# Patient Record
Sex: FEMALE | Race: WHITE | NOT HISPANIC OR LATINO | Employment: UNEMPLOYED | URBAN - METROPOLITAN AREA
[De-identification: names, ages, dates, MRNs, and addresses within clinical notes are randomized per-mention and may not be internally consistent; named-entity substitution may affect disease eponyms.]

---

## 2017-03-27 ENCOUNTER — OFFICE VISIT (OUTPATIENT)
Dept: URGENT CARE | Facility: URGENT CARE | Age: 37
End: 2017-03-27
Payer: COMMERCIAL

## 2017-03-27 VITALS
BODY MASS INDEX: 53.03 KG/M2 | TEMPERATURE: 98.5 F | WEIGHT: 293 LBS | SYSTOLIC BLOOD PRESSURE: 112 MMHG | DIASTOLIC BLOOD PRESSURE: 72 MMHG | HEART RATE: 105 BPM

## 2017-03-27 DIAGNOSIS — B34.9 VIRAL SYNDROME: Primary | ICD-10-CM

## 2017-03-27 DIAGNOSIS — R07.0 THROAT PAIN: ICD-10-CM

## 2017-03-27 DIAGNOSIS — R52 BODY ACHES: ICD-10-CM

## 2017-03-27 LAB
DEPRECATED S PYO AG THROAT QL EIA: NORMAL
FLUAV+FLUBV AG SPEC QL: NEGATIVE
FLUAV+FLUBV AG SPEC QL: NORMAL
MICRO REPORT STATUS: NORMAL
SPECIMEN SOURCE: NORMAL
SPECIMEN SOURCE: NORMAL

## 2017-03-27 PROCEDURE — 99213 OFFICE O/P EST LOW 20 MIN: CPT | Performed by: NURSE PRACTITIONER

## 2017-03-27 PROCEDURE — 87081 CULTURE SCREEN ONLY: CPT | Performed by: NURSE PRACTITIONER

## 2017-03-27 PROCEDURE — 87804 INFLUENZA ASSAY W/OPTIC: CPT | Performed by: NURSE PRACTITIONER

## 2017-03-27 PROCEDURE — 87880 STREP A ASSAY W/OPTIC: CPT | Performed by: NURSE PRACTITIONER

## 2017-03-27 NOTE — MR AVS SNAPSHOT
"              After Visit Summary   3/27/2017    Mary Dixon    MRN: [id number]           Patient Information     Date Of Birth          1980        Visit Information        Provider Department      3/27/2017 6:45 PM Claribel Mendoza APRN [facility]        Today's Diagnoses     Viral syndrome    -  1    Body aches        Throat pain          Care Instructions    Increase rest and fluids. Tylenol and/or Ibuprofen for comfort. Cool mist vaporizer. If your symptoms worsen or do not resolve follow up with your primary care provider in 1 week and sooner if needed.      Strep culture is pending will result in 24-48 hours.  If it is positive and change in treatment plan will contact you.        Indications for emergent return to emergency department discussed with patient, who verbalized good understanding and agreement.  Patient understands the limitations of today's evaluation.           Viral Syndrome (Adult)  A viral illness may cause a number of symptoms. The symptoms depend on the part of the body that the virus affects. If it settles in the nose, throat, and lungs, it may cause cough, sore throat, congestion, and sometimes headache. If it settles in the stomach and intestinal tract, it may cause vomiting and diarrhea. Sometimes it causes vague symptoms like \"aching all over,\" feeling tired, loss of appetite, or fever.  A viral illness usually lasts 1 to 2 weeks, but sometimes it lasts longer. In some cases, a more serious infection can look like a viral syndrome in the first few days of the illness. You may need another exam and additional tests to know the difference. Watch for the warning signs listed below.  Home care  Follow these guidelines for taking care of yourself at home:    If symptoms are severe, rest at home for the first 2 to 3 days.    Stay away from cigarette smoke - both your smoke and the smoke from others.    You may use over-the-counter " medication acetaminophen or ibuprofen for fever, muscle aching, and headache, unless another medicine was prescribed for this. If you have chronic liver or kidney disease or ever had a stomach ulcer or GI bleeding, talk with your doctor before using these medicines . No one who is younger than 18 and ill with a fever should take aspirin. It may cause severe disease or death liver damage .    Your appetite may be poor, so a light diet is fine. Avoid dehydration by drinking 8 to 12 8-ounce glasses of fluids each day. This may include water; orange juice; lemonade; apple, grape, and cranberry juice; clear fruit drinks; electrolyte replacement and sports drinks; and decaffeinated teas and coffee. If you have been diagnosed with a kidney disease, ask your doctor how much and what types of fluids you should drink to prevent dehydration. If you have kidney disease, drinking too much fluid can cause it build up in the your body and be dangerous to your health.    Over-the-counter remedies won't shorten the length of the illness but may be helpful for cough, sore throat; and nasal and sinus congestion. Don't use decongestants if you have high blood pressure.  Follow-up care  Follow up with your health care provider if you do not improve over the next week.  When to seek medical advice  Call your healthcare provider right away if any of these occur:    Cough with lots of colored sputum (mucus) or blood in your sputum    Chest pain, shortness of breath, wheezing, or difficulty breathing    Severe headache; face, neck, or ear pain    Severe, constant pain in the lower right side of your belly (abdominal)    Continued vomiting (can t keep liquids down)    Frequent diarrhea (more than 5 times a day); blood (red or black color) or mucus in diarrhea    Feeling weak, dizzy, or like you are going to faint    Extreme thirst    Fever of 100.4 F (38 C) or higher, or as directed by your healthcare provider  Call 911  Get emergency  medical care if any of the following occur:    Convulsion    Feeling weak, dizzy, or like you are going to faint    Chest pain, shortness of breath, wheezing, or difficulty breathing    9296-5902 [employer]. [address] [place] [address], [place] [address]. All rights reserved. This information is not intended as a substitute for professional medical care. Always follow your healthcare professional's instructions.        Self-Care for Sore Throats  Sore throats occur for many reasons, such as colds, allergies, and infections caused by viruses or bacteria. In any case, your throat becomes red and sore. Your goal for self-care is to reduce your discomfort while giving your throat a chance to heal.    Moisten and Soothe Your Throat    Try a sip of water first thing after waking up.    Keep your throat moist by drinking 6 or more glasses of clear liquids every day.    Run a cool-air humidifier in your room overnight.    Avoid cigarette smoke.     Suck on throat lozenges, cough drops, hard candy, ice chips, or frozen fruit-juice bars. Use the sugar-free versions if your diet or medical condition require them.  Gargle to Ease Irritation  Gargling every hour or 2 can ease irritation. Try gargling with 1 of these solutions:    1/4 teaspoon of salt in 1/2 cup of warm water    An over-the-counter anesthetic gargle  Use Medication for More Relief  Over-the-counter medication can reduce sore throat symptoms. Ask your pharmacist if you have questions about which medication to use:    Ease pain with anesthetic sprays. Aspirin or an aspirin substitute also helps. Remember, never give aspirin to anyone 18 or younger, or if you are already taking blood thinners.     For sore throats caused by allergies, try antihistamines to block the allergic reaction.    Remember: unless a sore throat is caused by a bacterial infection, antibiotics won t help you.  Prevent Future Sore Throats    Stop smoking or reduce contact with  secondhand smoke. Smoke irritates the tender throat lining.    Limit contact with pets and with allergy-causing substances, such as pollen and mold.    When you re around someone with a sore throat or cold, wash your hands frequently to keep viruses or bacteria from spreading.    Don t strain your vocal cords.  Call Your Health Care Provider  Contact your doctor if you have:    A temperature over 101 F (38.3 C)    White spots on the throat    Great difficulty swallowing    Trouble breathing    A skin rash    Recent exposure to someone else with strep bacteria    Severe hoarseness and swollen glands in the neck or jaw     [id number] [employer]. [address] [place] [address]. All rights reserved. This information is not intended as a substitute for professional medical care. Always follow your healthcare professional's instructions.              Follow-ups after your visit        Follow-up notes from your care team     See patient instructions section of the AVS Return in about 1 week (around 4/3/2017), or if symptoms worsen or fail to improve, for Follow up with your primary care provider.      Who to contact     If you have questions or need follow up information about today's clinic visit or your schedule please contact [facility] directly at [phone].  Normal or non-critical lab and imaging results will be communicated to you by MyChart, letter or phone within 4 business days after the clinic has received the results. If you do not hear from us within 7 days, please contact the clinic through MyChart or phone. If you have a critical or abnormal lab result, we will notify you by phone as soon as possible.  Submit refill requests through [employer] or call your pharmacy and they will forward the refill request to us. Please allow 3 business days for your refill to be completed.          Additional Information About Your Visit        MyChart Information      [employer] gives you secure access to your electronic health record. If you see a primary care provider, you can also send messages to your care team and make appointments. If you have questions, please call your primary care clinic.  If you do not have a primary care provider, please call [phone] and they will assist you.        Care EveryWhere ID     This is your Care EveryWhere ID. This could be used by other organizations to access your [facility] medical records  [id number]        Your Vitals Were     Pulse Temperature BMI (Body Mass Index)             105 98.5  F (36.9  C) (Tympanic) 53.03 kg/m2          Blood Pressure from Last 3 Encounters:   03/27/17 112/72   09/30/16 128/82   06/20/16 139/89    Weight from Last 3 Encounters:   03/27/17 (!) 357 lb (161.9 kg)   09/30/16 (!) 361 lb 6.4 oz (163.9 kg)   06/20/16 (!) 354 lb (160.6 kg)              We Performed the Following     Beta strep group A culture     Influenza A/B antigen     Strep, Rapid Screen        Primary Care Provider Office Phone # Fax #    Aldo Del Valle [phone]       [facility] [address] [facility] [address]        Thank you!     Thank you for choosing [facility]  for your care. Our goal is always to provide you with excellent care. Hearing back from our patients is one way we can continue to improve our services. Please take a few minutes to complete the written survey that you may receive in the mail after your visit with us. Thank you!             Your Updated Medication List - Protect others around you: Learn how to safely use, store and throw away your medicines at www.disposemymeds.org.          This list is accurate as of: 3/27/17  8:34 PM.  Always use your most recent med list.                   Brand Name Dispense Instructions for use    lisinopril 10 MG tablet    PRINIVIL/ZESTRIL    30 tablet    Take 1 tablet (10 mg) by mouth daily       metFORMIN 500 MG  24 hr tablet    GLUCOPHAGE-XR     Take 500 mg by mouth 2 times daily (with meals) Reported on 3/27/2017       omeprazole 20 MG CR capsule    priLOSEC    90 capsule    Take 1 capsule (20 mg) by mouth daily       order for DME      Reported on 3/27/2017

## 2017-03-27 NOTE — LETTER
[facility]  [address] [address]  [phone]      3/27/2017    Re: Mary Dixon      TO WHOM IT MAY CONCERN:    Mary Dixon  was seen on 3-27-17.  Please excuse her for the next 1-2 days due to illness.    CordLYLE Richter CNP  [facility]

## 2017-03-28 NOTE — NURSING NOTE
"Chief Complaint   Patient presents with     URI       Initial /72 (BP Location: Right arm, Cuff Size: Adult Large)  Pulse 105  Temp 98.5  F (36.9  C) (Tympanic)  Wt (!) 357 lb (161.9 kg)  BMI 53.03 kg/m2 Estimated body mass index is 53.03 kg/(m^2) as calculated from the following:    Height as of 6/20/16: 5' 8.8\" (1.748 m).    Weight as of this encounter: 357 lb (161.9 kg).  Medication Reconciliation: complete    Health Maintenance that is potentially due pending provider review:  NONE    N/a  Rajani Sainz M.A.      "

## 2017-03-28 NOTE — PROGRESS NOTES
SUBJECTIVE:                                                    Mary Dixon is a 37 year old female who presents to clinic today for the following health issues:    ENT Symptoms             Symptoms: cc Present Absent Comment   Fever/Chills  x  Both    Fatigue  x     Muscle Aches  x     Eye Irritation       Sneezing       Nasal Pranay/Drg  x     Sinus Pressure/Pain       Loss of smell       Dental pain       Sore Throat  x     Swollen Glands       Ear Pain/Fullness  x  Hurt   Cough  x     Wheeze       Chest Pain       Shortness of breath  x     Rash       Other         Symptom duration:  possibly Saturday, otherwise today    Symptom severity:  worse   Treatments tried:  none    Contacts:  none              Problem list and histories reviewed & adjusted, as indicated.  Additional history: as documented    Patient Active Problem List   Diagnosis     Benign intracranial hypertension     Urgency of urination     Labyrinthitis     Status post oophorectomy     Mood and affect disturbance (H)     DUB (dysfunctional uterine bleeding)     Fatigue     Knee injury     Tobacco abuse     Obesity     Constipation     Breast lump     Attention deficit disorder of adult     Myofascial pain     CARDIOVASCULAR SCREENING; LDL GOAL LESS THAN 160     Personal history of ovarian cancer     HTN (hypertension)     Impaired fasting glucose     PMB (postmenopausal bleeding)     Prediabetes     Pseudotumor cerebri     Past Surgical History:   Procedure Laterality Date     APPENDECTOMY  3/6/08     GYN SURGERY       SURGICAL HISTORY OF -   3/6/08    EUA, 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        Social History   Substance Use Topics     Smoking status: Current Every Day Smoker     Packs/day: 0.50     Years: 2.00     Types: Cigarettes     Smokeless tobacco: Current User     Alcohol use No     Family History   Problem Relation Age of Onset     Hypertension Father      DIABETES Maternal Grandmother      Alzheimer  Disease Maternal Grandmother      Breast Cancer Maternal Grandmother      HEART DISEASE Maternal Grandfather      Hypertension Maternal Grandfather      Coronary Artery Disease Maternal Grandfather 55     bypass         Current Outpatient Prescriptions   Medication Sig Dispense Refill     omeprazole (PRILOSEC) 20 MG capsule Take 1 capsule (20 mg) by mouth daily (Patient not taking: Reported on 3/27/2017) 90 capsule 3     metFORMIN (GLUCOPHAGE-XR) 500 MG 24 hr tablet Take 500 mg by mouth 2 times daily (with meals) Reported on 3/27/2017       lisinopril (PRINIVIL,ZESTRIL) 10 MG tablet Take 1 tablet (10 mg) by mouth daily (Patient not taking: Reported on 3/27/2017) 30 tablet 1     ORDER FOR DME Reported on 3/27/2017       No Known Allergies  Labs reviewed in EPIC    Reviewed and updated as needed this visit by clinical staff  Tobacco  Allergies  Meds  Problems  Med Hx  Surg Hx  Fam Hx  Soc Hx        Reviewed and updated as needed this visit by Provider  Allergies  Meds  Problems         ROS:  Constitutional, HEENT, cardiovascular, pulmonary, GI, , musculoskeletal, neuro, skin, endocrine and psych systems are negative, except as otherwise noted.    OBJECTIVE:                                                    /72 (BP Location: Right arm, Cuff Size: Adult Large)  Pulse 105  Temp 98.5  F (36.9  C) (Tympanic)  Wt (!) 357 lb (161.9 kg)  BMI 53.03 kg/m2  Body mass index is 53.03 kg/(m^2).  GENERAL: healthy, alert and no distress, nontoxic in appearance  EYES: Eyes grossly normal to inspection,  conjunctivae and sclerae normal  HENT: ear canals and TM's normal, nose and mouth without ulcers or lesions  NECK: no adenopathy,supple with full ROM  RESP: lungs clear to auscultation - no rales, rhonchi or wheezes  CV: regular rate and rhythm, normal S1 S2, no S3 or S4, no murmur, click or rub  ABDOMEN: soft, nontender, large abdomen makes it difficult to assess for true masses or megaly  MS: no gross  musculoskeletal defects noted, no edema  No rash    Diagnostic Test Results:  Results for orders placed or performed in visit on 03/27/17 (from the past 24 hour(s))   Influenza A/B antigen   Result Value Ref Range    Influenza A/B Agn Specimen Nasopharyngeal     Influenza A Negative NEG    Influenza B  NEG     Negative   Test results must be correlated with clinical data. If necessary, results   should be confirmed by a molecular assay or viral culture.     Strep, Rapid Screen   Result Value Ref Range    Specimen Description Throat     Rapid Strep A Screen       NEGATIVE: No Group A streptococcal antigen detected by immunoassay, await   culture report.      Micro Report Status FINAL 03/27/2017         ASSESSMENT/PLAN:                                                      Problem List Items Addressed This Visit     None      Visit Diagnoses     Viral syndrome    -  Primary    Body aches        Relevant Orders    Influenza A/B antigen (Completed)    Strep, Rapid Screen (Completed)    Throat pain        Relevant Orders    Beta strep group A culture (Completed)               Patient Instructions     Increase rest and fluids. Tylenol and/or Ibuprofen for comfort. Cool mist vaporizer. If your symptoms worsen or do not resolve follow up with your primary care provider in 1 week and sooner if needed.      Strep culture is pending will result in 24-48 hours.  If it is positive and change in treatment plan will contact you.        Indications for emergent return to emergency department discussed with patient, who verbalized good understanding and agreement.  Patient understands the limitations of today's evaluation.           Viral Syndrome (Adult)  A viral illness may cause a number of symptoms. The symptoms depend on the part of the body that the virus affects. If it settles in the nose, throat, and lungs, it may cause cough, sore throat, congestion, and sometimes headache. If it settles in the stomach and intestinal tract,  "it may cause vomiting and diarrhea. Sometimes it causes vague symptoms like \"aching all over,\" feeling tired, loss of appetite, or fever.  A viral illness usually lasts 1 to 2 weeks, but sometimes it lasts longer. In some cases, a more serious infection can look like a viral syndrome in the first few days of the illness. You may need another exam and additional tests to know the difference. Watch for the warning signs listed below.  Home care  Follow these guidelines for taking care of yourself at home:    If symptoms are severe, rest at home for the first 2 to 3 days.    Stay away from cigarette smoke - both your smoke and the smoke from others.    You may use over-the-counter medication acetaminophen or ibuprofen for fever, muscle aching, and headache, unless another medicine was prescribed for this. If you have chronic liver or kidney disease or ever had a stomach ulcer or GI bleeding, talk with your doctor before using these medicines . No one who is younger than 18 and ill with a fever should take aspirin. It may cause severe disease or death liver damage .    Your appetite may be poor, so a light diet is fine. Avoid dehydration by drinking 8 to 12 8-ounce glasses of fluids each day. This may include water; orange juice; lemonade; apple, grape, and cranberry juice; clear fruit drinks; electrolyte replacement and sports drinks; and decaffeinated teas and coffee. If you have been diagnosed with a kidney disease, ask your doctor how much and what types of fluids you should drink to prevent dehydration. If you have kidney disease, drinking too much fluid can cause it build up in the your body and be dangerous to your health.    Over-the-counter remedies won't shorten the length of the illness but may be helpful for cough, sore throat; and nasal and sinus congestion. Don't use decongestants if you have high blood pressure.  Follow-up care  Follow up with your health care provider if you do not improve over the next " week.  When to seek medical advice  Call your healthcare provider right away if any of these occur:    Cough with lots of colored sputum (mucus) or blood in your sputum    Chest pain, shortness of breath, wheezing, or difficulty breathing    Severe headache; face, neck, or ear pain    Severe, constant pain in the lower right side of your belly (abdominal)    Continued vomiting (can t keep liquids down)    Frequent diarrhea (more than 5 times a day); blood (red or black color) or mucus in diarrhea    Feeling weak, dizzy, or like you are going to faint    Extreme thirst    Fever of 100.4 F (38 C) or higher, or as directed by your healthcare provider  Call 911  Get emergency medical care if any of the following occur:    Convulsion    Feeling weak, dizzy, or like you are going to faint    Chest pain, shortness of breath, wheezing, or difficulty breathing    [id number] [employer]. [address] [place] [address]. All rights reserved. This information is not intended as a substitute for professional medical care. Always follow your healthcare professional's instructions.        Self-Care for Sore Throats  Sore throats occur for many reasons, such as colds, allergies, and infections caused by viruses or bacteria. In any case, your throat becomes red and sore. Your goal for self-care is to reduce your discomfort while giving your throat a chance to heal.    Moisten and Soothe Your Throat    Try a sip of water first thing after waking up.    Keep your throat moist by drinking 6 or more glasses of clear liquids every day.    Run a cool-air humidifier in your room overnight.    Avoid cigarette smoke.     Suck on throat lozenges, cough drops, hard candy, ice chips, or frozen fruit-juice bars. Use the sugar-free versions if your diet or medical condition require them.  Gargle to Ease Irritation  Gargling every hour or 2 can ease irritation. Try gargling with 1 of these solutions:    1/4 teaspoon of salt  in 1/2 cup of warm water    An over-the-counter anesthetic gargle  Use Medication for More Relief  Over-the-counter medication can reduce sore throat symptoms. Ask your pharmacist if you have questions about which medication to use:    Ease pain with anesthetic sprays. Aspirin or an aspirin substitute also helps. Remember, never give aspirin to anyone 18 or younger, or if you are already taking blood thinners.     For sore throats caused by allergies, try antihistamines to block the allergic reaction.    Remember: unless a sore throat is caused by a bacterial infection, antibiotics won t help you.  Prevent Future Sore Throats    Stop smoking or reduce contact with secondhand smoke. Smoke irritates the tender throat lining.    Limit contact with pets and with allergy-causing substances, such as pollen and mold.    When you re around someone with a sore throat or cold, wash your hands frequently to keep viruses or bacteria from spreading.    Don t strain your vocal cords.  Call Your Health Care Provider  Contact your doctor if you have:    A temperature over 101 F (38.3 C)    White spots on the throat    Great difficulty swallowing    Trouble breathing    A skin rash    Recent exposure to someone else with strep bacteria    Severe hoarseness and swollen glands in the neck or jaw     [id number] [employer]. [address] [place] [address], [place] [address]. All rights reserved. This information is not intended as a substitute for professional medical care. Always follow your healthcare professional's instructions.            LYLE Astudillo [facility]

## 2017-03-28 NOTE — PATIENT INSTRUCTIONS
"Increase rest and fluids. Tylenol and/or Ibuprofen for comfort. Cool mist vaporizer. If your symptoms worsen or do not resolve follow up with your primary care provider in 1 week and sooner if needed.      Strep culture is pending will result in 24-48 hours.  If it is positive and change in treatment plan will contact you.        Indications for emergent return to emergency department discussed with patient, who verbalized good understanding and agreement.  Patient understands the limitations of today's evaluation.           Viral Syndrome (Adult)  A viral illness may cause a number of symptoms. The symptoms depend on the part of the body that the virus affects. If it settles in the nose, throat, and lungs, it may cause cough, sore throat, congestion, and sometimes headache. If it settles in the stomach and intestinal tract, it may cause vomiting and diarrhea. Sometimes it causes vague symptoms like \"aching all over,\" feeling tired, loss of appetite, or fever.  A viral illness usually lasts 1 to 2 weeks, but sometimes it lasts longer. In some cases, a more serious infection can look like a viral syndrome in the first few days of the illness. You may need another exam and additional tests to know the difference. Watch for the warning signs listed below.  Home care  Follow these guidelines for taking care of yourself at home:    If symptoms are severe, rest at home for the first 2 to 3 days.    Stay away from cigarette smoke - both your smoke and the smoke from others.    You may use over-the-counter medication acetaminophen or ibuprofen for fever, muscle aching, and headache, unless another medicine was prescribed for this. If you have chronic liver or kidney disease or ever had a stomach ulcer or GI bleeding, talk with your doctor before using these medicines . No one who is younger than 18 and ill with a fever should take aspirin. It may cause severe disease or death liver damage .    Your appetite may be poor, so " a light diet is fine. Avoid dehydration by drinking 8 to 12 8-ounce glasses of fluids each day. This may include water; orange juice; lemonade; apple, grape, and cranberry juice; clear fruit drinks; electrolyte replacement and sports drinks; and decaffeinated teas and coffee. If you have been diagnosed with a kidney disease, ask your doctor how much and what types of fluids you should drink to prevent dehydration. If you have kidney disease, drinking too much fluid can cause it build up in the your body and be dangerous to your health.    Over-the-counter remedies won't shorten the length of the illness but may be helpful for cough, sore throat; and nasal and sinus congestion. Don't use decongestants if you have high blood pressure.  Follow-up care  Follow up with your health care provider if you do not improve over the next week.  When to seek medical advice  Call your healthcare provider right away if any of these occur:    Cough with lots of colored sputum (mucus) or blood in your sputum    Chest pain, shortness of breath, wheezing, or difficulty breathing    Severe headache; face, neck, or ear pain    Severe, constant pain in the lower right side of your belly (abdominal)    Continued vomiting (can t keep liquids down)    Frequent diarrhea (more than 5 times a day); blood (red or black color) or mucus in diarrhea    Feeling weak, dizzy, or like you are going to faint    Extreme thirst    Fever of 100.4 F (38 C) or higher, or as directed by your healthcare provider  Call 911  Get emergency medical care if any of the following occur:    Convulsion    Feeling weak, dizzy, or like you are going to faint    Chest pain, shortness of breath, wheezing, or difficulty breathing    2914-4190 [employer]. [address] [place] [address]. All rights reserved. This information is not intended as a substitute for professional medical care. Always follow your healthcare professional's  instructions.        Self-Care for Sore Throats  Sore throats occur for many reasons, such as colds, allergies, and infections caused by viruses or bacteria. In any case, your throat becomes red and sore. Your goal for self-care is to reduce your discomfort while giving your throat a chance to heal.    Moisten and Soothe Your Throat    Try a sip of water first thing after waking up.    Keep your throat moist by drinking 6 or more glasses of clear liquids every day.    Run a cool-air humidifier in your room overnight.    Avoid cigarette smoke.     Suck on throat lozenges, cough drops, hard candy, ice chips, or frozen fruit-juice bars. Use the sugar-free versions if your diet or medical condition require them.  Gargle to Ease Irritation  Gargling every hour or 2 can ease irritation. Try gargling with 1 of these solutions:    1/4 teaspoon of salt in 1/2 cup of warm water    An over-the-counter anesthetic gargle  Use Medication for More Relief  Over-the-counter medication can reduce sore throat symptoms. Ask your pharmacist if you have questions about which medication to use:    Ease pain with anesthetic sprays. Aspirin or an aspirin substitute also helps. Remember, never give aspirin to anyone 18 or younger, or if you are already taking blood thinners.     For sore throats caused by allergies, try antihistamines to block the allergic reaction.    Remember: unless a sore throat is caused by a bacterial infection, antibiotics won t help you.  Prevent Future Sore Throats    Stop smoking or reduce contact with secondhand smoke. Smoke irritates the tender throat lining.    Limit contact with pets and with allergy-causing substances, such as pollen and mold.    When you re around someone with a sore throat or cold, wash your hands frequently to keep viruses or bacteria from spreading.    Don t strain your vocal cords.  Call Your Health Care Provider  Contact your doctor if you have:    A temperature over 101 F  (38.3 C)    White spots on the throat    Great difficulty swallowing    Trouble breathing    A skin rash    Recent exposure to someone else with strep bacteria    Severe hoarseness and swollen glands in the neck or jaw     5558-2078 [employer]. [address] [place] [address], [place] [address]. All rights reserved. This information is not intended as a substitute for professional medical care. Always follow your healthcare professional's instructions.

## 2017-03-29 LAB
BACTERIA SPEC CULT: NORMAL
MICRO REPORT STATUS: NORMAL
SPECIMEN SOURCE: NORMAL

## 2017-03-31 ENCOUNTER — OFFICE VISIT (OUTPATIENT)
Dept: FAMILY MEDICINE | Facility: CLINIC | Age: 37
End: 2017-03-31
Payer: COMMERCIAL

## 2017-03-31 VITALS
TEMPERATURE: 97 F | BODY MASS INDEX: 52.43 KG/M2 | SYSTOLIC BLOOD PRESSURE: 142 MMHG | HEART RATE: 89 BPM | DIASTOLIC BLOOD PRESSURE: 94 MMHG | WEIGHT: 293 LBS

## 2017-03-31 DIAGNOSIS — J20.9 ACUTE BRONCHITIS WITH COEXISTING CONDITION REQUIRING PROPHYLACTIC TREATMENT: ICD-10-CM

## 2017-03-31 DIAGNOSIS — I10 ESSENTIAL HYPERTENSION: ICD-10-CM

## 2017-03-31 DIAGNOSIS — J01.90 ACUTE SINUSITIS WITH COEXISTING CONDITION REQUIRING PROPHYLACTIC TREATMENT: Primary | ICD-10-CM

## 2017-03-31 PROCEDURE — 99214 OFFICE O/P EST MOD 30 MIN: CPT | Performed by: NURSE PRACTITIONER

## 2017-03-31 RX ORDER — BENZONATATE 100 MG/1
100 CAPSULE ORAL 3 TIMES DAILY PRN
Qty: 42 CAPSULE | Refills: 0 | Status: SHIPPED | OUTPATIENT
Start: 2017-03-31 | End: 2017-05-20

## 2017-03-31 RX ORDER — PREDNISONE 20 MG/1
TABLET ORAL
Qty: 10 TABLET | Refills: 0 | Status: SHIPPED | OUTPATIENT
Start: 2017-03-31 | End: 2017-05-20

## 2017-03-31 RX ORDER — LISINOPRIL 10 MG/1
10 TABLET ORAL DAILY
Qty: 30 TABLET | Refills: 0 | Status: ON HOLD | OUTPATIENT
Start: 2017-03-31 | End: 2017-12-17

## 2017-03-31 RX ORDER — FLUTICASONE PROPIONATE 50 MCG
1-2 SPRAY, SUSPENSION (ML) NASAL DAILY
Qty: 16 G | Refills: 0 | Status: SHIPPED | OUTPATIENT
Start: 2017-03-31 | End: 2017-12-16

## 2017-03-31 RX ORDER — ALBUTEROL SULFATE 90 UG/1
2 AEROSOL, METERED RESPIRATORY (INHALATION) EVERY 6 HOURS PRN
Qty: 1 INHALER | Refills: 0 | Status: SHIPPED | OUTPATIENT
Start: 2017-03-31 | End: 2017-12-16

## 2017-03-31 NOTE — LETTER
[facility]  [address] [place] [id number]  Phone: [phone]  Fax: [phone]    March 31, 2017        Mary Dixon  [address] [facility] [id number]          To whom it may concern:    RE: Mary Dixon    Patient was seen and treated today at our clinic and missed work.  Can return to work  once fever free  for 24 hours.      Please contact me for questions or concerns.      Sincerely,        LYLE Flores CNP

## 2017-03-31 NOTE — NURSING NOTE
"Chief Complaint   Patient presents with     URI     BP (!) 130/92  Pulse 89  Temp 97  F (36.1  C) (Tympanic)  Wt (!) 353 lb (160.1 kg)  BMI 52.43 kg/m2 Estimated body mass index is 52.43 kg/(m^2) as calculated from the following:    Height as of 6/20/16: 5' 8.8\" (1.748 m).    Weight as of this encounter: 353 lb (160.1 kg).  bp completed using cuff size: large      Health Maintenance that is potentially due pending provider review:  Knows what she is due for health maintenance         "

## 2017-03-31 NOTE — PATIENT INSTRUCTIONS
Medications as directed   Symptom Relief: Afdrin do not use greater then 3 days  Intranasal corticosteroid   Flonase has  been prescribed.     Tylenol or Ibuprofen if able to take for fevers and discomfort.  Do not exceed 4 grams of Tylenol in a 24 hour period.  Take Ibuprofen with food.  Hydrate with fluids, rest, cool humidifier.  May use acetaminophen, ibuprofen prn.    For your Cough   [employer] Honey Dose: Given   hour Prior to Bedtime  For children age under 1 year -Do not use due to botulism risk     2-5 years -  tsp (2.5 mL)    6-11 years -1 tsp (5 mL)    12-18 years -2 tsp (10 mL)     Guaifenesin     Adult dose -Not to exceed 2.4 g (2400mg) per day    Child age 6-12 years -100 mg every 4 hr, not to exceed 1.2 g (1200mg) per day     Pediatric, 2-6 years -50 mg every 4 hr as needed, not to exceed 600 mg per day    Cough medications is not recommended in children under 2 years.  With use of cough medications have combination medications be aware of products in the cough medication you are using to avoid overdose    Follow up with PCP in 2 weeks  .    Go to Emergency Room if sx worsen or change, Shortness of breath, chest pain, persistent fevers, or painful breathing occur.     Patient voiced understanding of instructions given.      Follow-up with your primary care provider next week and as needed.    Indications for emergent return to emergency department discussed with patient, who verbalized good understanding and agreement.  Patient understands the limitations of today's evaluation.           Out of Control High Blood Pressure (Established)    Your blood pressure was unusually high today. This can occur if you ve missed doses of your blood pressure medicine. Or it can happen if you are taking other medicines. These include some asthma inhalers, decongestants, diet pills, and street drugs like cocaine and amphetamine.  Other causes include:    Weight gain    More salt in your  diet    Smoking    Caffeine  Your blood pressure can also rise if you are emotionally upset or in intense pain. It may go back to normal after a period of rest.  A blood pressure reading is made up of 2 numbers. There is a top number over a bottom number. The top number is the systolic pressure. The bottom number is the diastolic pressure. A normal blood pressure is less than 120 over less than 80. High blood pressure (hypertension) is when the top number is 140 or higher. Or it is when the bottom number is 90 or higher. To be high blood pressure, the numbers must be higher when tested over a period of time. The blood pressures between normal and hypertension are called prehypertension.  Home care  It s important to take steps to lower your blood pressure. If you are taking blood pressure medicine, the guidelines below may help you need less or no medicines in the future.    Begin a weight-loss program if you are overweight.    Cut back on the amount of salt in your diet:    Avoid high-salt foods like olives, pickles, smoked meats, and salted potato chips.    Don t add salt to your food at the table.    Use only small amounts of salt when cooking.    Begin an exercise program. Talk with your health care provider about what exercise program is best for you. It doesn t have to be difficult. Even brisk walking for 20 minutes 3 times a week is a good form of exercise.    Avoid medicines that have heart stimulants in them. This includes many cold and sinus decongestant pills and sprays, as well as diet pills. Check the warnings about hypertension on the label. Stimulants such as amphetamine or cocaine could be lethal for someone with hypertension. Never take these.    Limit how much caffeine you drink. Or switch to noncaffeinated beverages.    Stop smoking. If you are a long-time smoker, this can be hard. Enroll in a stop-smoking program to make it more likely that you will succeed. Talk with your provider about ways to  quit.    Learn how to handle stress better. This is an important part of any program to lower blood pressure. Learn ways to relax. These include meditation, yoga, and biofeedback.    If medicines were prescribed, take them exactly as directed. Missing doses may cause your blood pressure to get out of control.    Consider buying an automatic blood pressure machine. These are available at many drugstores. Use this to monitor your blood pressure. Report the results to your provider.  Follow-up care  Regular visits to your own health care provider for blood pressure and medicine checks are an important part of your care. Make a follow-up appointment as directed.  When to seek medical advice  Call your health care provider right away if any of these occur:    Chest, arm, shoulder, neck, or upper back pain    Shortness of breath    Severe headache    Throbbing or rushing sound in the ears    Nosebleed    Extreme drowsiness, confusion, or fainting    Dizziness or dizziness with spinning sensation (vertigo)    Weakness in an arm or leg or on one side of the face    Difficulty speaking or seeing     0718-8531 [employer]. [address] [place] [address]. All rights reserved. This information is not intended as a substitute for professional medical care. Always follow your healthcare professional's instructions.        Bronchitis, Antibiotic Treatment (Adult)    Bronchitis is an infection of the air passages (bronchial tubes) in your lungs. It often occurs when you have a cold. This illness is contagious during the first few days and is spread through the air by coughing and sneezing, or by direct contact (touching the sick person and then touching your own eyes, nose, or mouth).  Symptoms of bronchitis include cough with mucus (phlegm) and low-grade fever. Bronchitis usually lasts 7 to 14 days. Mild cases can be treated with simple home remedies. More severe infection is treated with an  antibiotic.  Home care  Follow these guidelines when caring for yourself at home:    If your symptoms are severe, rest at home for the first 2 to 3 days. When you go back to your usual activities, don't let yourself get too tired.    Do not smoke. Also avoid being exposed to secondhand smoke.    You may use over-the-counter medicines to control fever or pain, unless another medicine was prescribed. (Note: If you have chronic liver or kidney disease or have ever had a stomach ulcer or gastrointestinal bleeding, talk with your healthcare provider before using these medicines. Also talk to your provider if you are taking medicine to prevent blood clots.) Aspirin should never be given to anyone younger than 18 years of age who is ill with a viral infection or fever. It may cause severe liver or brain damage.    Your appetite may be poor, so a light diet is fine. Avoid dehydration by drinking 6 to 8 glasses of fluids per day (such as water, soft drinks, sports drinks, juices, tea, or soup). Extra fluids will help loosen secretions in the nose and lungs.    Over-the-counter cough, cold, and sore-throat medicines will not shorten the length of the illness, but they may be helpful to reduce symptoms. (Note: Do not use decongestants if you have high blood pressure.)    Finish all antibiotic medicine. Do this even if you are feeling better after only a few days.  Follow-up care  Follow up with your healthcare provider, or as advised. If you had an X-ray or ECG (electrocardiogram), a specialist will review it. You will be notified of any new findings that may affect your care.  Note: If you are age 65 or older, or if you have a chronic lung disease or condition that affects your immune system, or you smoke, talk to your healthcare provider about having pneumococcal vaccinations and a yearly influenza vaccination (flu shot).  When to seek medical advice  Call your healthcare provider right away if any of these occur:    Fever  of 100.4 F (38 C) or higher    Coughing up increased amounts of colored sputum    Weakness, drowsiness, headache, facial pain, ear pain, or a stiff neck   Call 911, or get immediate medical care  Contact emergency services right away if any of these occur.    Coughing up blood    Worsening weakness, drowsiness, headache, or stiff neck    Trouble breathing, wheezing, or pain with breathing    6498-6436 [employer]. [address] [place] [address], [place] [address]. All rights reserved. This information is not intended as a substitute for professional medical care. Always follow your healthcare professional's instructions.        Sinusitis (Antibiotic Treatment)    The sinuses are air-filled spaces within the bones of the face. They connect to the inside of the nose. Sinusitis is an inflammation of the tissue lining the sinus cavity. Sinus inflammation can occur during a cold. It can also be due to allergies to pollens and other particles in the air. Sinusitis can cause symptoms of sinus congestion and fullness. A sinus infection causes fever, headache and facial pain. There is often green or yellow drainage from the nose or into the back of the throat (post-nasal drip). You have been given antibiotics to treat this condition.  Home care:    Take the full course of antibiotics as instructed. Do not stop taking them, even if you feel better.    Drink plenty of water, hot tea, and other liquids. This may help thin mucus. It also may promote sinus drainage.    Heat may help soothe painful areas of the face. Use a towel soaked in hot water. Or,  the shower and direct the hot spray onto your face. Using a vaporizer along with a menthol rub at night may also help.     An expectorant containing guaifenesin may help thin the mucus and promote drainage from the sinuses.    Over-the-counter decongestants may be used unless a similar medicine was prescribed. Nasal sprays work the fastest. Use one that contains  phenylephrine or oxymetazoline. First blow the nose gently. Then use the spray. Do not use these medicines more often than directed on the label or symptoms may get worse. You may also use tablets containing pseudoephedrine. Avoid products that combine ingredients, because side effects may be increased. Read labels. You can also ask the pharmacist for help. (NOTE: Persons with high blood pressure should not use decongestants. They can raise blood pressure.)    Over-the-counter antihistamines may help if allergies contributed to your sinusitis.      Do not use nasal rinses or irrigation during an acute sinus infection, unless told to by your health care provider. Rinsing may spread the infection to other sinuses.    Use acetaminophen or ibuprofen to control pain, unless another pain medicine was prescribed. (If you have chronic liver or kidney disease or ever had a stomach ulcer, talk with your doctor before using these medicines. Aspirin should never be used in anyone under 18 years of age who is ill with a fever. It may cause severe liver damage.)    Don't smoke. This can worsen symptoms.  Follow-up care  Follow up with your healthcare provider or our staff if you are not improving within the next week.  When to seek medical advice  Call your healthcare provider if any of these occur:    Facial pain or headache becoming more severe    Stiff neck    Unusual drowsiness or confusion    Swelling of the forehead or eyelids    Vision problems, including blurred or double vision    Fever of 100.4 F (38 C) or higher, or as directed by your healthcare provider    Seizure    Breathing problems    Symptoms not resolving within 10 days    3849-1749 [employer]. [address] [place] [address], [place] [address]. All rights reserved. This information is not intended as a substitute for professional medical care. Always follow your healthcare professional's instructions.

## 2017-03-31 NOTE — PROGRESS NOTES
SUBJECTIVE:                                                    Mary Dixon is a 37 year old female who presents to clinic today for the following health issues:      Acute Illness   Acute illness concerns: URI  Onset: Monday     Fever: no    Chills/Sweats: YES    Headache (location?): occasionally     Sinus Pressure:YES    Conjunctivitis:  NO    Ear Pain: YES: bilateral    Rhinorrhea: YES    Congestion: YES    Sore Throat: YES     Cough: YES    Wheeze: YES    Decreased Appetite: YES    Nausea: YES    Vomiting: no    Diarrhea:  YES- soft, she doesn't trust farts     Dysuria/Freq.: no    Fatigue/Achiness: YES    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      Therapies Tried and outcome: tylenol       Past Medical History:   Diagnosis Date     Benign intracranial hypertension      Hypertension      Mononucleosis      Morbid obesity with BMI of 50.0-59.9, adult (H)      Ovarian cancer (H)     borderline ovarian cancer     Pneumonia      Post-menopausal bleeding      Pseudotumor cerebri        Social History   Substance Use Topics     Smoking status: Current Every Day Smoker     Packs/day: 0.50     Years: 2.00     Types: Cigarettes     Smokeless tobacco: Current User     Alcohol use No         ROS  CONSTITUTIONAL:NEGATIVE for fever, chills, change in weight  INTEGUMENTARY/SKIN: NEGATIVE for worrisome rashes, moles or lesions  EYES: NEGATIVE for vision changes or irritation  ENT/MOUTH: NEGATIVE for ear, mouth and throat problems  RESP:See above   CV: NEGATIVE for chest pain, palpitations or peripheral edema  MUSCULOSKELETAL: NEGATIVE for significant arthralgias or myalgia  NEURO: NEGATIVE for weakness, dizziness or paresthesias    OBJECTIVE:  BP (!) 130/92  Pulse 89  Temp 97  F (36.1  C) (Tympanic)  Wt (!) 353 lb (160.1 kg)  BMI 52.43 kg/m2  GENERAL APPEARANCE: healthy, alert and no distress  EYES: EOMI,  PERRL, conjunctiva clear  HENT: ear canals and TM's normal.  mouth without ulcers, erythema or lesions  Maxillary sinus tenderness, bilateral turbinates inflamed and edematous    NECK: supple, nontender, no lymphadenopathy  RESP: lungs clear to auscultation - no rales, rhonchi or wheezes  CV: regular rates and rhythm, normal S1 S2, no murmur noted  ABDOMEN:  soft, nontender, no HSM or masses and bowel sounds normal  NEURO: Normal strength and tone, sensory exam grossly normal,  normal speech and mentation  SKIN: no suspicious lesions or rashes      ASSESSMENT:     ICD-10-CM    1. Acute sinusitis with coexisting condition requiring prophylactic treatment J01.90 fluticasone (FLONASE) 50 MCG/ACT spray     amoxicillin-clavulanate (AUGMENTIN) 875-125 MG per tablet   2. Acute bronchitis with coexisting condition requiring prophylactic treatment J20.9 albuterol (PROAIR HFA/PROVENTIL HFA/VENTOLIN HFA) 108 (90 BASE) MCG/ACT Inhaler     predniSONE (DELTASONE) 20 MG tablet     amoxicillin-clavulanate (AUGMENTIN) 875-125 MG per tablet     benzonatate (TESSALON PERLES) 100 MG capsule   3. Essential hypertension I10 lisinopril (PRINIVIL/ZESTRIL) 10 MG tablet       PLAN:  Patient Instructions   Medications as directed   Symptom Relief: Afdrin do not use greater then 3 days  Intranasal corticosteroid   Flonase has  been prescribed.     Tylenol or Ibuprofen if able to take for fevers and discomfort.  Do not exceed 4 grams of Tylenol in a 24 hour period.  Take Ibuprofen with food.  Hydrate with fluids, rest, cool humidifier.  May use acetaminophen, ibuprofen prn.    For your Cough   The [employer] Honey Dose: Given   hour Prior to Bedtime  For children age under 1 year -Do not use due to botulism risk     2-5 years -  tsp (2.5 mL)    6-11 years -1 tsp (5 mL)    12-18 years -2 tsp (10 mL)     Guaifenesin     Adult dose -Not to exceed 2.4 g (2400mg) per day    Child age 6-12 years -100 mg every 4 hr, not to exceed 1.2 g (1200mg) per day     Pediatric, 2-6 years -50 mg every 4 hr as needed, not to exceed 600 mg per day    Cough medications  is not recommended in children under 2 years.  With use of cough medications have combination medications be aware of products in the cough medication you are using to avoid overdose    Follow up with PCP in 2 weeks  .    Go to Emergency Room if sx worsen or change, Shortness of breath, chest pain, persistent fevers, or painful breathing occur.     Patient voiced understanding of instructions given.      Follow-up with your primary care provider next week and as needed.    Indications for emergent return to emergency department discussed with patient, who verbalized good understanding and agreement.  Patient understands the limitations of today's evaluation.           Out of Control High Blood Pressure (Established)    Your blood pressure was unusually high today. This can occur if you ve missed doses of your blood pressure medicine. Or it can happen if you are taking other medicines. These include some asthma inhalers, decongestants, diet pills, and street drugs like cocaine and amphetamine.  Other causes include:    Weight gain    More salt in your diet    Smoking    Caffeine  Your blood pressure can also rise if you are emotionally upset or in intense pain. It may go back to normal after a period of rest.  A blood pressure reading is made up of 2 numbers. There is a top number over a bottom number. The top number is the systolic pressure. The bottom number is the diastolic pressure. A normal blood pressure is less than 120 over less than 80. High blood pressure (hypertension) is when the top number is 140 or higher. Or it is when the bottom number is 90 or higher. To be high blood pressure, the numbers must be higher when tested over a period of time. The blood pressures between normal and hypertension are called prehypertension.  Home care  It s important to take steps to lower your blood pressure. If you are taking blood pressure medicine, the guidelines below may help you need less or no medicines in the  future.    Begin a weight-loss program if you are overweight.    Cut back on the amount of salt in your diet:    Avoid high-salt foods like olives, pickles, smoked meats, and salted potato chips.    Don t add salt to your food at the table.    Use only small amounts of salt when cooking.    Begin an exercise program. Talk with your health care provider about what exercise program is best for you. It doesn t have to be difficult. Even brisk walking for 20 minutes 3 times a week is a good form of exercise.    Avoid medicines that have heart stimulants in them. This includes many cold and sinus decongestant pills and sprays, as well as diet pills. Check the warnings about hypertension on the label. Stimulants such as amphetamine or cocaine could be lethal for someone with hypertension. Never take these.    Limit how much caffeine you drink. Or switch to noncaffeinated beverages.    Stop smoking. If you are a long-time smoker, this can be hard. Enroll in a stop-smoking program to make it more likely that you will succeed. Talk with your provider about ways to quit.    Learn how to handle stress better. This is an important part of any program to lower blood pressure. Learn ways to relax. These include meditation, yoga, and biofeedback.    If medicines were prescribed, take them exactly as directed. Missing doses may cause your blood pressure to get out of control.    Consider buying an automatic blood pressure machine. These are available at many drugstores. Use this to monitor your blood pressure. Report the results to your provider.  Follow-up care  Regular visits to your own health care provider for blood pressure and medicine checks are an important part of your care. Make a follow-up appointment as directed.  When to seek medical advice  Call your health care provider right away if any of these occur:    Chest, arm, shoulder, neck, or upper back pain    Shortness of breath    Severe headache    Throbbing or rushing  sound in the ears    Nosebleed    Extreme drowsiness, confusion, or fainting    Dizziness or dizziness with spinning sensation (vertigo)    Weakness in an arm or leg or on one side of the face    Difficulty speaking or seeing     2338-7018 [employer]. [address] [place] [address], [place] [address]. All rights reserved. This information is not intended as a substitute for professional medical care. Always follow your healthcare professional's instructions.        Bronchitis, Antibiotic Treatment (Adult)    Bronchitis is an infection of the air passages (bronchial tubes) in your lungs. It often occurs when you have a cold. This illness is contagious during the first few days and is spread through the air by coughing and sneezing, or by direct contact (touching the sick person and then touching your own eyes, nose, or mouth).  Symptoms of bronchitis include cough with mucus (phlegm) and low-grade fever. Bronchitis usually lasts 7 to 14 days. Mild cases can be treated with simple home remedies. More severe infection is treated with an antibiotic.  Home care  Follow these guidelines when caring for yourself at home:    If your symptoms are severe, rest at home for the first 2 to 3 days. When you go back to your usual activities, don't let yourself get too tired.    Do not smoke. Also avoid being exposed to secondhand smoke.    You may use over-the-counter medicines to control fever or pain, unless another medicine was prescribed. (Note: If you have chronic liver or kidney disease or have ever had a stomach ulcer or gastrointestinal bleeding, talk with your healthcare provider before using these medicines. Also talk to your provider if you are taking medicine to prevent blood clots.) Aspirin should never be given to anyone younger than 18 years of age who is ill with a viral infection or fever. It may cause severe liver or brain damage.    Your appetite may be poor, so a light diet is fine. Avoid dehydration by  drinking 6 to 8 glasses of fluids per day (such as water, soft drinks, sports drinks, juices, tea, or soup). Extra fluids will help loosen secretions in the nose and lungs.    Over-the-counter cough, cold, and sore-throat medicines will not shorten the length of the illness, but they may be helpful to reduce symptoms. (Note: Do not use decongestants if you have high blood pressure.)    Finish all antibiotic medicine. Do this even if you are feeling better after only a few days.  Follow-up care  Follow up with your healthcare provider, or as advised. If you had an X-ray or ECG (electrocardiogram), a specialist will review it. You will be notified of any new findings that may affect your care.  Note: If you are age 65 or older, or if you have a chronic lung disease or condition that affects your immune system, or you smoke, talk to your healthcare provider about having pneumococcal vaccinations and a yearly influenza vaccination (flu shot).  When to seek medical advice  Call your healthcare provider right away if any of these occur:    Fever of 100.4 F (38 C) or higher    Coughing up increased amounts of colored sputum    Weakness, drowsiness, headache, facial pain, ear pain, or a stiff neck   Call 911, or get immediate medical care  Contact emergency services right away if any of these occur.    Coughing up blood    Worsening weakness, drowsiness, headache, or stiff neck    Trouble breathing, wheezing, or pain with breathing    5175-2202 [employer]. [address] [place] [address]. All rights reserved. This information is not intended as a substitute for professional medical care. Always follow your healthcare professional's instructions.        Sinusitis (Antibiotic Treatment)    The sinuses are air-filled spaces within the bones of the face. They connect to the inside of the nose. Sinusitis is an inflammation of the tissue lining the sinus cavity. Sinus inflammation can occur during a cold.  It can also be due to allergies to pollens and other particles in the air. Sinusitis can cause symptoms of sinus congestion and fullness. A sinus infection causes fever, headache and facial pain. There is often green or yellow drainage from the nose or into the back of the throat (post-nasal drip). You have been given antibiotics to treat this condition.  Home care:    Take the full course of antibiotics as instructed. Do not stop taking them, even if you feel better.    Drink plenty of water, hot tea, and other liquids. This may help thin mucus. It also may promote sinus drainage.    Heat may help soothe painful areas of the face. Use a towel soaked in hot water. Or,  the shower and direct the hot spray onto your face. Using a vaporizer along with a menthol rub at night may also help.     An expectorant containing guaifenesin may help thin the mucus and promote drainage from the sinuses.    Over-the-counter decongestants may be used unless a similar medicine was prescribed. Nasal sprays work the fastest. Use one that contains phenylephrine or oxymetazoline. First blow the nose gently. Then use the spray. Do not use these medicines more often than directed on the label or symptoms may get worse. You may also use tablets containing pseudoephedrine. Avoid products that combine ingredients, because side effects may be increased. Read labels. You can also ask the pharmacist for help. (NOTE: Persons with high blood pressure should not use decongestants. They can raise blood pressure.)    Over-the-counter antihistamines may help if allergies contributed to your sinusitis.      Do not use nasal rinses or irrigation during an acute sinus infection, unless told to by your health care provider. Rinsing may spread the infection to other sinuses.    Use acetaminophen or ibuprofen to control pain, unless another pain medicine was prescribed. (If you have chronic liver or kidney disease or ever had a stomach ulcer, talk  with your doctor before using these medicines. Aspirin should never be used in anyone under 18 years of age who is ill with a fever. It may cause severe liver damage.)    Don't smoke. This can worsen symptoms.  Follow-up care  Follow up with your healthcare provider or our staff if you are not improving within the next week.  When to seek medical advice  Call your healthcare provider if any of these occur:    Facial pain or headache becoming more severe    Stiff neck    Unusual drowsiness or confusion    Swelling of the forehead or eyelids    Vision problems, including blurred or double vision    Fever of 100.4 F (38 C) or higher, or as directed by your healthcare provider    Seizure    Breathing problems    Symptoms not resolving within 10 days    4541-2573 [employer]. [address] [place] [address], [place] [address]. All rights reserved. This information is not intended as a substitute for professional medical care. Always follow your healthcare professional's instructions.            LYLE Flores CNP

## 2017-03-31 NOTE — MR AVS SNAPSHOT
After Visit Summary   3/31/2017    Mary Dixon    MRN: [id number]           Patient Information     Date Of Birth          1980        Visit Information        Provider Department      3/31/2017 12:00 PM Dyana Hendrickson APRN [facility]        Today's Diagnoses     Acute sinusitis with coexisting condition requiring prophylactic treatment    -  1    Acute bronchitis with coexisting condition requiring prophylactic treatment        Essential hypertension          Care Instructions    Medications as directed   Symptom Relief: Afdrin do not use greater then 3 days  Intranasal corticosteroid   Flonase has  been prescribed.     Tylenol or Ibuprofen if able to take for fevers and discomfort.  Do not exceed 4 grams of Tylenol in a 24 hour period.  Take Ibuprofen with food.  Hydrate with fluids, rest, cool humidifier.  May use acetaminophen, ibuprofen prn.    For your Cough   The Buckwheat Honey Dose: Given   hour Prior to Bedtime  For children age under 1 year -Do not use due to botulism risk     2-5 years -  tsp (2.5 mL)    6-11 years -1 tsp (5 mL)    12-18 years -2 tsp (10 mL)     Guaifenesin     Adult dose -Not to exceed 2.4 g (2400mg) per day    Child age 6-12 years -100 mg every 4 hr, not to exceed 1.2 g (1200mg) per day     Pediatric, 2-6 years -50 mg every 4 hr as needed, not to exceed 600 mg per day    Cough medications is not recommended in children under 2 years.  With use of cough medications have combination medications be aware of products in the cough medication you are using to avoid overdose    Follow up with PCP in 2 weeks  .    Go to Emergency Room if sx worsen or change, Shortness of breath, chest pain, persistent fevers, or painful breathing occur.     Patient voiced understanding of instructions given.      Follow-up with your primary care provider next week and as needed.    Indications for emergent return to emergency department discussed with patient,  who verbalized good understanding and agreement.  Patient understands the limitations of today's evaluation.           Out of Control High Blood Pressure (Established)    Your blood pressure was unusually high today. This can occur if you ve missed doses of your blood pressure medicine. Or it can happen if you are taking other medicines. These include some asthma inhalers, decongestants, diet pills, and street drugs like cocaine and amphetamine.  Other causes include:    Weight gain    More salt in your diet    Smoking    Caffeine  Your blood pressure can also rise if you are emotionally upset or in intense pain. It may go back to normal after a period of rest.  A blood pressure reading is made up of 2 numbers. There is a top number over a bottom number. The top number is the systolic pressure. The bottom number is the diastolic pressure. A normal blood pressure is less than 120 over less than 80. High blood pressure (hypertension) is when the top number is 140 or higher. Or it is when the bottom number is 90 or higher. To be high blood pressure, the numbers must be higher when tested over a period of time. The blood pressures between normal and hypertension are called prehypertension.  Home care  It s important to take steps to lower your blood pressure. If you are taking blood pressure medicine, the guidelines below may help you need less or no medicines in the future.    Begin a weight-loss program if you are overweight.    Cut back on the amount of salt in your diet:    Avoid high-salt foods like olives, pickles, smoked meats, and salted potato chips.    Don t add salt to your food at the table.    Use only small amounts of salt when cooking.    Begin an exercise program. Talk with your health care provider about what exercise program is best for you. It doesn t have to be difficult. Even brisk walking for 20 minutes 3 times a week is a good form of exercise.    Avoid medicines that have heart stimulants in  them. This includes many cold and sinus decongestant pills and sprays, as well as diet pills. Check the warnings about hypertension on the label. Stimulants such as amphetamine or cocaine could be lethal for someone with hypertension. Never take these.    Limit how much caffeine you drink. Or switch to noncaffeinated beverages.    Stop smoking. If you are a long-time smoker, this can be hard. Enroll in a stop-smoking program to make it more likely that you will succeed. Talk with your provider about ways to quit.    Learn how to handle stress better. This is an important part of any program to lower blood pressure. Learn ways to relax. These include meditation, yoga, and biofeedback.    If medicines were prescribed, take them exactly as directed. Missing doses may cause your blood pressure to get out of control.    Consider buying an automatic blood pressure machine. These are available at many drugstores. Use this to monitor your blood pressure. Report the results to your provider.  Follow-up care  Regular visits to your own health care provider for blood pressure and medicine checks are an important part of your care. Make a follow-up appointment as directed.  When to seek medical advice  Call your health care provider right away if any of these occur:    Chest, arm, shoulder, neck, or upper back pain    Shortness of breath    Severe headache    Throbbing or rushing sound in the ears    Nosebleed    Extreme drowsiness, confusion, or fainting    Dizziness or dizziness with spinning sensation (vertigo)    Weakness in an arm or leg or on one side of the face    Difficulty speaking or seeing     3500-6683 [employer]. [address] [place] [address], [place] [address]. All rights reserved. This information is not intended as a substitute for professional medical care. Always follow your healthcare professional's instructions.        Bronchitis, Antibiotic Treatment (Adult)    Bronchitis is an infection of the air  passages (bronchial tubes) in your lungs. It often occurs when you have a cold. This illness is contagious during the first few days and is spread through the air by coughing and sneezing, or by direct contact (touching the sick person and then touching your own eyes, nose, or mouth).  Symptoms of bronchitis include cough with mucus (phlegm) and low-grade fever. Bronchitis usually lasts 7 to 14 days. Mild cases can be treated with simple home remedies. More severe infection is treated with an antibiotic.  Home care  Follow these guidelines when caring for yourself at home:    If your symptoms are severe, rest at home for the first 2 to 3 days. When you go back to your usual activities, don't let yourself get too tired.    Do not smoke. Also avoid being exposed to secondhand smoke.    You may use over-the-counter medicines to control fever or pain, unless another medicine was prescribed. (Note: If you have chronic liver or kidney disease or have ever had a stomach ulcer or gastrointestinal bleeding, talk with your healthcare provider before using these medicines. Also talk to your provider if you are taking medicine to prevent blood clots.) Aspirin should never be given to anyone younger than 18 years of age who is ill with a viral infection or fever. It may cause severe liver or brain damage.    Your appetite may be poor, so a light diet is fine. Avoid dehydration by drinking 6 to 8 glasses of fluids per day (such as water, soft drinks, sports drinks, juices, tea, or soup). Extra fluids will help loosen secretions in the nose and lungs.    Over-the-counter cough, cold, and sore-throat medicines will not shorten the length of the illness, but they may be helpful to reduce symptoms. (Note: Do not use decongestants if you have high blood pressure.)    Finish all antibiotic medicine. Do this even if you are feeling better after only a few days.  Follow-up care  Follow up with your healthcare provider, or as advised. If  you had an X-ray or ECG (electrocardiogram), a specialist will review it. You will be notified of any new findings that may affect your care.  Note: If you are age 65 or older, or if you have a chronic lung disease or condition that affects your immune system, or you smoke, talk to your healthcare provider about having pneumococcal vaccinations and a yearly influenza vaccination (flu shot).  When to seek medical advice  Call your healthcare provider right away if any of these occur:    Fever of 100.4 F (38 C) or higher    Coughing up increased amounts of colored sputum    Weakness, drowsiness, headache, facial pain, ear pain, or a stiff neck   Call 911, or get immediate medical care  Contact emergency services right away if any of these occur.    Coughing up blood    Worsening weakness, drowsiness, headache, or stiff neck    Trouble breathing, wheezing, or pain with breathing    6757-4084 [employer]. [address] [place] [address]. All rights reserved. This information is not intended as a substitute for professional medical care. Always follow your healthcare professional's instructions.        Sinusitis (Antibiotic Treatment)    The sinuses are air-filled spaces within the bones of the face. They connect to the inside of the nose. Sinusitis is an inflammation of the tissue lining the sinus cavity. Sinus inflammation can occur during a cold. It can also be due to allergies to pollens and other particles in the air. Sinusitis can cause symptoms of sinus congestion and fullness. A sinus infection causes fever, headache and facial pain. There is often green or yellow drainage from the nose or into the back of the throat (post-nasal drip). You have been given antibiotics to treat this condition.  Home care:    Take the full course of antibiotics as instructed. Do not stop taking them, even if you feel better.    Drink plenty of water, hot tea, and other liquids. This may help thin mucus. It  also may promote sinus drainage.    Heat may help soothe painful areas of the face. Use a towel soaked in hot water. Or,  the shower and direct the hot spray onto your face. Using a vaporizer along with a menthol rub at night may also help.     An expectorant containing guaifenesin may help thin the mucus and promote drainage from the sinuses.    Over-the-counter decongestants may be used unless a similar medicine was prescribed. Nasal sprays work the fastest. Use one that contains phenylephrine or oxymetazoline. First blow the nose gently. Then use the spray. Do not use these medicines more often than directed on the label or symptoms may get worse. You may also use tablets containing pseudoephedrine. Avoid products that combine ingredients, because side effects may be increased. Read labels. You can also ask the pharmacist for help. (NOTE: Persons with high blood pressure should not use decongestants. They can raise blood pressure.)    Over-the-counter antihistamines may help if allergies contributed to your sinusitis.      Do not use nasal rinses or irrigation during an acute sinus infection, unless told to by your health care provider. Rinsing may spread the infection to other sinuses.    Use acetaminophen or ibuprofen to control pain, unless another pain medicine was prescribed. (If you have chronic liver or kidney disease or ever had a stomach ulcer, talk with your doctor before using these medicines. Aspirin should never be used in anyone under 18 years of age who is ill with a fever. It may cause severe liver damage.)    Don't smoke. This can worsen symptoms.  Follow-up care  Follow up with your healthcare provider or our staff if you are not improving within the next week.  When to seek medical advice  Call your healthcare provider if any of these occur:    Facial pain or headache becoming more severe    Stiff neck    Unusual drowsiness or confusion    Swelling of the forehead or eyelids    Vision  problems, including blurred or double vision    Fever of 100.4 F (38 C) or higher, or as directed by your healthcare provider    Seizure    Breathing problems    Symptoms not resolving within 10 days    3780-3841 [employer]. [address] [place] [address], [place] [address]. All rights reserved. This information is not intended as a substitute for professional medical care. Always follow your healthcare professional's instructions.              Follow-ups after your visit        Follow-up notes from your care team     Return in about 1 week (around 4/7/2017) for RN Blood Pressure Check.      Who to contact     If you have questions or need follow up information about today's clinic visit or your schedule please contact [facility] directly at [phone].  Normal or non-critical lab and imaging results will be communicated to you by Next[employer], letter or phone within 4 business days after the clinic has received the results. If you do not hear from us within 7 days, please contact the clinic through Next[employer] or phone. If you have a critical or abnormal lab result, we will notify you by phone as soon as possible.  Submit refill requests through [employer] or call your pharmacy and they will forward the refill request to us. Please allow 3 business days for your refill to be completed.          Additional Information About Your Visit        NextanceharRayn Information     [employer] gives you secure access to your electronic health record. If you see a primary care provider, you can also send messages to your care team and make appointments. If you have questions, please call your primary care clinic.  If you do not have a primary care provider, please call [phone] and they will assist you.        Care EveryWhere ID     This is your Care EveryWhere ID. This could be used by other organizations to access your [facility] medical records  [id number]        Your Vitals Were     Pulse Temperature BMI (Body  Mass Index)             89 97  F (36.1  C) (Tympanic) 52.43 kg/m2          Blood Pressure from Last 3 Encounters:   03/31/17 (!) 142/94   03/27/17 112/72   09/30/16 128/82    Weight from Last 3 Encounters:   03/31/17 (!) 353 lb (160.1 kg)   03/27/17 (!) 357 lb (161.9 kg)   09/30/16 (!) 361 lb 6.4 oz (163.9 kg)              Today, you had the following     No orders found for display         Today's Medication Changes          These changes are accurate as of: 3/31/17 12:43 PM.  If you have any questions, ask your nurse or doctor.               Start taking these medicines.        Dose/Directions    albuterol 108 (90 BASE) MCG/ACT Inhaler   Commonly known as:  PROAIR HFA/PROVENTIL HFA/VENTOLIN HFA   Used for:  Acute bronchitis with coexisting condition requiring prophylactic treatment   Started by:  Dyana Hendrickson APRN CNP        Dose:  2 puff   Inhale 2 puffs into the lungs every 6 hours as needed for shortness of breath / dyspnea or wheezing   Quantity:  1 Inhaler   Refills:  0       amoxicillin-clavulanate 875-125 MG per tablet   Commonly known as:  AUGMENTIN   Used for:  Acute bronchitis with coexisting condition requiring prophylactic treatment, Acute sinusitis with coexisting condition requiring prophylactic treatment   Started by:  Dyana Hendrickson APRN CNP        Dose:  1 tablet   Take 1 tablet by mouth 2 times daily   Quantity:  20 tablet   Refills:  0       benzonatate 100 MG capsule   Commonly known as:  TESSALON PERLES   Used for:  Acute bronchitis with coexisting condition requiring prophylactic treatment   Started by:  Dyana Hendrickson APRN CNP        Dose:  100 mg   Take 1 capsule (100 mg) by mouth 3 times daily as needed for cough   Quantity:  42 capsule   Refills:  0       fluticasone 50 MCG/ACT spray   Commonly known as:  FLONASE   Used for:  Acute sinusitis with coexisting condition requiring prophylactic treatment   Started by:  Dyana Hendrickson APRN CNP        Dose:  1-2 spray   Spray 1-2  sprays into both nostrils daily   Quantity:  16 g   Refills:  0       predniSONE 20 MG tablet   Commonly known as:  DELTASONE   Used for:  Acute bronchitis with coexisting condition requiring prophylactic treatment   Started by:  Dyana Hendrickson APRN CNP        Take one tablet twice a day for 5 days.   Quantity:  10 tablet   Refills:  0         These medicines have changed or have updated prescriptions.        Dose/Directions    * lisinopril 10 MG tablet   Commonly known as:  PRINIVIL/ZESTRIL   This may have changed:  Another medication with the same name was added. Make sure you understand how and when to take each.   Used for:  Essential hypertension   Changed by:  Dyana Hendrickson APRN CNP        Dose:  10 mg   Take 1 tablet (10 mg) by mouth daily   Quantity:  30 tablet   Refills:  1       * lisinopril 10 MG tablet   Commonly known as:  PRINIVIL/ZESTRIL   This may have changed:  You were already taking a medication with the same name, and this prescription was added. Make sure you understand how and when to take each.   Used for:  Essential hypertension   Changed by:  Dyana Hendrickson APRN CNP        Dose:  10 mg   Take 1 tablet (10 mg) by mouth daily   Quantity:  30 tablet   Refills:  0       * Notice:  This list has 2 medication(s) that are the same as other medications prescribed for you. Read the directions carefully, and ask your doctor or other care provider to review them with you.         Where to get your medicines      These medications were sent to [facility] [address] [address]     Phone:  [phone]     albuterol 108 (90 BASE) MCG/ACT Inhaler    amoxicillin-clavulanate 875-125 MG per tablet    benzonatate 100 MG capsule    fluticasone 50 MCG/ACT spray    lisinopril 10 MG tablet    predniSONE 20 MG tablet                Primary Care Provider Office Phone # Fax #    Aldo Del Valle [phone]        [facility] [address]  [facility] [address]        Thank you!     Thank you for choosing [facility]  for your care. Our goal is always to provide you with excellent care. Hearing back from our patients is one way we can continue to improve our services. Please take a few minutes to complete the written survey that you may receive in the mail after your visit with us. Thank you!             Your Updated Medication List - Protect others around you: Learn how to safely use, store and throw away your medicines at www.disposemymeds.org.          This list is accurate as of: 3/31/17 12:43 PM.  Always use your most recent med list.                   Brand Name Dispense Instructions for use    albuterol 108 (90 BASE) MCG/ACT Inhaler    PROAIR HFA/PROVENTIL HFA/VENTOLIN HFA    1 Inhaler    Inhale 2 puffs into the lungs every 6 hours as needed for shortness of breath / dyspnea or wheezing       amoxicillin-clavulanate 875-125 MG per tablet    AUGMENTIN    20 tablet    Take 1 tablet by mouth 2 times daily       benzonatate 100 MG capsule    TESSALON PERLES    42 capsule    Take 1 capsule (100 mg) by mouth 3 times daily as needed for cough       fluticasone 50 MCG/ACT spray    FLONASE    16 g    Spray 1-2 sprays into both nostrils daily       * lisinopril 10 MG tablet    PRINIVIL/ZESTRIL    30 tablet    Take 1 tablet (10 mg) by mouth daily       * lisinopril 10 MG tablet    PRINIVIL/ZESTRIL    30 tablet    Take 1 tablet (10 mg) by mouth daily       metFORMIN 500 MG 24 hr tablet    GLUCOPHAGE-XR     Take 500 mg by mouth 2 times daily (with meals) Reported on 3/31/2017       omeprazole 20 MG CR capsule    priLOSEC    90 capsule    Take 1 capsule (20 mg) by mouth daily       order for DME      Reported on 3/27/2017       predniSONE 20 MG tablet    DELTASONE    10 tablet    Take one tablet twice a day for 5 days.       * Notice:  This list has 2 medication(s) that are the same as other  medications prescribed for you. Read the directions carefully, and ask your doctor or other care provider to review them with you.

## 2017-05-20 ENCOUNTER — ALLIED HEALTH/NURSE VISIT (OUTPATIENT)
Dept: FAMILY MEDICINE | Facility: CLINIC | Age: 37
End: 2017-05-20
Payer: COMMERCIAL

## 2017-05-20 ENCOUNTER — HOSPITAL ENCOUNTER (EMERGENCY)
Facility: CLINIC | Age: 37
Discharge: HOME | End: 2017-05-20
Attending: FAMILY MEDICINE | Admitting: FAMILY MEDICINE
Payer: COMMERCIAL

## 2017-05-20 VITALS
RESPIRATION RATE: 16 BRPM | SYSTOLIC BLOOD PRESSURE: 124 MMHG | HEIGHT: 69 IN | DIASTOLIC BLOOD PRESSURE: 73 MMHG | OXYGEN SATURATION: 96 % | TEMPERATURE: 97.8 F | BODY MASS INDEX: 43.4 KG/M2 | WEIGHT: 293 LBS

## 2017-05-20 DIAGNOSIS — K52.9 GASTROENTERITIS: ICD-10-CM

## 2017-05-20 DIAGNOSIS — Z01.30 BP CHECK: Primary | ICD-10-CM

## 2017-05-20 LAB
ALBUMIN SERPL-MCNC: 4 G/DL (ref 3.4–5)
ALP SERPL-CCNC: 156 U/L (ref 40–150)
ALT SERPL W P-5'-P-CCNC: 35 U/L (ref 0–50)
AMYLASE SERPL-CCNC: 15 U/L (ref 30–110)
ANION GAP SERPL CALCULATED.3IONS-SCNC: 12 MMOL/L (ref 3–14)
AST SERPL W P-5'-P-CCNC: 30 U/L (ref 0–45)
BASOPHILS # BLD AUTO: 0 10E9/L (ref 0–0.2)
BASOPHILS NFR BLD AUTO: 0.3 %
BILIRUB SERPL-MCNC: 0.4 MG/DL (ref 0.2–1.3)
BUN SERPL-MCNC: 12 MG/DL (ref 7–30)
CALCIUM SERPL-MCNC: 9.4 MG/DL (ref 8.5–10.1)
CHLORIDE SERPL-SCNC: 107 MMOL/L (ref 94–109)
CO2 SERPL-SCNC: 21 MMOL/L (ref 20–32)
CREAT SERPL-MCNC: 0.71 MG/DL (ref 0.52–1.04)
DIFFERENTIAL METHOD BLD: ABNORMAL
EOSINOPHIL # BLD AUTO: 0.2 10E9/L (ref 0–0.7)
EOSINOPHIL NFR BLD AUTO: 2.1 %
ERYTHROCYTE [DISTWIDTH] IN BLOOD BY AUTOMATED COUNT: 12.9 % (ref 10–15)
GFR SERPL CREATININE-BSD FRML MDRD: ABNORMAL ML/MIN/1.7M2
GLUCOSE SERPL-MCNC: 148 MG/DL (ref 70–99)
HCT VFR BLD AUTO: 46.5 % (ref 35–47)
HGB BLD-MCNC: 15.6 G/DL (ref 11.7–15.7)
IMM GRANULOCYTES # BLD: 0 10E9/L (ref 0–0.4)
IMM GRANULOCYTES NFR BLD: 0.4 %
LYMPHOCYTES # BLD AUTO: 3 10E9/L (ref 0.8–5.3)
LYMPHOCYTES NFR BLD AUTO: 29.5 %
MCH RBC QN AUTO: 29.8 PG (ref 26.5–33)
MCHC RBC AUTO-ENTMCNC: 33.5 G/DL (ref 31.5–36.5)
MCV RBC AUTO: 89 FL (ref 78–100)
MONOCYTES # BLD AUTO: 0.8 10E9/L (ref 0–1.3)
MONOCYTES NFR BLD AUTO: 7.8 %
NEUTROPHILS # BLD AUTO: 6.2 10E9/L (ref 1.6–8.3)
NEUTROPHILS NFR BLD AUTO: 59.9 %
PLATELET # BLD AUTO: 382 10E9/L (ref 150–450)
POTASSIUM SERPL-SCNC: 4.6 MMOL/L (ref 3.4–5.3)
PROT SERPL-MCNC: 7.9 G/DL (ref 6.8–8.8)
RBC # BLD AUTO: 5.24 10E12/L (ref 3.8–5.2)
SODIUM SERPL-SCNC: 140 MMOL/L (ref 133–144)
WBC # BLD AUTO: 10.3 10E9/L (ref 4–11)

## 2017-05-20 PROCEDURE — 82150 ASSAY OF AMYLASE: CPT | Performed by: FAMILY MEDICINE

## 2017-05-20 PROCEDURE — 96374 THER/PROPH/DIAG INJ IV PUSH: CPT

## 2017-05-20 PROCEDURE — 96361 HYDRATE IV INFUSION ADD-ON: CPT

## 2017-05-20 PROCEDURE — 99284 EMERGENCY DEPT VISIT MOD MDM: CPT | Mod: 25

## 2017-05-20 PROCEDURE — 80053 COMPREHEN METABOLIC PANEL: CPT | Performed by: FAMILY MEDICINE

## 2017-05-20 PROCEDURE — 99284 EMERGENCY DEPT VISIT MOD MDM: CPT | Performed by: FAMILY MEDICINE

## 2017-05-20 PROCEDURE — 25000128 H RX IP 250 OP 636: Performed by: FAMILY MEDICINE

## 2017-05-20 PROCEDURE — 85025 COMPLETE CBC W/AUTO DIFF WBC: CPT | Performed by: FAMILY MEDICINE

## 2017-05-20 PROCEDURE — 99207 ZZC NO CHARGE NURSE ONLY: CPT | Performed by: NURSE PRACTITIONER

## 2017-05-20 RX ORDER — ONDANSETRON 2 MG/ML
4 INJECTION INTRAMUSCULAR; INTRAVENOUS ONCE
Status: COMPLETED | OUTPATIENT
Start: 2017-05-20 | End: 2017-05-20

## 2017-05-20 RX ADMIN — SODIUM CHLORIDE 2000 ML: 9 INJECTION, SOLUTION INTRAVENOUS at 10:18

## 2017-05-20 RX ADMIN — ONDANSETRON 4 MG: 2 INJECTION INTRAMUSCULAR; INTRAVENOUS at 10:18

## 2017-05-20 ASSESSMENT — ENCOUNTER SYMPTOMS
DYSURIA: 0
MUSCULOSKELETAL NEGATIVE: 1
CHILLS: 0
COUGH: 0
EYES NEGATIVE: 1
CONSTIPATION: 0
DIARRHEA: 1
VOMITING: 0
PSYCHIATRIC NEGATIVE: 1
NAUSEA: 1
HEMATOLOGIC/LYMPHATIC NEGATIVE: 1
ABDOMINAL PAIN: 1
FEVER: 0
SORE THROAT: 0
FREQUENCY: 0
DIZZINESS: 0
SHORTNESS OF BREATH: 0

## 2017-05-20 NOTE — ED AVS SNAPSHOT
[facility] Emergency Department    [address] [facility] [address]    Phone:  [phone]    Fax:  [phone]                                       Mary Dixon   MRN: [id number]    Department:  [facility] Emergency Department   Date of Visit:  5/20/2017           After Visit Summary Signature Page     I have received my discharge instructions, and my questions have been answered. I have discussed any challenges I see with this plan with the nurse or doctor.    ..........................................................................................................................................  Patient/Patient Representative Signature      ..........................................................................................................................................  Patient Representative Print Name and Relationship to Patient    ..................................................               ................................................  Date                                            Time    ..........................................................................................................................................  Reviewed by Signature/Title    ...................................................              ..............................................  Date                                                            Time

## 2017-05-20 NOTE — ED AVS SNAPSHOT
[facility] Emergency Department    [address] [address]    Phone:  [phone]    Fax:  [phone]                                       Mary Dixon   MRN: [id number]    Department:  [facility] Emergency Department   Date of Visit:  5/20/2017           Patient Information     Date Of Birth          1980        Your diagnoses for this visit were:     Gastroenteritis        You were seen by Naveed Elder MD.        Discharge Instructions       Clear liquids and advance as tolerated.    Return for worsening diarrhea, vomiting, fever or other concerning symptoms.    24 Hour Appointment Hotline       To make an appointment at any [facility] clinic, call [phone] ([phone]). If you don't have a family doctor or clinic, we will help you find one. [facility] clinics are conveniently located to serve the needs of you and your family.             Review of your medicines      Our records show that you are taking the medicines listed below. If these are incorrect, please call your family doctor or clinic.        Dose / Directions Last dose taken    albuterol 108 (90 BASE) MCG/ACT Inhaler   Commonly known as:  PROAIR HFA/PROVENTIL HFA/VENTOLIN HFA   Dose:  2 puff   Quantity:  1 Inhaler        Inhale 2 puffs into the lungs every 6 hours as needed for shortness of breath / dyspnea or wheezing   Refills:  0        fluticasone 50 MCG/ACT spray   Commonly known as:  FLONASE   Dose:  1-2 spray   Quantity:  16 g        Spray 1-2 sprays into both nostrils daily   Refills:  0        lisinopril 10 MG tablet   Commonly known as:  PRINIVIL/ZESTRIL   Dose:  10 mg   Quantity:  30 tablet        Take 1 tablet (10 mg) by mouth daily   Refills:  0                Procedures and tests performed during your visit     Amylase    CBC with platelets, differential    Comprehensive metabolic panel      Orders Needing Specimen Collection     None      Pending Results     No orders found from  5/18/2017 to 5/21/2017.            Pending Culture Results     No orders found from 5/18/2017 to 5/21/2017.            Pending Results Instructions     If you had any lab results that were not finalized at the time of your Discharge, you can call the ED Lab Result RN at [phone]. You will be contacted by this team for any positive Lab results or changes in treatment. The nurses are available 7 days a week from 10A to 6:30P.  You can leave a message 24 hours per day and they will return your call.        Test Results From Your Hospital Stay        5/20/2017  9:46 AM      Component Results     Component Value Ref Range & Units Status    WBC 10.3 4.0 - 11.0 10e9/L Final    RBC Count 5.24 (H) 3.8 - 5.2 10e12/L Final    Hemoglobin 15.6 11.7 - 15.7 g/dL Final    Hematocrit 46.5 35.0 - 47.0 % Final    MCV 89 78 - 100 fl Final    MCH 29.8 26.5 - 33.0 pg Final    MCHC 33.5 31.5 - 36.5 g/dL Final    RDW 12.9 10.0 - 15.0 % Final    Platelet Count 382 150 - 450 10e9/L Final    Diff Method Automated Method  Final    % Neutrophils 59.9 % Final    % Lymphocytes 29.5 % Final    % Monocytes 7.8 % Final    % Eosinophils 2.1 % Final    % Basophils 0.3 % Final    % Immature Granulocytes 0.4 % Final    Absolute Neutrophil 6.2 1.6 - 8.3 10e9/L Final    Absolute Lymphocytes 3.0 0.8 - 5.3 10e9/L Final    Absolute Monocytes 0.8 0.0 - 1.3 10e9/L Final    Absolute Eosinophils 0.2 0.0 - 0.7 10e9/L Final    Absolute Basophils 0.0 0.0 - 0.2 10e9/L Final    Abs Immature Granulocytes 0.0 0 - 0.4 10e9/L Final         5/20/2017 10:03 AM      Component Results     Component Value Ref Range & Units Status    Sodium 140 133 - 144 mmol/L Final    Specimen slightly hemolyzed, potassium may be falsely elevated    Potassium 4.6 3.4 - 5.3 mmol/L Final    Chloride 107 94 - 109 mmol/L Final    Carbon Dioxide 21 20 - 32 mmol/L Final    Anion Gap 12 3 - 14 mmol/L Final    Glucose 148 (H) 70 - 99 mg/dL Final    Urea Nitrogen 12 7 - 30 mg/dL Final     Creatinine 0.71 0.52 - 1.04 mg/dL Final    GFR Estimate >90  Non  GFR Calc   >60 mL/min/1.7m2 Final    GFR Estimate If Black >90   GFR Calc   >60 mL/min/1.7m2 Final    Calcium 9.4 8.5 - 10.1 mg/dL Final    Bilirubin Total 0.4 0.2 - 1.3 mg/dL Final    Albumin 4.0 3.4 - 5.0 g/dL Final    Protein Total 7.9 6.8 - 8.8 g/dL Final    Alkaline Phosphatase 156 (H) 40 - 150 U/L Final    ALT 35 0 - 50 U/L Final    AST 30 0 - 45 U/L Final         5/20/2017 10:03 AM      Component Results     Component Value Ref Range & Units Status    Amylase 15 (L) 30 - 110 U/L Final                Thank you for choosing [facility]       Thank you for choosing [facility] for your care. Our goal is always to provide you with excellent care. Hearing back from our patients is one way we can continue to improve our services. Please take a few minutes to complete the written survey that you may receive in the mail after you visit with us. Thank you!        [employer] Information     [employer] gives you secure access to your electronic health record. If you see a primary care provider, you can also send messages to your care team and make appointments. If you have questions, please call your primary care clinic.  If you do not have a primary care provider, please call [phone] and they will assist you.        Care EveryWhere ID     This is your Care EveryWhere ID. This could be used by other organizations to access your [facility] medical records  [id number]        After Visit Summary       This is your record. Keep this with you and show to your community pharmacist(s) and doctor(s) at your next visit.

## 2017-05-20 NOTE — DISCHARGE INSTRUCTIONS
Clear liquids and advance as tolerated.    Return for worsening diarrhea, vomiting, fever or other concerning symptoms.

## 2017-05-20 NOTE — LETTER
[facility] EMERGENCY DEPARTMENT  [address] [address]  [phone]      May 20, 2017      Mary Dixon  [address] [address]        Dear Mary,      You were seen today for medical treatment.    You are unable to work May 20, 2017.        Sincerely,        Naveed Elder MD

## 2017-05-20 NOTE — ED PROVIDER NOTES
History     Chief Complaint   Patient presents with     Nausea     abd pain with gas  started last evening, diarhea started during the night. 15 stools during the night     Diarrhea     Abdominal Pain     HPI  Mary Dixon is a 37 year old female with a history significant for hypertension, obesity, constipation and ovarian cancer who presents to the ED today for evaluation of nausea, diarrhea and abdominal cramping. 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 Patient states last night at 1830 she ate at [employer] and about two hours later started experiencing 'gassy' abdominal cramping. She reports normal production of stool that progressed into 15 soft bowel movements throughout the night. Denies constipation prior to present symptoms. Patient states that on a normal day she has around one bowel movement. Admits to nausea without production of emesis. Denies a fever. Admits to inadequate fluid intake. Presents to ED with request for fluids. Denies any new rashes, dysuria, urinary frequency, cough, and sore throat. Denies any recent medication changes. Denies at home exposure to any illnesses. Denies any previous GI problems. Patient's past abdominal surgeries include bilateral salpingo-oophorectomy and appendectomy in 2008.       Patient Active Problem List   Diagnosis     Benign intracranial hypertension     Urgency of urination     Labyrinthitis     Status post oophorectomy     Mood and affect disturbance (H)     DUB (dysfunctional uterine bleeding)     Fatigue     Knee injury     Tobacco abuse     Obesity     Constipation     Breast lump     Attention deficit disorder of adult     Myofascial pain     CARDIOVASCULAR SCREENING; LDL GOAL LESS THAN 160     Personal history of ovarian cancer     HTN (hypertension)     Impaired fasting glucose     PMB (postmenopausal bleeding)     Prediabetes     Pseudotumor cerebri     Current Outpatient Prescriptions   Medication Sig Dispense  "Refill     lisinopril (PRINIVIL/ZESTRIL) 10 MG tablet Take 1 tablet (10 mg) by mouth daily 30 tablet 0     albuterol (PROAIR HFA/PROVENTIL HFA/VENTOLIN HFA) 108 (90 BASE) MCG/ACT Inhaler Inhale 2 puffs into the lungs every 6 hours as needed for shortness of breath / dyspnea or wheezing 1 Inhaler 0     fluticasone (FLONASE) 50 MCG/ACT spray Spray 1-2 sprays into both nostrils daily 16 g 0     [DISCONTINUED] lisinopril (PRINIVIL,ZESTRIL) 10 MG tablet Take 1 tablet (10 mg) by mouth daily (Patient not taking: Reported on 3/27/2017) 30 tablet 1     No Known Allergies    I have reviewed the Medications, Allergies, Past Medical and Surgical History, and Social History in the Epic system.    Review of Systems   Constitutional: Negative for chills and fever.   HENT: Negative for congestion and sore throat.    Eyes: Negative.    Respiratory: Negative for cough and shortness of breath.    Cardiovascular: Negative for chest pain.   Gastrointestinal: Positive for abdominal pain, diarrhea and nausea. Negative for constipation and vomiting.   Genitourinary: Negative for dysuria and frequency.   Musculoskeletal: Negative.    Skin: Negative for rash.   Neurological: Negative for dizziness.   Hematological: Negative.    Psychiatric/Behavioral: Negative.        Physical Exam   BP: (!) 166/111  Heart Rate: 115  Temp: 97.8  F (36.6  C)  Resp: 16  Height: 175.3 cm (5' 9\")  Weight: (!) 161.9 kg (357 lb)  SpO2: 100 %  Physical Exam   Constitutional: She appears well-developed. No distress.   HENT:   Head: Normocephalic.   Eyes: No scleral icterus.   Neck: Neck supple. No thyromegaly present.   Cardiovascular: Regular rhythm.    No murmur heard.  Pulmonary/Chest: Breath sounds normal.   Abdominal: Bowel sounds are decreased.   Minimal LLQ tenderness w/o guarding.   Musculoskeletal: She exhibits no edema or tenderness.   Neurological: Coordination normal.   Skin: No rash noted.   Psychiatric: She has a normal mood and affect.       ED Course "     ED Course     Procedures           Results for orders placed or performed during the hospital encounter of 05/20/17   CBC with platelets, differential   Result Value Ref Range    WBC 10.3 4.0 - 11.0 10e9/L    RBC Count 5.24 (H) 3.8 - 5.2 10e12/L    Hemoglobin 15.6 11.7 - 15.7 g/dL    Hematocrit 46.5 35.0 - 47.0 %    MCV 89 78 - 100 fl    MCH 29.8 26.5 - 33.0 pg    MCHC 33.5 31.5 - 36.5 g/dL    RDW 12.9 10.0 - 15.0 %    Platelet Count 382 150 - 450 10e9/L    Diff Method Automated Method     % Neutrophils 59.9 %    % Lymphocytes 29.5 %    % Monocytes 7.8 %    % Eosinophils 2.1 %    % Basophils 0.3 %    % Immature Granulocytes 0.4 %    Absolute Neutrophil 6.2 1.6 - 8.3 10e9/L    Absolute Lymphocytes 3.0 0.8 - 5.3 10e9/L    Absolute Monocytes 0.8 0.0 - 1.3 10e9/L    Absolute Eosinophils 0.2 0.0 - 0.7 10e9/L    Absolute Basophils 0.0 0.0 - 0.2 10e9/L    Abs Immature Granulocytes 0.0 0 - 0.4 10e9/L   Comprehensive metabolic panel   Result Value Ref Range    Sodium 140 133 - 144 mmol/L    Potassium 4.6 3.4 - 5.3 mmol/L    Chloride 107 94 - 109 mmol/L    Carbon Dioxide 21 20 - 32 mmol/L    Anion Gap 12 3 - 14 mmol/L    Glucose 148 (H) 70 - 99 mg/dL    Urea Nitrogen 12 7 - 30 mg/dL    Creatinine 0.71 0.52 - 1.04 mg/dL    GFR Estimate >90  Non  GFR Calc   >60 mL/min/1.7m2    GFR Estimate If Black >90   GFR Calc   >60 mL/min/1.7m2    Calcium 9.4 8.5 - 10.1 mg/dL    Bilirubin Total 0.4 0.2 - 1.3 mg/dL    Albumin 4.0 3.4 - 5.0 g/dL    Protein Total 7.9 6.8 - 8.8 g/dL    Alkaline Phosphatase 156 (H) 40 - 150 U/L    ALT 35 0 - 50 U/L    AST 30 0 - 45 U/L   Amylase   Result Value Ref Range    Amylase 15 (L) 30 - 110 U/L         Critical Care time:  none               Labs Ordered and Resulted from Time of ED Arrival Up to the Time of Departure from the ED   CBC WITH PLATELETS DIFFERENTIAL - Abnormal; Notable for the following:        Result Value    RBC Count 5.24 (*)     All other components  within normal limits   COMPREHENSIVE METABOLIC PANEL - Abnormal; Notable for the following:     Glucose 148 (*)     Alkaline Phosphatase 156 (*)     All other components within normal limits   AMYLASE - Abnormal; Notable for the following:     Amylase 15 (*)     All other components within normal limits     Results for orders placed or performed during the hospital encounter of 05/20/17 (from the past 24 hour(s))   CBC with platelets, differential   Result Value Ref Range    WBC 10.3 4.0 - 11.0 10e9/L    RBC Count 5.24 (H) 3.8 - 5.2 10e12/L    Hemoglobin 15.6 11.7 - 15.7 g/dL    Hematocrit 46.5 35.0 - 47.0 %    MCV 89 78 - 100 fl    MCH 29.8 26.5 - 33.0 pg    MCHC 33.5 31.5 - 36.5 g/dL    RDW 12.9 10.0 - 15.0 %    Platelet Count 382 150 - 450 10e9/L    Diff Method Automated Method     % Neutrophils 59.9 %    % Lymphocytes 29.5 %    % Monocytes 7.8 %    % Eosinophils 2.1 %    % Basophils 0.3 %    % Immature Granulocytes 0.4 %    Absolute Neutrophil 6.2 1.6 - 8.3 10e9/L    Absolute Lymphocytes 3.0 0.8 - 5.3 10e9/L    Absolute Monocytes 0.8 0.0 - 1.3 10e9/L    Absolute Eosinophils 0.2 0.0 - 0.7 10e9/L    Absolute Basophils 0.0 0.0 - 0.2 10e9/L    Abs Immature Granulocytes 0.0 0 - 0.4 10e9/L   Comprehensive metabolic panel   Result Value Ref Range    Sodium 140 133 - 144 mmol/L    Potassium 4.6 3.4 - 5.3 mmol/L    Chloride 107 94 - 109 mmol/L    Carbon Dioxide 21 20 - 32 mmol/L    Anion Gap 12 3 - 14 mmol/L    Glucose 148 (H) 70 - 99 mg/dL    Urea Nitrogen 12 7 - 30 mg/dL    Creatinine 0.71 0.52 - 1.04 mg/dL    GFR Estimate >90  Non  GFR Calc   >60 mL/min/1.7m2    GFR Estimate If Black >90   GFR Calc   >60 mL/min/1.7m2    Calcium 9.4 8.5 - 10.1 mg/dL    Bilirubin Total 0.4 0.2 - 1.3 mg/dL    Albumin 4.0 3.4 - 5.0 g/dL    Protein Total 7.9 6.8 - 8.8 g/dL    Alkaline Phosphatase 156 (H) 40 - 150 U/L    ALT 35 0 - 50 U/L    AST 30 0 - 45 U/L   Amylase   Result Value Ref Range    Amylase 15  (L) 30 - 110 U/L       Medications   0.9% sodium chloride BOLUS (0 mLs Intravenous Stopped 5/20/17 1147)   ondansetron (ZOFRAN) injection 4 mg (4 mg Intravenous Given 5/20/17 1018)       9:23 AM Patient assessed.     Assessments & Plan (with Medical Decision Making)     This patient presented with abdominal cramping pain and frequent diarrhea. Her abdominal exam was basically benign. Labs were unremarkable. Her BP, initially high, normalized. She was treated with IV fluid and IV Zofran. She had no further diarrhea and was taking clear fluids at discharge. Instructions were discussed.      I have reviewed the nursing notes.    I have reviewed the findings, diagnosis, plan and need for follow up with the patient.    Discharge Medication List as of 5/20/2017 12:03 PM          Final diagnoses:   Gastroenteritis     This document serves as a record of the services and decisions personally performed and made by Naveed Elder MD. It was created on HIS/HER behalf by Karie Sabillon, a trained medical scribe. The creation of this document is based the provider's statements to the medical scribe.  Karie Sabillon 9:23 AM 5/20/2017    Provider:   The information in this document, created by the medical scribe for me, accurately reflects the services I personally performed and the decisions made by me. I have reviewed and approved this document for accuracy prior to leaving the patient care area.  Naveed Elder MD 9:23 AM 5/20/2017 5/20/2017   [facility] EMERGENCY DEPARTMENT     Naveed Elder MD  05/20/17 0081

## 2017-05-20 NOTE — MR AVS SNAPSHOT
After Visit Summary   5/20/2017    Mary Dixon    MRN: [id number]           Patient Information     Date Of Birth          1980        Visit Information        Provider Department      5/20/2017 2:15 PM Marychuy Paz APRN CNP [facility]        Today's Diagnoses     BP check    -  1       Follow-ups after your visit        Who to contact     If you have questions or need follow up information about today's clinic visit or your schedule please contact [facility] directly at [phone].  Normal or non-critical lab and imaging results will be communicated to you by S[employer], letter or phone within 4 business days after the clinic has received the results. If you do not hear from us within 7 days, please contact the clinic through Pursway[employer] or phone. If you have a critical or abnormal lab result, we will notify you by phone as soon as possible.  Submit refill requests through [employer] or call your pharmacy and they will forward the refill request to us. Please allow 3 business days for your refill to be completed.          Additional Information About Your Visit        MyChart Information     [employer] gives you secure access to your electronic health record. If you see a primary care provider, you can also send messages to your care team and make appointments. If you have questions, please call your primary care clinic.  If you do not have a primary care provider, please call [phone] and they will assist you.        Care EveryWhere ID     This is your Care EveryWhere ID. This could be used by other organizations to access your [facility] medical records  [id number]         Blood Pressure from Last 3 Encounters:   05/20/17 124/73   05/20/17 124/73   03/31/17 (!) 142/94    Weight from Last 3 Encounters:   05/20/17 (!) 357 lb (161.9 kg)   03/31/17 (!) 353 lb (160.1 kg)   03/27/17 (!) 357 lb (161.9 kg)              Today, you had the following     No orders found  for display       Primary Care Provider Office Phone # Fax #    Aldo Del Valle [phone]       [address] [facility] [address]        Equal Access to Services     KINGA YOUNG : Hadii mary ku bettinao Sotriciaali, waaxda luqadaha, qaybta kaalmada adekamilah, steven greenwoodoctavia ama. So [facility] [phone].    ATENCIÓN: Si habla español, tiene a blanchard disposición servicios gratuitos de asistencia lingüística. Llame al [phone].    We comply with applicable federal civil rights laws and [place] laws. We do not discriminate on the basis of race, color, national origin, age, disability sex, sexual orientation or gender identity.            Thank you!     Thank you for choosing [facility]  for your care. Our goal is always to provide you with excellent care. Hearing back from our patients is one way we can continue to improve our services. Please take a few minutes to complete the written survey that you may receive in the mail after your visit with us. Thank you!             Your Updated Medication List - Protect others around you: Learn how to safely use, store and throw away your medicines at www.disposemymeds.org.          This list is accurate as of: 5/20/17 11:59 PM.  Always use your most recent med list.                   Brand Name Dispense Instructions for use Diagnosis    albuterol 108 (90 BASE) MCG/ACT Inhaler    PROAIR HFA/PROVENTIL HFA/VENTOLIN HFA    1 Inhaler    Inhale 2 puffs into the lungs every 6 hours as needed for shortness of breath / dyspnea or wheezing    Acute bronchitis with coexisting condition requiring prophylactic treatment       fluticasone 50 MCG/ACT spray    FLONASE    16 g    Spray 1-2 sprays into both nostrils daily    Acute sinusitis with coexisting condition requiring prophylactic treatment       lisinopril 10 MG tablet    PRINIVIL/ZESTRIL    30 tablet    Take 1 tablet (10 mg) by mouth daily    Essential hypertension

## 2017-07-22 ENCOUNTER — HEALTH MAINTENANCE LETTER (OUTPATIENT)
Age: 37
End: 2017-07-22

## 2017-09-12 VITALS — SYSTOLIC BLOOD PRESSURE: 124 MMHG | DIASTOLIC BLOOD PRESSURE: 73 MMHG

## 2017-09-12 NOTE — NURSING NOTE
Last normal BP entered  BP Readings from Last 3 Encounters:   05/20/17 124/73   05/20/17 124/73   03/31/17 (!) 142/94

## 2017-12-16 ENCOUNTER — APPOINTMENT (OUTPATIENT)
Dept: GENERAL RADIOLOGY | Facility: CLINIC | Age: 37
End: 2017-12-16
Attending: FAMILY MEDICINE
Payer: COMMERCIAL

## 2017-12-16 ENCOUNTER — HOSPITAL ENCOUNTER (OUTPATIENT)
Facility: CLINIC | Age: 37
Setting detail: OBSERVATION
Discharge: HOME | End: 2017-12-17
Attending: FAMILY MEDICINE | Admitting: FAMILY MEDICINE
Payer: COMMERCIAL

## 2017-12-16 DIAGNOSIS — I10 ESSENTIAL HYPERTENSION: ICD-10-CM

## 2017-12-16 DIAGNOSIS — R73.9 HYPERGLYCEMIA: ICD-10-CM

## 2017-12-16 DIAGNOSIS — R07.89 ATYPICAL CHEST PAIN: ICD-10-CM

## 2017-12-16 LAB
ALBUMIN SERPL-MCNC: 3.7 G/DL (ref 3.4–5)
ALP SERPL-CCNC: 165 U/L (ref 40–150)
ALT SERPL W P-5'-P-CCNC: 33 U/L (ref 0–50)
ANION GAP SERPL CALCULATED.3IONS-SCNC: 7 MMOL/L (ref 3–14)
AST SERPL W P-5'-P-CCNC: 18 U/L (ref 0–45)
BASOPHILS # BLD AUTO: 0.1 10E9/L (ref 0–0.2)
BASOPHILS NFR BLD AUTO: 0.5 %
BILIRUB SERPL-MCNC: 0.3 MG/DL (ref 0.2–1.3)
BUN SERPL-MCNC: 14 MG/DL (ref 7–30)
CALCIUM SERPL-MCNC: 8.9 MG/DL (ref 8.5–10.1)
CHLORIDE SERPL-SCNC: 105 MMOL/L (ref 94–109)
CO2 SERPL-SCNC: 27 MMOL/L (ref 20–32)
CREAT SERPL-MCNC: 0.65 MG/DL (ref 0.52–1.04)
D DIMER PPP FEU-MCNC: 0.3 UG/ML FEU (ref 0–0.5)
DIFFERENTIAL METHOD BLD: NORMAL
EOSINOPHIL # BLD AUTO: 0.2 10E9/L (ref 0–0.7)
EOSINOPHIL NFR BLD AUTO: 2 %
ERYTHROCYTE [DISTWIDTH] IN BLOOD BY AUTOMATED COUNT: 12.7 % (ref 10–15)
GFR SERPL CREATININE-BSD FRML MDRD: >90 ML/MIN/1.7M2
GLUCOSE SERPL-MCNC: 180 MG/DL (ref 70–99)
HCT VFR BLD AUTO: 42.5 % (ref 35–47)
HGB BLD-MCNC: 14.4 G/DL (ref 11.7–15.7)
IMM GRANULOCYTES # BLD: 0 10E9/L (ref 0–0.4)
IMM GRANULOCYTES NFR BLD: 0.3 %
INR PPP: 0.85 (ref 0.86–1.14)
LIPASE SERPL-CCNC: 155 U/L (ref 73–393)
LYMPHOCYTES # BLD AUTO: 3 10E9/L (ref 0.8–5.3)
LYMPHOCYTES NFR BLD AUTO: 32.2 %
MCH RBC QN AUTO: 30.3 PG (ref 26.5–33)
MCHC RBC AUTO-ENTMCNC: 33.9 G/DL (ref 31.5–36.5)
MCV RBC AUTO: 90 FL (ref 78–100)
MONOCYTES # BLD AUTO: 0.7 10E9/L (ref 0–1.3)
MONOCYTES NFR BLD AUTO: 7.2 %
NEUTROPHILS # BLD AUTO: 5.4 10E9/L (ref 1.6–8.3)
NEUTROPHILS NFR BLD AUTO: 57.8 %
NRBC # BLD AUTO: 0 10*3/UL
NRBC BLD AUTO-RTO: 0 /100
PLATELET # BLD AUTO: 330 10E9/L (ref 150–450)
POTASSIUM SERPL-SCNC: 3.8 MMOL/L (ref 3.4–5.3)
PROT SERPL-MCNC: 7.9 G/DL (ref 6.8–8.8)
RBC # BLD AUTO: 4.75 10E12/L (ref 3.8–5.2)
SODIUM SERPL-SCNC: 139 MMOL/L (ref 133–144)
TROPONIN I BLD-MCNC: 0 UG/L (ref 0–0.1)
TROPONIN I SERPL-MCNC: <0.015 UG/L (ref 0–0.04)
WBC # BLD AUTO: 9.3 10E9/L (ref 4–11)

## 2017-12-16 PROCEDURE — 99220 ZZC INITIAL OBSERVATION CARE,LEVL III: CPT | Mod: 25 | Performed by: NURSE PRACTITIONER

## 2017-12-16 PROCEDURE — G0378 HOSPITAL OBSERVATION PER HR: HCPCS

## 2017-12-16 PROCEDURE — 99285 EMERGENCY DEPT VISIT HI MDM: CPT | Performed by: FAMILY MEDICINE

## 2017-12-16 PROCEDURE — 25000132 ZZH RX MED GY IP 250 OP 250 PS 637: Performed by: FAMILY MEDICINE

## 2017-12-16 PROCEDURE — 93005 ELECTROCARDIOGRAM TRACING: CPT | Performed by: FAMILY MEDICINE

## 2017-12-16 PROCEDURE — 84484 ASSAY OF TROPONIN QUANT: CPT | Performed by: NURSE PRACTITIONER

## 2017-12-16 PROCEDURE — 93005 ELECTROCARDIOGRAM TRACING: CPT

## 2017-12-16 PROCEDURE — 71020 XR CHEST 2 VW: CPT

## 2017-12-16 PROCEDURE — 25000128 H RX IP 250 OP 636: Performed by: NURSE PRACTITIONER

## 2017-12-16 PROCEDURE — 93010 ELECTROCARDIOGRAM REPORT: CPT | Performed by: INTERNAL MEDICINE

## 2017-12-16 PROCEDURE — 36415 COLL VENOUS BLD VENIPUNCTURE: CPT | Performed by: NURSE PRACTITIONER

## 2017-12-16 PROCEDURE — 25000132 ZZH RX MED GY IP 250 OP 250 PS 637: Performed by: NURSE PRACTITIONER

## 2017-12-16 PROCEDURE — 80053 COMPREHEN METABOLIC PANEL: CPT | Performed by: FAMILY MEDICINE

## 2017-12-16 PROCEDURE — 85379 FIBRIN DEGRADATION QUANT: CPT | Performed by: FAMILY MEDICINE

## 2017-12-16 PROCEDURE — 83690 ASSAY OF LIPASE: CPT | Performed by: FAMILY MEDICINE

## 2017-12-16 PROCEDURE — 85025 COMPLETE CBC W/AUTO DIFF WBC: CPT | Performed by: FAMILY MEDICINE

## 2017-12-16 PROCEDURE — 25000125 ZZHC RX 250: Performed by: FAMILY MEDICINE

## 2017-12-16 PROCEDURE — 84484 ASSAY OF TROPONIN QUANT: CPT | Performed by: FAMILY MEDICINE

## 2017-12-16 PROCEDURE — 84484 ASSAY OF TROPONIN QUANT: CPT

## 2017-12-16 PROCEDURE — 93010 ELECTROCARDIOGRAM REPORT: CPT | Mod: Z6 | Performed by: FAMILY MEDICINE

## 2017-12-16 PROCEDURE — 85610 PROTHROMBIN TIME: CPT | Performed by: FAMILY MEDICINE

## 2017-12-16 RX ORDER — ASPIRIN 81 MG/1
81 TABLET ORAL DAILY
Status: DISCONTINUED | OUTPATIENT
Start: 2017-12-17 | End: 2017-12-17 | Stop reason: HOSPADM

## 2017-12-16 RX ORDER — ACETAMINOPHEN 325 MG/1
650 TABLET ORAL EVERY 4 HOURS PRN
Status: DISCONTINUED | OUTPATIENT
Start: 2017-12-16 | End: 2017-12-17 | Stop reason: HOSPADM

## 2017-12-16 RX ORDER — NITROGLYCERIN 0.4 MG/1
0.4 TABLET SUBLINGUAL EVERY 5 MIN PRN
Status: DISCONTINUED | OUTPATIENT
Start: 2017-12-16 | End: 2017-12-17 | Stop reason: HOSPADM

## 2017-12-16 RX ORDER — LIDOCAINE 40 MG/G
CREAM TOPICAL
Status: DISCONTINUED | OUTPATIENT
Start: 2017-12-16 | End: 2017-12-17 | Stop reason: HOSPADM

## 2017-12-16 RX ORDER — ALUMINA, MAGNESIA, AND SIMETHICONE 2400; 2400; 240 MG/30ML; MG/30ML; MG/30ML
30 SUSPENSION ORAL EVERY 4 HOURS PRN
Status: DISCONTINUED | OUTPATIENT
Start: 2017-12-16 | End: 2017-12-17 | Stop reason: HOSPADM

## 2017-12-16 RX ORDER — NALOXONE HYDROCHLORIDE 0.4 MG/ML
.1-.4 INJECTION, SOLUTION INTRAMUSCULAR; INTRAVENOUS; SUBCUTANEOUS
Status: DISCONTINUED | OUTPATIENT
Start: 2017-12-16 | End: 2017-12-17 | Stop reason: HOSPADM

## 2017-12-16 RX ORDER — LISINOPRIL 10 MG/1
10 TABLET ORAL DAILY
Status: DISCONTINUED | OUTPATIENT
Start: 2017-12-16 | End: 2017-12-17 | Stop reason: HOSPADM

## 2017-12-16 RX ADMIN — LISINOPRIL 10 MG: 10 TABLET ORAL at 17:31

## 2017-12-16 RX ADMIN — NITROGLYCERIN 0.4 MG: 0.4 TABLET SUBLINGUAL at 17:31

## 2017-12-16 RX ADMIN — RANITIDINE 150 MG: 150 TABLET ORAL at 19:26

## 2017-12-16 RX ADMIN — ACETAMINOPHEN 650 MG: 325 TABLET, FILM COATED ORAL at 17:31

## 2017-12-16 RX ADMIN — LIDOCAINE HYDROCHLORIDE 30 ML: 20 SOLUTION ORAL; TOPICAL at 11:24

## 2017-12-16 RX ADMIN — NITROGLYCERIN 0.4 MG: 0.4 TABLET SUBLINGUAL at 17:44

## 2017-12-16 RX ADMIN — SODIUM CHLORIDE 1000 ML: 9 INJECTION, SOLUTION INTRAVENOUS at 19:20

## 2017-12-16 ASSESSMENT — ACTIVITIES OF DAILY LIVING (ADL)
COGNITION: 0 - NO COGNITION ISSUES REPORTED
DRESS: 0-->INDEPENDENT
BATHING: 0-->INDEPENDENT
FALL_HISTORY_WITHIN_LAST_SIX_MONTHS: NO
AMBULATION: 0-->INDEPENDENT
TRANSFERRING: 0-->INDEPENDENT
TOILETING: 0-->INDEPENDENT
RETIRED_COMMUNICATION: 0-->UNDERSTANDS/COMMUNICATES WITHOUT DIFFICULTY
RETIRED_EATING: 0-->INDEPENDENT
SWALLOWING: 0-->SWALLOWS FOODS/LIQUIDS WITHOUT DIFFICULTY

## 2017-12-16 ASSESSMENT — ENCOUNTER SYMPTOMS
FEVER: 0
SHORTNESS OF BREATH: 0
ABDOMINAL PAIN: 0

## 2017-12-16 NOTE — PHARMACY-ADMISSION MEDICATION HISTORY
Admission Medication History status for the 12/16/2017 admission is complete.  See EPIC admission navigator for Prior to Admission medications.    Medication history sources:  Patient, [facility], [facility]     Medication history source reliability: Moderate    Medication adherence:  Poor    Changes made to PTA medication list (reason)  Added: None  Deleted:   Albuterol 108 mcg/act inhaler  Fluticasone 50mcg/act nasal spray  Changed: None    Additional medication history information (including reliability of information, actions taken by pharmacist):   - Patient was a fine historian, she denied taking any medication, then later stated she occasionally takes her lisinopril  - Patient denied all other medications from [facility], OTC medications, supplements or topical medications  - Patient has not gotten a flu shot this year    Time spent in this activity: 15 min    Medication history completed by: Ami Ta      Prior to Admission medications    Medication Sig Last Dose Taking? Auth Provider   lisinopril (PRINIVIL/ZESTRIL) 10 MG tablet Take 1 tablet (10 mg) by mouth daily Past Week at Unknown time Yes Dyana Hendrickson APRN CNP

## 2017-12-16 NOTE — H&P
"ED OBSERVATION HISTORY & PHYSICAL    Admission Date: 12/16/2017  Attending Physician: Dr. Emmanuel MD  NP/PA: Medina Jose CNP    REASON FOR ADMISSION:   Chief Complaint   Patient presents with     Shortness of Breath       HPI:    Mary Dixon is a 37 year old female with a history of hypertension who presented to the emergency room with left arm achiness radiating into her left side of her jaw along with shortness of breath. 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 She has been wearing a mask at work as she does not wish to receive the flu shot. Last night she couldn't wear the mask due to SOB. She also notes several people in her life who have experienced heart attacks in the past few months. She denies known family history of early cardiac disease or death. She states her paternal grandfather at a possible event in his 30's but lived until his 80's.  Patient does have significant past medical history for ovarian cancer (status post removal of her ovaries).  She has a history of borderline hypertension and borderline diabetes but is not currently on medications for the diabetes.       In the ED her work-up revealed normal VS, negative troponin x 2, negative EKG. Her chest x-ray was clear. She is admitted to observation for ACS rule out.     On admission to the observation unit the patient was stable. She notes a sensation of a \"lump\" in her throat. No overt chest pain. Offered option of GI cocktail, observe, versus trial of nitro. She would like to try nitro. Does not feel like typical reflux symptoms.     ROS:    CONSTITUTIONAL: Generally feels well. Denies fever, chills  SKIN: Denies aguila  EYES: Denies visual changes  EARS/NOSE/THROAT: Denies  sinus pressure/drainage, PND, nasal congestion  RESPIRATORY: Denies dyspnea at rest or with activity, cough, or hemoptysis.  CARDIOVASCULAR: Denies " palpitations, chest pain/pressure, edema or open areas on extremities.  GASTROINTESTINAL: Good appetite and PO intake. Denies dysphagia, heartburn, nausea, vomiting, abdominal pain, constipation, or diarrhea.  GENITOURINARY: Denies urinary frequency  MUSCULOSKELTAL: Denies muscle/joint pain and weakness  NEUROLOGIC: Denies headaches, dizziness, numbness or tingling of hands and feet, confusion, memory changes, lightheadedness/dizziness or difficulties with balance.  PSYCHIATRIC: Denies  change in mood.  HEME/LYMPH: Denies active bleeding, swollen nodes  VASCULAR ACCESS: Denies pain, redness, or discharge.    ROS negative other than the symptoms noted above.    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     Social History Main Topics     Smoking status: Current Every Day Smoker     Packs/day: 0.50     Years: 2.00     Types: Cigarettes     Smokeless tobacco: Current User     Alcohol use No     Drug use: No     Sexual activity: No     Other  "Topics Concern     Parent/Sibling W/ Cabg, Mi Or Angioplasty Before 65f 55m? No     Social History Narrative         No current facility-administered medications on file prior to encounter.   Current Outpatient Prescriptions on File Prior to Encounter:  lisinopril (PRINIVIL/ZESTRIL) 10 MG tablet Take 1 tablet (10 mg) by mouth daily       Exam:  Vital signs:  Temp: 97.9  F (36.6  C) Temp src: Oral BP: (!) 154/96 Pulse: 88 Heart Rate: 89 Resp: 16 SpO2: 99 % O2 Device: None (Room air)        Estimated body mass index is 52.72 kg/(m^2) as calculated from the following:    Height as of 5/20/17: 1.753 m (5' 9\").    Weight as of 5/20/17: 161.9 kg (357 lb).    All vital signs were reviewed.  GENERAL APPEARENCE:  A/O x4. NAD.  SKIN: Clean, dry, and intact  HEENT: NCAT w/out masses, lesions, or abnormalities. Sclera anicteric, PERRLA, EOMI.  Oral mucosa pink and moist without erythema, exudate, lesions, ulcerations, or thrush. Teeth and gums normal.    NECK: Supple, no masses. No jugular venous distention.   CARDIOVASCULAR: S1, S2 RRR. No murmurs, rubs, or gallops.   RESPIRATORY: Respiratory effort WNL. CTA  bilaterally without crackles/rales/wheeze   GI: Active BS in all 4 quadrants. Abdomen soft and non-tender. No masses or hepatosplenomegaly.  : Deferred  MUSCULOSKELETAL: Extremities normal, no gross deformities noted, non-tender to palpation.   PV: 2+ bilateral radial and pedal pulses. No edema noted.   NEURO: CN II-XII grossly intact. Speech normal. Appropriate throughout interview.   HEME/LYMPH: No visible bleeding.  PSYCHIATRIC: Mentation and affect appear normal  VASCULAR ACCESS: CDI without erythema or discharge. Non-tender.    Data:    Results for orders placed or performed during the hospital encounter of 12/16/17   Chest XR,  PA & LAT    Narrative    XR CHEST 2 VW   12/16/2017 10:33 AM     HISTORY: Shortness of breath.    COMPARISON: 2/24/2009.    FINDINGS: Heart and lungs negative. No interval change.      " Impression    IMPRESSION: Negative.    ANGEL PIERSON MD   Comprehensive metabolic panel   Result Value Ref Range    Sodium 139 133 - 144 mmol/L    Potassium 3.8 3.4 - 5.3 mmol/L    Chloride 105 94 - 109 mmol/L    Carbon Dioxide 27 20 - 32 mmol/L    Anion Gap 7 3 - 14 mmol/L    Glucose 180 (H) 70 - 99 mg/dL    Urea Nitrogen 14 7 - 30 mg/dL    Creatinine 0.65 0.52 - 1.04 mg/dL    GFR Estimate >90 >60 mL/min/1.7m2    GFR Estimate If Black >90 >60 mL/min/1.7m2    Calcium 8.9 8.5 - 10.1 mg/dL    Bilirubin Total 0.3 0.2 - 1.3 mg/dL    Albumin 3.7 3.4 - 5.0 g/dL    Protein Total 7.9 6.8 - 8.8 g/dL    Alkaline Phosphatase 165 (H) 40 - 150 U/L    ALT 33 0 - 50 U/L    AST 18 0 - 45 U/L   INR   Result Value Ref Range    INR 0.85 (L) 0.86 - 1.14   Troponin I   Result Value Ref Range    Troponin I ES <0.015 0.000 - 0.045 ug/L   D dimer quantitative   Result Value Ref Range    D Dimer 0.3 0.0 - 0.50 ug/ml FEU   CBC with platelets differential   Result Value Ref Range    WBC 9.3 4.0 - 11.0 10e9/L    RBC Count 4.75 3.8 - 5.2 10e12/L    Hemoglobin 14.4 11.7 - 15.7 g/dL    Hematocrit 42.5 35.0 - 47.0 %    MCV 90 78 - 100 fl    MCH 30.3 26.5 - 33.0 pg    MCHC 33.9 31.5 - 36.5 g/dL    RDW 12.7 10.0 - 15.0 %    Platelet Count 330 150 - 450 10e9/L    Diff Method Automated Method     % Neutrophils 57.8 %    % Lymphocytes 32.2 %    % Monocytes 7.2 %    % Eosinophils 2.0 %    % Basophils 0.5 %    % Immature Granulocytes 0.3 %    Nucleated RBCs 0 0 /100    Absolute Neutrophil 5.4 1.6 - 8.3 10e9/L    Absolute Lymphocytes 3.0 0.8 - 5.3 10e9/L    Absolute Monocytes 0.7 0.0 - 1.3 10e9/L    Absolute Eosinophils 0.2 0.0 - 0.7 10e9/L    Absolute Basophils 0.1 0.0 - 0.2 10e9/L    Abs Immature Granulocytes 0.0 0 - 0.4 10e9/L    Absolute Nucleated RBC 0.0    Lipase   Result Value Ref Range    Lipase 155 73 - 393 U/L   Troponin I   Result Value Ref Range    Troponin I ES <0.015 0.000 - 0.045 ug/L   EKG 12 lead   Result Value Ref Range     "Interpretation ECG Click View Image link to view waveform and result    EKG 12-lead, complete   Result Value Ref Range    Interpretation ECG Click View Image link to view waveform and result    Troponin POCT   Result Value Ref Range    Troponin I 0.00 0.00 - 0.10 ug/L             EKG Interpretation:      Interpreted by Paul Birch  Time reviewed: 0840  Symptoms at time of EKG: Shortness of breath  Rhythm: normal sinus   Rate: normal  Axis: normal  Ectopy: none  Conduction: normal  ST Segments/ T Waves: No ST-T wave changes  Q Waves: none  Comparison to prior: Unchanged   Clinical Impression: normal EKG      Assessment/Plan:  Mary Dixon is a 37 year old female with a history of hypertension who presented to the emergency room with  left arm achiness radiating into her left side of her jaw along with shortness of breath.     1. Chest Pain: No chest pain here, but with sensation of lump in her throat. History of hypertension and obesity. Troponin negative x2, EKG unremarkable x 2. Chest x-ray is clear.   -Kasson to observation  -TELE  -Trend troponin x 2  -Exercise Stress Test in the morning  -ASA  -EKG PRN chest pain   -Zantac 150 mg's BID    2. Hyperglycemia: . Does not follow with a PCP regularly.   -Check hgb A1C      FEN:  -Regular diet as tolerated, no caffeine, CLD after midnight   -Monitor BMP     Prophy:  -No VTE prophy as patient is up ad bonifacio and anticipate short observation stay   -Encourage ambulation as tolerated       CODE STATUS:  FULL CODE       DISPOSITION: Pending cardiac work-up.       LYLE Larsen, CNP  Nurse Practitioner   Emergency Department Observation Unit        Addendum 1800: Sensation of \"lump\" in throat improved with 2 nitro. Mild headache. Would like IVF.    LYLE Larsen, CNP  Emergency Department Observation Unit        Patient was seen and evaluated by ER Staff during the OBS Unit stay (see separate note).  The medical decision making and plan of " care was discussed with the OBS Care Team and was supervised by ER Staff.  The documentation above accurately reflects the patient's initial evaluation, care and disposition under my supervision in the OBS Unit.    Steven Benitez MD, FACEP  [facility] Staff Emergency Physician

## 2017-12-16 NOTE — IP AVS SNAPSHOT
MRN:[id number]                      After Visit Summary   12/16/2017    Mary Dixon    MRN: [id number]           Thank you!     Thank you for choosing [facility] for your care. Our goal is always to provide you with excellent care. Hearing back from our patients is one way we can continue to improve our services. Please take a few minutes to complete the written survey that you may receive in the mail after you visit with us. Thank you!        Patient Information     Date Of Birth          1980        Designated Caregiver       Most Recent Value    Caregiver    Will someone help with your care after discharge? no      About your hospital stay     You were admitted on:  December 16, 2017 You last received care in the:  Unit 6D Observation [facility]    You were discharged on:  December 17, 2017        Reason for your hospital stay       You were admitted to the observation unit for evaluation of your chest pain.  Your EKG was normal, labs checking for heart damage were negative, chest x-ray was normal.  You underwent a stress test and this was normal. You did not achieve target heart rate, but you were very close. Given your otherwise normal work-up we can safely discharge you to home.                  Who to Call     For medical emergencies, please call 911.  For non-urgent questions about your medical care, please call your primary care provider or clinic, [phone]          Attending Provider     Provider Specialty    Paul Birch MD Emergency Medicine    Steven Benitez MD Emergency Medicine       Primary Care Provider Office Phone # Fax #    Aldo Del Valle [phone]       When to contact your care team       Return to the ED with fever, uncontrolled nausea, vomiting, unrelieved pain, bleeding not relieved with pressure, dizziness, chest pain, shortness of breath, loss of consciousness, and any new or concerning symptoms.      Please go to  your nearest emergency room If you were to have a change in the type of chest pain i.e more severe, lasting longer or radiating to your shoulder, arm, neck, jaw or back, shortness of breath or increased pain with breathing, coughing up blood, feel dizzy or lightheaded, or notice swelling in one leg.                  After Care Instructions     Activity       Your activity upon discharge: activity as tolerated            Diet       Follow this diet upon discharge:     Regular Diet Adult  Be sure to drink plenty of non-caffeinated beverages.                  Follow-up Appointments     Adult [facility] Follow-up and recommended labs and tests       Follow up with primary care provider, Aldo Del Valle, within 2 days for hospital follow- up.  You hgb A1C is 8.0, indicating Diabetes, I recommend you discussed with your primary care MD and get on medications for this.     Appointments on [place] and/or [facility] (with [facility] or [facility] provider or service). Call [phone] if you haven't heard regarding these appointments within 7 days of discharge.                  Pending Results     Date and Time Order Name Status Description    12/16/2017 1655 EKG 12-lead, complete Preliminary     12/16/2017 0836 EKG 12 lead Preliminary             Statement of Approval     Ordered          12/17/17 1236  I have reviewed and agree with all the recommendations and orders detailed in this document.  EFFECTIVE NOW     Approved and electronically signed by:  Medina Jose APRN CNP             Admission Information     Date & Time Provider Department Dept. Phone    12/16/2017 Steven Benitez MD Unit 6D Observation [facility] [place] [phone]      Your Vitals Were     Blood Pressure Pulse Temperature Respirations Pulse Oximetry       119/70 (BP Location: Right arm) 81 97.7  F (36.5  C) (Oral) 16 95%       MyChart Information     MyChart gives you secure access to your electronic health record. If you see a primary care  provider, you can also send messages to your care team and make appointments. If you have questions, please call your primary care clinic.  If you do not have a primary care provider, please call [phone] and they will assist you.        Care EveryWhere ID     This is your Care EveryWhere ID. This could be used by other organizations to access your [facility] medical records  [id number]        Equal Access to Services     KINGA YOUNG : Hadii aad ku hadasho Soomaali, waaxda luqadaha, qaybta kaalmada adeegyada, waxay blazein hayaan adeangely sandhuopheliajordyn lanitin serrato. So [facility] [phone].    ATENCIÓN: Si habla español, tiene a blanchard disposición servicios gratuitos de asistencia lingüística. Mary al [phone].    We comply with applicable federal civil rights laws and [place] laws. We do not discriminate on the basis of race, color, national origin, age, disability, sex, sexual orientation, or gender identity.               Review of your medicines      START taking        Dose / Directions    ranitidine 150 MG tablet   Commonly known as:  ZANTAC   Used for:  Atypical chest pain        Dose:  150 mg   Take 1 tablet (150 mg) by mouth 2 times daily   Quantity:  60 tablet   Refills:  0         CONTINUE these medicines which have NOT CHANGED        Dose / Directions    lisinopril 10 MG tablet   Commonly known as:  PRINIVIL/ZESTRIL   Used for:  Essential hypertension        Dose:  10 mg   Take 1 tablet (10 mg) by mouth daily   Quantity:  14 tablet   Refills:  0            Where to get your medicines      These medications were sent to [facility] [address] [place] [address] AT [address] [address]     Phone:  [phone]     lisinopril 10 MG tablet    ranitidine 150 MG tablet                Protect others around you: Learn how to safely use, store and throw away your medicines at www.disposemymeds.org.             Medication List: This is a list of  all your medications and when to take them. Check marks below indicate your daily home schedule. Keep this list as a reference.      Medications           Morning Afternoon Evening Bedtime As Needed    lisinopril 10 MG tablet   Commonly known as:  PRINIVIL/ZESTRIL   Take 1 tablet (10 mg) by mouth daily   Last time this was given:  10 mg on 12/17/2017  7:44 AM                                ranitidine 150 MG tablet   Commonly known as:  ZANTAC   Take 1 tablet (150 mg) by mouth 2 times daily   Last time this was given:  150 mg on 12/17/2017  7:44 AM

## 2017-12-16 NOTE — ED PROVIDER NOTES
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  Patient does have significant past medical history significant for ovarian cancer is status post removal of her ovaries patient states that she has not had any cardiac disease but has not been following up with her primary MD on any sort of regular basis.  She does have a history of borderline hypertension and borderline diabetes but is not currently on medications for the diabetes she also has a history of mild asthma.    I have reviewed the Medications, Allergies, Past Medical and Surgical History, and Social History in the Epic system.    Review of Systems   Constitutional: Negative for fever.   Respiratory: Negative for shortness of breath.    Cardiovascular: Negative for chest pain.   Gastrointestinal: Negative for abdominal pain.   All other systems reviewed and are negative.      Physical Exam   BP: (!) 149/93  Pulse: 79  Heart Rate: 104  Temp: 97.9  F (36.6  C)  Resp: 16  SpO2: 99 %      Physical Exam   Constitutional: She is oriented to person, place, and time. No distress.   HENT:   Head: Atraumatic.   Mouth/Throat: Oropharynx is clear and moist. No oropharyngeal exudate.   Eyes: Pupils are equal, round, and reactive to light. No scleral icterus.   Cardiovascular: Normal heart sounds and intact distal pulses.    Pulmonary/Chest: Breath sounds normal. No respiratory distress.   Abdominal: Soft. Bowel sounds are normal. There is no tenderness.   Musculoskeletal: She exhibits no edema or tenderness.   Neurological: She is alert and oriented to person, place, and time. No cranial nerve  deficit. She exhibits normal muscle tone. Coordination normal.   Skin: Skin is warm. No rash noted. She is not diaphoretic.       ED Course     ED Course     Procedures         EKG Interpretation:      Interpreted by Paul Birch  Time reviewed: 0840  Symptoms at time of EKG: Shortness of breath  Rhythm: normal sinus   Rate: normal  Axis: normal  Ectopy: none  Conduction: normal  ST Segments/ T Waves: No ST-T wave changes  Q Waves: none  Comparison to prior: Unchanged    Clinical Impression: normal EKG            Critical Care time:  none     Labs/Imaging:    Results for orders placed or performed during the hospital encounter of 12/16/17 (from the past 24 hour(s))   Troponin POCT   Result Value Ref Range    Troponin I 0.00 0.00 - 0.10 ug/L   Comprehensive metabolic panel   Result Value Ref Range    Sodium 139 133 - 144 mmol/L    Potassium 3.8 3.4 - 5.3 mmol/L    Chloride 105 94 - 109 mmol/L    Carbon Dioxide 27 20 - 32 mmol/L    Anion Gap 7 3 - 14 mmol/L    Glucose 180 (H) 70 - 99 mg/dL    Urea Nitrogen 14 7 - 30 mg/dL    Creatinine 0.65 0.52 - 1.04 mg/dL    GFR Estimate >90 >60 mL/min/1.7m2    GFR Estimate If Black >90 >60 mL/min/1.7m2    Calcium 8.9 8.5 - 10.1 mg/dL    Bilirubin Total 0.3 0.2 - 1.3 mg/dL    Albumin 3.7 3.4 - 5.0 g/dL    Protein Total 7.9 6.8 - 8.8 g/dL    Alkaline Phosphatase 165 (H) 40 - 150 U/L    ALT 33 0 - 50 U/L    AST 18 0 - 45 U/L   INR   Result Value Ref Range    INR 0.85 (L) 0.86 - 1.14   Troponin I   Result Value Ref Range    Troponin I ES <0.015 0.000 - 0.045 ug/L   D dimer quantitative   Result Value Ref Range    D Dimer 0.3 0.0 - 0.50 ug/ml FEU   Lipase   Result Value Ref Range    Lipase 155 73 - 393 U/L   CBC with platelets differential   Result Value Ref Range    WBC 9.3 4.0 - 11.0 10e9/L    RBC Count 4.75 3.8 - 5.2 10e12/L    Hemoglobin 14.4 11.7 - 15.7 g/dL    Hematocrit 42.5 35.0 - 47.0 %    MCV 90 78 - 100 fl    MCH 30.3 26.5 - 33.0 pg    MCHC 33.9 31.5 - 36.5 g/dL     RDW 12.7 10.0 - 15.0 %    Platelet Count 330 150 - 450 10e9/L    Diff Method Automated Method     % Neutrophils 57.8 %    % Lymphocytes 32.2 %    % Monocytes 7.2 %    % Eosinophils 2.0 %    % Basophils 0.5 %    % Immature Granulocytes 0.3 %    Nucleated RBCs 0 0 /100    Absolute Neutrophil 5.4 1.6 - 8.3 10e9/L    Absolute Lymphocytes 3.0 0.8 - 5.3 10e9/L    Absolute Monocytes 0.7 0.0 - 1.3 10e9/L    Absolute Eosinophils 0.2 0.0 - 0.7 10e9/L    Absolute Basophils 0.1 0.0 - 0.2 10e9/L    Abs Immature Granulocytes 0.0 0 - 0.4 10e9/L    Absolute Nucleated RBC 0.0    Chest XR,  PA & LAT    Narrative    XR CHEST 2 VW   12/16/2017 10:33 AM     HISTORY: Shortness of breath.    COMPARISON: 2/24/2009.    FINDINGS: Heart and lungs negative. No interval change.      Impression    IMPRESSION: Negative.    ANGEL PIERSON MD   Troponin I   Result Value Ref Range    Troponin I ES <0.015 0.000 - 0.045 ug/L                Assessments & Plan (with Medical Decision Making)       I have reviewed the nursing notes.    I have reviewed the findings, diagnosis, plan and need for follow up with the patient.  Patient with atypical chest pain shortness of breath rule out possible cardiac origin at this time initial workup is negative including negative troponins negative d-dimer normal chest x-ray however in light of the fact the patient has had similar episodes on at least 2 occasions we discussed the possibility of chest pain observation and patient agreed to admission to station 6D for chest pain observation and stress test in the morning.    New Prescriptions    No medications on file       Final diagnoses:   Atypical chest pain       12/16/2017   [facility], [place], EMERGENCY DEPARTMENT     Paul Birch MD  12/16/17 0200

## 2017-12-16 NOTE — IP AVS SNAPSHOT
Unit 6D Observation [address] [address]    Phone:  [phone]    Fax:  [phone]                                       After Visit Summary   12/16/2017    Mary Dixon    MRN: [id number]           After Visit Summary Signature Page     I have received my discharge instructions, and my questions have been answered. I have discussed any challenges I see with this plan with the nurse or doctor.    ..........................................................................................................................................  Patient/Patient Representative Signature      ..........................................................................................................................................  Patient Representative Print Name and Relationship to Patient    ..................................................               ................................................  Date                                            Time    ..........................................................................................................................................  Reviewed by Signature/Title    ...................................................              ..............................................  Date                                                            Time

## 2017-12-17 ENCOUNTER — APPOINTMENT (OUTPATIENT)
Dept: CARDIOLOGY | Facility: CLINIC | Age: 37
End: 2017-12-17
Attending: NURSE PRACTITIONER
Payer: COMMERCIAL

## 2017-12-17 VITALS
SYSTOLIC BLOOD PRESSURE: 119 MMHG | DIASTOLIC BLOOD PRESSURE: 70 MMHG | TEMPERATURE: 97.7 F | RESPIRATION RATE: 16 BRPM | HEART RATE: 81 BPM | OXYGEN SATURATION: 95 %

## 2017-12-17 LAB
ALBUMIN SERPL-MCNC: 3.2 G/DL (ref 3.4–5)
ALP SERPL-CCNC: 114 U/L (ref 40–150)
ALT SERPL W P-5'-P-CCNC: 30 U/L (ref 0–50)
ANION GAP SERPL CALCULATED.3IONS-SCNC: 5 MMOL/L (ref 3–14)
AST SERPL W P-5'-P-CCNC: 12 U/L (ref 0–45)
BASOPHILS # BLD AUTO: 0 10E9/L (ref 0–0.2)
BASOPHILS NFR BLD AUTO: 0.5 %
BILIRUB DIRECT SERPL-MCNC: <0.1 MG/DL (ref 0–0.2)
BILIRUB SERPL-MCNC: 0.5 MG/DL (ref 0.2–1.3)
BUN SERPL-MCNC: 12 MG/DL (ref 7–30)
CALCIUM SERPL-MCNC: 8.7 MG/DL (ref 8.5–10.1)
CHLORIDE SERPL-SCNC: 108 MMOL/L (ref 94–109)
CO2 SERPL-SCNC: 27 MMOL/L (ref 20–32)
CREAT SERPL-MCNC: 0.64 MG/DL (ref 0.52–1.04)
DIFFERENTIAL METHOD BLD: NORMAL
EOSINOPHIL # BLD AUTO: 0.2 10E9/L (ref 0–0.7)
EOSINOPHIL NFR BLD AUTO: 2.3 %
ERYTHROCYTE [DISTWIDTH] IN BLOOD BY AUTOMATED COUNT: 13.3 % (ref 10–15)
GFR SERPL CREATININE-BSD FRML MDRD: >90 ML/MIN/1.7M2
GLUCOSE SERPL-MCNC: 142 MG/DL (ref 70–99)
HBA1C MFR BLD: 8.2 % (ref 4.3–6)
HCT VFR BLD AUTO: 44.7 % (ref 35–47)
HGB BLD-MCNC: 15.2 G/DL (ref 11.7–15.7)
IMM GRANULOCYTES # BLD: 0 10E9/L (ref 0–0.4)
IMM GRANULOCYTES NFR BLD: 0.2 %
LYMPHOCYTES # BLD AUTO: 3.5 10E9/L (ref 0.8–5.3)
LYMPHOCYTES NFR BLD AUTO: 39.6 %
MCH RBC QN AUTO: 29.9 PG (ref 26.5–33)
MCHC RBC AUTO-ENTMCNC: 34 G/DL (ref 31.5–36.5)
MCV RBC AUTO: 88 FL (ref 78–100)
MONOCYTES # BLD AUTO: 0.7 10E9/L (ref 0–1.3)
MONOCYTES NFR BLD AUTO: 8 %
NEUTROPHILS # BLD AUTO: 4.3 10E9/L (ref 1.6–8.3)
NEUTROPHILS NFR BLD AUTO: 49.4 %
NRBC # BLD AUTO: 0 10*3/UL
NRBC BLD AUTO-RTO: 0 /100
PLATELET # BLD AUTO: 308 10E9/L (ref 150–450)
POTASSIUM SERPL-SCNC: 4 MMOL/L (ref 3.4–5.3)
PROT SERPL-MCNC: 7 G/DL (ref 6.8–8.8)
RBC # BLD AUTO: 5.08 10E12/L (ref 3.8–5.2)
SODIUM SERPL-SCNC: 141 MMOL/L (ref 133–144)
WBC # BLD AUTO: 8.7 10E9/L (ref 4–11)

## 2017-12-17 PROCEDURE — 93321 DOPPLER ECHO F-UP/LMTD STD: CPT | Mod: TC

## 2017-12-17 PROCEDURE — 83036 HEMOGLOBIN GLYCOSYLATED A1C: CPT | Performed by: NURSE PRACTITIONER

## 2017-12-17 PROCEDURE — 93325 DOPPLER ECHO COLOR FLOW MAPG: CPT | Mod: 26 | Performed by: INTERNAL MEDICINE

## 2017-12-17 PROCEDURE — 99217 ZZC OBSERVATION CARE DISCHARGE: CPT | Mod: Z6 | Performed by: NURSE PRACTITIONER

## 2017-12-17 PROCEDURE — 25000132 ZZH RX MED GY IP 250 OP 250 PS 637: Performed by: NURSE PRACTITIONER

## 2017-12-17 PROCEDURE — 80048 BASIC METABOLIC PNL TOTAL CA: CPT | Performed by: NURSE PRACTITIONER

## 2017-12-17 PROCEDURE — 93321 DOPPLER ECHO F-UP/LMTD STD: CPT | Mod: 26 | Performed by: INTERNAL MEDICINE

## 2017-12-17 PROCEDURE — 93350 STRESS TTE ONLY: CPT | Mod: 26 | Performed by: INTERNAL MEDICINE

## 2017-12-17 PROCEDURE — 93018 CV STRESS TEST I&R ONLY: CPT | Performed by: INTERNAL MEDICINE

## 2017-12-17 PROCEDURE — 36415 COLL VENOUS BLD VENIPUNCTURE: CPT | Performed by: NURSE PRACTITIONER

## 2017-12-17 PROCEDURE — 80076 HEPATIC FUNCTION PANEL: CPT | Performed by: NURSE PRACTITIONER

## 2017-12-17 PROCEDURE — 25500064 ZZH RX 255 OP 636: Performed by: NURSE PRACTITIONER

## 2017-12-17 PROCEDURE — 85025 COMPLETE CBC W/AUTO DIFF WBC: CPT | Performed by: NURSE PRACTITIONER

## 2017-12-17 PROCEDURE — G0378 HOSPITAL OBSERVATION PER HR: HCPCS

## 2017-12-17 PROCEDURE — 93016 CV STRESS TEST SUPVJ ONLY: CPT | Performed by: INTERNAL MEDICINE

## 2017-12-17 RX ORDER — LISINOPRIL 10 MG/1
10 TABLET ORAL DAILY
Qty: 14 TABLET | Refills: 0 | Status: SHIPPED | OUTPATIENT
Start: 2017-12-17 | End: 2018-01-03

## 2017-12-17 RX ADMIN — ASPIRIN 81 MG CHEWABLE TABLET 81 MG: 81 TABLET CHEWABLE at 07:44

## 2017-12-17 RX ADMIN — ACETAMINOPHEN 650 MG: 325 TABLET, FILM COATED ORAL at 07:44

## 2017-12-17 RX ADMIN — LISINOPRIL 10 MG: 10 TABLET ORAL at 07:44

## 2017-12-17 RX ADMIN — RANITIDINE 150 MG: 150 TABLET ORAL at 07:44

## 2017-12-17 RX ADMIN — PERFLUTREN 5 ML: 6.52 INJECTION, SUSPENSION INTRAVENOUS at 11:00

## 2017-12-17 ASSESSMENT — ENCOUNTER SYMPTOMS
ACTIVITY IMPAIRMENT: NORMAL
DIETARY ISSUES: NPO
NO PATIENT REPORTED PAIN: 1

## 2017-12-17 ASSESSMENT — PAIN DESCRIPTION - DESCRIPTORS: DESCRIPTORS: ACHING

## 2017-12-17 NOTE — PROGRESS NOTES
Discharge instruction reviewed.  Patient verbalized understanding. PIV removed, patient ambulated to the main lobby. Security called to  patient at front lobby with ride arranged to [facility] where patient's car is located. Patient discharged with all belongings.

## 2017-12-17 NOTE — PLAN OF CARE
Problem: Patient Care Overview  Goal: Plan of Care/Patient Progress Review  Outpatient/Observation goals to be met before discharge home:      List all goals to be met before discharge home:   - Serial troponins and stress test complete - NO, Trops negative x 3, Exercise Stress Test this AM  - Seen and cleared by consultant if applicable - NO  - Adequate pain control on oral analgesia - Denies CP or SOB, Tylenol given for HA, relieved. No further c/o pain  - Vital signs normal or at patient baseline - YES  - Safe disposition plan has been identified - NO  - Nurse to notify provider when observation goals have been met and patient is ready for discharge     Patient A&O, VSS on RA, Ambulates independently, voiding spontaneously, on Clear Liquid with No Caffeine. On Tele SR 50'-60's Patient requested to not be disturbed overnight, cares grouped to minimize disturbing pt.

## 2017-12-17 NOTE — PLAN OF CARE
Problem: Patient Care Overview  Goal: Plan of Care/Patient Progress Review  Outpatient/Observation goals to be met before discharge home:    List all goals to be met before discharge home:   - Serial troponins and stress test complete - NO, Trops negative x 2, Exercise Stress Test in AM  - Seen and cleared by consultant if applicable - NO  - Adequate pain control on oral analgesia - NO, Nitro x 2 with Tylenol for HA, symptoms relieved  - Vital signs normal or at patient baseline - YES  - Safe disposition plan has been identified - NO  - Nurse to notify provider when observation goals have been met and patient is ready for discharge

## 2017-12-17 NOTE — ED NOTES
Pt very upset about her ED visit. Was upset that she could hear other pt's throwing up, was upset about how loud the ED was and that it would be loud when RNs would enter the room. Pt upset it took so long to get a bed. MD and RN attempted to apologize to pt and explain the ED process. Pt stated she was going to walk out of the ED if she wasn't on the floor by 1530, MD was notified and talked to pt. Pt willing to stay and go to the obs unit.

## 2017-12-17 NOTE — PLAN OF CARE
Problem: Patient Care Overview  Goal: Plan of Care/Patient Progress Review  Outpatient/Observation goals to be met before discharge home:    List all goals to be met before discharge home:     - Serial troponins and stress test complete - NO, stress test in AM.  - Seen and cleared by consultant if applicable - NO  - Adequate pain control on oral analgesia Denies pain.  - Vital signs normal or at patient baseline - YES  - Safe disposition plan has been identified - YES    Nurse to notify provider when observation goals have been met and patient is ready for discharge.

## 2017-12-17 NOTE — PLAN OF CARE
Problem: Patient Care Overview  Goal: Plan of Care/Patient Progress Review  Outpatient/Observation goals to be met before discharge home:    List all goals to be met before discharge home:     - Serial troponins and stress test complete - NO, stress test in AM.  - Seen and cleared by consultant if applicable - NO  - Adequate pain control on oral analgesia - Denies pain.  - Vital signs normal or at patient baseline - YES  - Safe disposition plan has been identified - YES    Nurse to notify provider when observation goals have been met and patient is ready for discharge.

## 2017-12-17 NOTE — PROGRESS NOTES
Mary Dixon is a 37 year old female patient.  1. Atypical chest pain      Past Medical History:   Diagnosis Date     Benign intracranial hypertension      Hypertension      Mononucleosis      Morbid obesity with BMI of 50.0-59.9, adult (H)      Ovarian cancer (H)     borderline ovarian cancer     Pneumonia      Post-menopausal bleeding      Pseudotumor cerebri      No current outpatient prescriptions on file.     No Known Allergies  Active Problems:    * No active hospital problems. *    Blood pressure 119/70, pulse 81, temperature 97.7  F (36.5  C), temperature source Oral, resp. rate 16, SpO2 95 %, not currently breastfeeding.    Subjective:  Symptoms:  Resolved.  She reports chest pain.    Diet:  NPO.    Activity level: Normal.    Pain:  She reports no pain.      Objective:  General Appearance:  Comfortable.    Vital signs: (most recent): Blood pressure 119/70, pulse 81, temperature 97.7  F (36.5  C), temperature source Oral, resp. rate 16, SpO2 95 %, not currently breastfeeding.  Vital signs are normal.    Output: Producing urine.    HEENT: Normal HEENT exam.    Lungs:  Normal respiratory rate and normal effort.  Breath sounds clear to auscultation.    Heart: Normal rate.  Regular rhythm.  S1 normal and S2 normal.    Extremities: Normal range of motion.    Neurological: Patient is alert and oriented to person, place and time.    Skin:  Warm.    Abdomen: Abdomen is soft.  Bowel sounds are normal.   There is no abdominal tenderness.     Pupils:  Pupils are equal, round, and reactive to light.    Pulses: Distal pulses are intact.      Assessment:    Condition: In stable condition.  Improving.   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     Plan:   (Awaiting stress echo results. If neg, anticipate DC and follow up with her PMD.).       Melchor Spain MD  12/17/2017    The pt was seen and examined by myself. The case was reviewed and the plan was discussed with the TIAN.

## 2017-12-17 NOTE — PLAN OF CARE
Problem: Patient Care Overview  Goal: Plan of Care/Patient Progress Review  Outpatient/Observation goals to be met before discharge home:      List all goals to be met before discharge home:   - Serial troponins and stress test complete - NO, Trops negative x 3, Exercise Stress Test completed, awaiting results  - Seen and cleared by consultant if applicable - NO, Pending results  - Adequate pain control on oral analgesia - Denies CP or SOB, Tylenol given for HA, relieved. No further c/o pain  - Vital signs normal or at patient baseline - YES  /70 (BP Location: Right arm)  Pulse 81  Temp 97.7  F (36.5  C) (Oral)  Resp 16  SpO2 95%  Breastfeeding? No  - Safe disposition plan has been identified - Pending  - Nurse to notify provider when observation goals have been met and patient is ready for discharge      Patient A&O, VSS on RA, Ambulates independently, voiding spontaneously, on Clear Liquid with No Caffeine. On Tele SR 50'-60's Patient requested to not be disturbed overnight, cares grouped to minimize disturbing pt.

## 2017-12-17 NOTE — PROGRESS NOTES
Problem: Patient Care Overview  Goal: Plan of Care/Patient Progress Review  Outpatient/Observation goals to be met before discharge home:     List all goals to be met before discharge home:   - Serial troponins and stress test complete - NO, Trops negative x 3, Exercise Stress Test in AM  - Seen and cleared by consultant if applicable - NO  - Adequate pain control on oral analgesia - NO, Nitro x 2 with Tylenol for HA, relieved. No further c/o pain  - Vital signs normal or at patient baseline - YES  /73 (BP Location: Right arm)  Pulse 81  Temp 97.9  F (36.6  C) (Oral)  Resp 16  SpO2 95%  Breastfeeding? No  - Safe disposition plan has been identified - NO  - Nurse to notify provider when observation goals have been met and patient is ready for discharge    Patient A&O, VSS on RA, Bolus NS completed. Patient ambulates independently, voiding spontaneously, tolerated regular diet and will be on Clear Liquid with No Caffeine after midnight. On Tele SR 80's. Patient request to not be disturbed overnight, patient will call if she wakes up and if having any pain. Informed oncoming night nurse.

## 2017-12-17 NOTE — DISCHARGE SUMMARY
ED Observation Discharge Summary    Mary Dixon   MRN# [id number]  Age: 37 year old   YOB: 1980            Date of Admission: 12/16/2017    Date of Discharge: 12/17/2017  Admitting Physician: Dr. Paul Birch MD  Discharge Physician: Dr. Grabiel MD/Medina Jose CNP        DISCHARGE DIAGNOSIS:   1. Atypical Chest Pain    INTERVAL HISTORY: VSS, afebrile. Up ad bonifacio. No further chest pain. Eating and drinking ok. Denies fevers, chills, headaches, dizziness, ough, SOB, wheezing, chest pain/pressure, abdominal pain, N/V, constipation, melena/hematochezia, diarrhea, dysuria, hematuria, extremity swelling/weakness/numbness/tingling, or signs of active bleeding.    PHYSICAL EXAM:   Blood pressure 119/70, pulse 81, temperature 97.7  F (36.5  C), temperature source Oral, resp. rate 16, SpO2 95 %, not currently breastfeeding.     GENERAL APPEARANCE: A/O x4. NAD.  SKIN: Clean, dry, and intact  HEENT/NECK: NCAT w/out masses, lesions, or abnormalities. Sclera anicteric, PERRLA, EOMI.  Oral mucosa pink and moist without erythema, exudate, lesions, ulcerations, or thrush. Teeth and gums normal. Neck supple, no masses. No jugular venous distention.   CARDIOVASCULAR: S1, S2 RRR. No murmurs, rubs, or gallops.   RESPIRATORY: Respiratory effort WNL. CTA  bilaterally without crackles/rales/wheeze   GI: Active BS in all 4 quadrants. Abdomen soft and non-tender. No masses or hepatosplenomegaly.  : Deferred  MUSCULOSKELETAL: Extremities normal, no gross deformities noted, non-tender to palpation.   PV: 2+ bilateral radial and pedal pulses. No edema noted.   NEURO: CN II-XII grossly intact. Speech normal. Appropriate throughout interview.   Sensation grossly WNL. Finger to nose and rapid alternating movements WDL  HEME/LYMPH: No visible bleeding.   PSYCHIATRIC: Mentation and affect appear normal  VASCULAR ACCESS: CDI without erythema or discharge. Non-tender.    PROCEDURES AND IMAGING:   Results for orders  placed or performed during the hospital encounter of 17 (from the past 24 hour(s))   EKG 12-lead, complete   Result Value Ref Range    Interpretation ECG Click View Image link to view waveform and result    Troponin I   Result Value Ref Range    Troponin I ES <0.015 0.000 - 0.045 ug/L   CBC with platelets differential   Result Value Ref Range    WBC 8.7 4.0 - 11.0 10e9/L    RBC Count 5.08 3.8 - 5.2 10e12/L    Hemoglobin 15.2 11.7 - 15.7 g/dL    Hematocrit 44.7 35.0 - 47.0 %    MCV 88 78 - 100 fl    MCH 29.9 26.5 - 33.0 pg    MCHC 34.0 31.5 - 36.5 g/dL    RDW 13.3 10.0 - 15.0 %    Platelet Count 308 150 - 450 10e9/L    Diff Method Automated Method     % Neutrophils 49.4 %    % Lymphocytes 39.6 %    % Monocytes 8.0 %    % Eosinophils 2.3 %    % Basophils 0.5 %    % Immature Granulocytes 0.2 %    Nucleated RBCs 0 0 /100    Absolute Neutrophil 4.3 1.6 - 8.3 10e9/L    Absolute Lymphocytes 3.5 0.8 - 5.3 10e9/L    Absolute Monocytes 0.7 0.0 - 1.3 10e9/L    Absolute Eosinophils 0.2 0.0 - 0.7 10e9/L    Absolute Basophils 0.0 0.0 - 0.2 10e9/L    Abs Immature Granulocytes 0.0 0 - 0.4 10e9/L    Absolute Nucleated RBC 0.0    Basic metabolic panel   Result Value Ref Range    Sodium 141 133 - 144 mmol/L    Potassium 4.0 3.4 - 5.3 mmol/L    Chloride 108 94 - 109 mmol/L    Carbon Dioxide 27 20 - 32 mmol/L    Anion Gap 5 3 - 14 mmol/L    Glucose 142 (H) 70 - 99 mg/dL    Urea Nitrogen 12 7 - 30 mg/dL    Creatinine 0.64 0.52 - 1.04 mg/dL    GFR Estimate >90 >60 mL/min/1.7m2    GFR Estimate If Black >90 >60 mL/min/1.7m2    Calcium 8.7 8.5 - 10.1 mg/dL   Hemoglobin A1c   Result Value Ref Range    Hemoglobin A1C 8.2 (H) 4.3 - 6.0 %   Echo stress test with definity    Narrative    [id number]  [id number]  [id number]  [id number]^RAMA^BRITTNEY^NEREIDA           [facility],[place]  Echocardiography Laboratory  [address] [place] [id number]  Name: SAMMOMO BERRIOSFRANSISCA MATHEWS  MRN: [id number]  : 1980  Study Date:  12/17/2017 10:44 AM  Age: 37 yrs  Gender: Female  Patient Location: [facility]  Reason For Study: Chest Pain  Ordering Physician: BRITTNEY ONTIVEROS  Performed By: LAUREN Hodge     BSA: 2.6 m2  Height: 69 in  Weight: 357 lb  HR: 65  BP: 113/78 mmHg  _____________________________________________________________________________  __        Procedure  Stress Echo Bike with two dimensional, color and spectral Doppler performed.  _____________________________________________________________________________  __        Interpretation Summary  This was a normal stress echocardiogram with no evidence of stress-induced  ischemia. This was a normal stress EKG with no evidence of stress-induced  ischemia. No stress induced regional wall motion abnormalities. No ECG  evidence of ischemia. No significant valvular disease noted on routine  screening color flow Doppler and pulsed Doppler examination. Normal functional  capacity.  _____________________________________________________________________________  __     Stress  This was a normal stress EKG with no evidence of stress-induced ischemia.  Peak MVO2 11.1 ml/kg/min .  Percent predicted MVO2 34 %.  RPP 23,328.  Maximum workload 125 alvarez.  Exercise was stopped due to fatigue.  Target Heart Rate was not achieved due to fatigue.  The patient did not exhibit any symptoms during exercise.     Rest  The baseline ECG displays normal sinus rhythm.  The patient did not exhibit any symptoms during exercise.     Stress Results                                       Maximum Predicted HR:   185 bpm             Target HR: 157 bpm        % Maximum Predicted HR: 78 %                             Stage DurationHeart Rate   BP                                 (mm:ss)   (bpm)                        Baseline  0:00      65     113/78                          Peak    7:57      144   162/100                            Stress Duration:   7:57 mm:ss *                      Maximum Stress HR: 144 bpm *      Left Ventricle  Left ventricular systolic function is normal. Ejection Fraction = >55%. The  left ventricle is normal in size. There is normal left ventricular wall  thickness. No regional wall motion abnormalities noted.     Aortic Valve  The aortic valve is normal in structure and function.     Mitral Valve  The mitral valve is normal in structure and function.        Atria  The left atrial size is normal. Right atrial size is normal.     Tricuspid Valve  The tricuspid valve is normal in structure and function.     Right Ventricle  The right ventricular systolic function is normal. The right ventricle is  normal size.     Vessels  The aortic root is normal in size.     Pericardium  There is no pericardial effusion.        Contrast  Definity (NDC #98741-207-48) given intravenously. Patient was given 5ml  mixture of 1.5ml Definity and 8.5ml saline. 5 ml wasted. IV start location L  Forearm . Definity Expiration 12/01/2018 . Definity Lot # 4721 .  _____________________________________________________________________________  __  MMode/2D Measurements & Calculations     asc Aorta Diam: 3.0 cm        Doppler Measurements & Calculations  MV E max justa: 67.9 cm/sec  MV A max justa: 55.1 cm/sec  MV E/A: 1.2  MV dec time: 0.22 sec           _____________________________________________________________________________  __           Report approved by: Gideon Dougherty 12/17/2017 11:36 AM        DISCHARGE MEDICATIONS:   Current Discharge Medication List      START taking these medications    Details   ranitidine (ZANTAC) 150 MG tablet Take 1 tablet (150 mg) by mouth 2 times daily  Qty: 60 tablet, Refills: 0    Associated Diagnoses: Atypical chest pain         CONTINUE these medications which have CHANGED    Details   lisinopril (PRINIVIL/ZESTRIL) 10 MG tablet Take 1 tablet (10 mg) by mouth daily  Qty: 14 tablet, Refills: 0    Associated Diagnoses: Essential hypertension               CONSULTATIONS:   Consultation during  "this admission received from:  N/A    BRIEF HISTORY OF PRESENT ILLNESS:   (Adopted from admission H&P).    \"Mary Dixon is a 37 year old female with a history of hypertension who presented to the emergency room with left arm achiness radiating into her left side of her jaw along with shortness of breath. 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 She has been wearing a mask at work as she does not wish to receive the flu shot. Last night she couldn't wear the mask due to SOB. She also notes several people in her life who have experienced heart attacks in the past few months. She denies known family history of early cardiac disease or death. She states her paternal grandfather at a possible event in his 30's but lived until his 80's.  Patient does have significant past medical history for ovarian cancer (status post removal of her ovaries).  She has a history of borderline hypertension and borderline diabetes but is not currently on medications for the diabetes.         In the ED her work-up revealed normal VS, negative troponin x 2, negative EKG. Her chest x-ray was clear. She is admitted to observation for ACS rule out.      On admission to the observation unit the patient was stable. She notes a sensation of a \"lump\" in her throat. No overt chest pain. Offered option of GI cocktail, observe, versus trial of nitro. She would like to try nitro. Does not feel like typical reflux symptoms.\"     ED OBSERVATION COURSE: Mary Dixon is a 37 year old female with a history of hypertension who presented to the emergency room with left arm achiness radiating into her left side of her jaw along with shortness of breath.      1. Chest Pain, SOB: Now resolved. No chest pain here, but with sensation of lump in her throat upon admission, relieved with nitro. No pain this am. History of hypertension and obesity. " Troponin negative x3, EKG unremarkable x 2. Chest x-ray is clear. Exercise Stress Test this morning is normal. Although didn't completely get to target HR given negative work-up and resolution of pain, feel comfortable discharging to home. Will start Zantac 150 mg's BID. She was instructed to return to the ED as below.       2. Hyperglycemia: . Does not follow with a PCP regularly. hgb A1C 8.2. Patient very upset this was checked. Recommended she follow-up with her PCP for treatment. She understands this and risks of untreated disease.     3. Hypertension: Continue lisinopril. I send her a 2 week supply until she is able to follow-up with her PCP.       DISCHARGE DISPOSITION:   Discharged to home. The patient was discharged in a stable condition.     DISCHARGE INSTRUCTIONS AND FOLLOW-UP:    Discharge Procedure Orders  Reason for your hospital stay   Order Comments: You were admitted to the observation unit for evaluation of your chest pain.  Your EKG was normal, labs checking for heart damage were negative, chest x-ray was normal.  You underwent a stress test and this was normal. You did not achieve target heart rate, but you were very close. Given your otherwise normal work-up we can safely discharge you to home.     Activity   Order Comments: Your activity upon discharge: activity as tolerated   Order Specific Question Answer Comments   Is discharge order? Yes      When to contact your care team   Order Comments: Return to the ED with fever, uncontrolled nausea, vomiting, unrelieved pain, bleeding not relieved with pressure, dizziness, chest pain, shortness of breath, loss of consciousness, and any new or concerning symptoms.     Please go to your nearest emergency room If you were to have a change in the type of chest pain i.e more severe, lasting longer or radiating to your shoulder, arm, neck, jaw or back, shortness of breath or increased pain with breathing, coughing up blood, feel dizzy or lightheaded,  or notice swelling in one leg.     Adult [facility] Follow-up and recommended labs and tests   Order Comments: Follow up with primary care provider, Aldo Del Valle, within 2 days for hospital follow- up.  You hgb A1C is 8.0, indicating Diabetes, I recommend you discussed with your primary care MD and get on medications for this.     Appointments on [facility] and/or [facility] (with [facility] or [facility] provider or service). Call [phone] if you haven't heard regarding these appointments within 7 days of discharge.     Diet   Order Comments: Follow this diet upon discharge:     Regular Diet Adult  Be sure to drink plenty of non-caffeinated beverages.   Order Specific Question Answer Comments   Is discharge order? Yes         Attestation:   I have reviewed today's vital signs, notes, medications, labs and imaging.      LYLE Larsen, CNP  Emergency Department Observation Unit

## 2017-12-18 ENCOUNTER — TELEPHONE (OUTPATIENT)
Dept: FAMILY MEDICINE | Facility: CLINIC | Age: 37
End: 2017-12-18

## 2017-12-18 LAB — INTERPRETATION ECG - MUSE: NORMAL

## 2017-12-18 NOTE — TELEPHONE ENCOUNTER
"ED / Discharge Outreach Protocol    Patient Contact    Attempt # 1    Was call answered?  Yes.  \"May I please speak with <patient name>\"  Is patient available?   No. Left message with voicemail for patient to call me back.    "

## 2017-12-18 NOTE — TELEPHONE ENCOUNTER
ED/UC/IP follow up phone call: Atypical Chest Pain    RN please call to follow up.    Number of ED visits in past 12 months = 1    Marnie Sosa  [facility] [place]

## 2017-12-19 LAB — INTERPRETATION ECG - MUSE: NORMAL

## 2017-12-19 NOTE — TELEPHONE ENCOUNTER
Voice message was left.12/18/17 at 4:38 PM    Pt says she was returning call to Lisa WORTHY. 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 [phone]

## 2017-12-19 NOTE — TELEPHONE ENCOUNTER
"ED/Discharge Protocol    \"Hi, my name is Lisa Leos, a registered nurse, and I am calling on behalf of Dr. Del Valle's office at [facility].  I am calling to follow up and see how things are going for you after your recent visit.\"    \"I see that you were in the (ER/UC/IP) on 12-15-17.    How are you doing now that you are home?\" fine    Is patient experiencing symptoms that may require a hospital visit?  no    Discharge Instructions    \"Let's review your discharge instructions.  What is/are the follow-up recommendations?  Pt. Response: f/u with pcp.     \"Were you instructed to make a follow-up appointment?\"  Pt. Response: Assisted with appointment.     \"When you see the provider, I would recommend that you bring your discharge instructions with you.    Medications    \"How many new medications are you on since your hospitalization/ED visit?\"    0-1  \"How many of your current medicines changed (dose, timing, name, etc.) while you were in the hospital/ED visit?\"   0-1  \"Do you have questions about your medications?\"   No  \"Were you newly diagnosed with heart failure, COPD, diabetes or did you have a heart attack?\"   No  For patients on insulin: \"Did you start on insulin in the hospital or did you have your insulin dose changed?\"   No    Medication reconciliation completed? Yes    Was MTM referral placed (*Make sure to put transitions as reason for referral)?   No    Call Summary    \"Do you have any questions or concerns about your condition or care plan at the moment?\"    No  Triage nurse advice given: Encouraged to call with questions or concerns.     Patient was in ER 1 in the past year (assess appropriateness of ER visits.)      \"If you have questions or things don't continue to improve, we encourage you contact us through the main clinic number,  [phone].  Even if the clinic is not open, triage nurses are available 24/7 to help you.     We would like you to know that our clinic has extended hours (provide " "information).  We also have urgent care (provide details on closest location and hours/contact info)\"      \"Thank you for your time and take care!\"        "

## 2018-01-03 ENCOUNTER — OFFICE VISIT (OUTPATIENT)
Dept: FAMILY MEDICINE | Facility: CLINIC | Age: 38
End: 2018-01-03
Payer: COMMERCIAL

## 2018-01-03 VITALS
HEART RATE: 86 BPM | BODY MASS INDEX: 51.98 KG/M2 | SYSTOLIC BLOOD PRESSURE: 132 MMHG | RESPIRATION RATE: 16 BRPM | TEMPERATURE: 97.8 F | DIASTOLIC BLOOD PRESSURE: 88 MMHG | WEIGHT: 293 LBS

## 2018-01-03 DIAGNOSIS — N93.8 DUB (DYSFUNCTIONAL UTERINE BLEEDING): ICD-10-CM

## 2018-01-03 DIAGNOSIS — E66.01 MORBID OBESITY (H): ICD-10-CM

## 2018-01-03 DIAGNOSIS — F41.9 ANXIETY: Primary | ICD-10-CM

## 2018-01-03 DIAGNOSIS — I10 ESSENTIAL HYPERTENSION: ICD-10-CM

## 2018-01-03 PROCEDURE — 99214 OFFICE O/P EST MOD 30 MIN: CPT | Performed by: FAMILY MEDICINE

## 2018-01-03 RX ORDER — FLUOXETINE 10 MG/1
10 CAPSULE ORAL DAILY
Qty: 14 CAPSULE | Refills: 0 | Status: SHIPPED | OUTPATIENT
Start: 2018-01-03 | End: 2018-07-27

## 2018-01-03 RX ORDER — LISINOPRIL 10 MG/1
10 TABLET ORAL DAILY
Qty: 90 TABLET | Refills: 3 | Status: SHIPPED | OUTPATIENT
Start: 2018-01-03 | End: 2019-02-25

## 2018-01-03 NOTE — NURSING NOTE
"Chief Complaint   Patient presents with     Chest Pain     Several weeks       Initial /88  Pulse 86  Temp 97.8  F (36.6  C) (Tympanic)  Resp 16  Wt (!) 352 lb (159.7 kg)  LMP 12/13/2017  BMI 51.98 kg/m2 Estimated body mass index is 51.98 kg/(m^2) as calculated from the following:    Height as of 5/20/17: 5' 9\" (1.753 m).    Weight as of this encounter: 352 lb (159.7 kg).  Medication Reconciliation: complete    Health Maintenance that is potentially due pending provider review:  NONE    n/a    Is there anyone who you would like to be able to receive your results? No  If yes have patient fill out JIM    "

## 2018-01-03 NOTE — MR AVS SNAPSHOT
After Visit Summary   1/3/2018    Mary Dixon    MRN: [id number]           Patient Information     Date Of Birth          1980        Visit Information        Provider Department      1/3/2018 7:20 AM Aldo Del Valle MD [facility]        Today's Diagnoses     Anxiety    -  1    Essential hypertension        DUB (dysfunctional uterine bleeding)           Follow-ups after your visit        Additional Services     OB/GYN REFERRAL       Your provider has referred you to:  [facility]: [facility] [phone]   [email or web address]    Please be aware that coverage of these services is subject to the terms and limitations of your health insurance plan.  Call member services at your health plan with any benefit or coverage questions.      Please bring the following with you to your appointment:    (1) Any X-Rays, CTs or MRIs which have been performed.  Contact the facility where they were done to arrange for  prior to your scheduled appointment.   (2) List of current medications   (3) This referral request   (4) Any documents/labs given to you for this referral                  Your next 10 appointments already scheduled     Feb 05, 2018  9:40 AM CST   Office Visit with Aldo Del Valle MD   [facility] ([facility])    [address] [address] [phone]           Bring a current list of meds and any records pertaining to this visit. For Physicals, please bring immunization records and any forms needing to be filled out. Please arrive 10 minutes early to complete paperwork.              Who to contact     If you have questions or need follow up information about today's clinic visit or your schedule please contact [facility] directly at [phone].  Normal or non-critical lab and imaging results will be communicated to you by MyChart, letter or phone within 4 business days after  the clinic has received the results. If you do not hear from us within 7 days, please contact the clinic through [facility] or phone. If you have a critical or abnormal lab result, we will notify you by phone as soon as possible.  Submit refill requests through [facility] or call your pharmacy and they will forward the refill request to us. Please allow 3 business days for your refill to be completed.          Additional Information About Your Visit        [facility] Information     [facility] gives you secure access to your electronic health record. If you see a primary care provider, you can also send messages to your care team and make appointments. If you have questions, please call your primary care clinic.  If you do not have a primary care provider, please call [phone] and they will assist you.        Care EveryWhere ID     This is your Care EveryWhere ID. This could be used by other organizations to access your [facility] medical records  [id number]        Your Vitals Were     Pulse Temperature Respirations Last Period BMI (Body Mass Index)       86 97.8  F (36.6  C) (Tympanic) 16 12/13/2017 51.98 kg/m2        Blood Pressure from Last 3 Encounters:   01/03/18 132/88   12/17/17 119/70   05/20/17 124/73    Weight from Last 3 Encounters:   01/03/18 (!) 352 lb (159.7 kg)   05/20/17 (!) 357 lb (161.9 kg)   03/31/17 (!) 353 lb (160.1 kg)              We Performed the Following     OB/GYN REFERRAL          Today's Medication Changes          These changes are accurate as of: 1/3/18  8:11 AM.  If you have any questions, ask your nurse or doctor.               Start taking these medicines.        Dose/Directions    * FLUoxetine 10 MG capsule   Commonly known as:  PROzac   Used for:  Anxiety   Started by:  Aldo Del Valle MD        Dose:  10 mg   Take 1 capsule (10 mg) by mouth daily   Quantity:  14 capsule   Refills:  0       * FLUoxetine 20 MG capsule   Commonly known as:  PROzac   Used for:  Anxiety   Started by:  Ivon  Aldo HODGE MD        Dose:  20 mg   Take 1 capsule (20 mg) by mouth daily   Quantity:  30 capsule   Refills:  3       * Notice:  This list has 2 medication(s) that are the same as other medications prescribed for you. Read the directions carefully, and ask your doctor or other care provider to review them with you.         Where to get your medicines      These medications were sent to [facility] - [address] [address]     Phone:  [phone]     FLUoxetine 10 MG capsule         These medications were sent to [facility] [address] [place], [place] [address] AT [facility] & [address] [place] [address]     Phone:  [phone]     lisinopril 10 MG tablet         Some of these will need a paper prescription and others can be bought over the counter.  Ask your nurse if you have questions.     Bring a paper prescription for each of these medications     FLUoxetine 20 MG capsule                Primary Care Provider Office Phone # Fax #    Aldo Del Valle [phone]       [address] [place] [address]        Equal Access to Services     JOE YOUNG AH: Hadii aad ku hadasho Soomaali, waaxda luqadaha, qaybta kaalmada adeegyada, waxay blazein hayaan adeangely serrato. So [facility] [phone].    ATENCIÓN: Si habla español, tiene a blanchard disposición servicios gratuitos de asistencia lingüística. Mary al [phone].    We comply with applicable federal civil rights laws and [place] laws. We do not discriminate on the basis of race, color, national origin, age, disability, sex, sexual orientation, or gender identity.            Thank you!     Thank you for choosing [facility]  for your care. Our goal is always to provide you with excellent care. Hearing back from our patients is one way we can continue to improve our services. Please take a few minutes to complete  the written survey that you may receive in the mail after your visit with us. Thank you!             Your Updated Medication List - Protect others around you: Learn how to safely use, store and throw away your medicines at www.disposemymeds.org.          This list is accurate as of: 1/3/18  8:11 AM.  Always use your most recent med list.                   Brand Name Dispense Instructions for use Diagnosis    * FLUoxetine 10 MG capsule    PROzac    14 capsule    Take 1 capsule (10 mg) by mouth daily    Anxiety       * FLUoxetine 20 MG capsule    PROzac    30 capsule    Take 1 capsule (20 mg) by mouth daily    Anxiety       lisinopril 10 MG tablet    PRINIVIL/ZESTRIL    90 tablet    Take 1 tablet (10 mg) by mouth daily    Essential hypertension       ranitidine 150 MG tablet    ZANTAC    60 tablet    Take 1 tablet (150 mg) by mouth 2 times daily    Atypical chest pain       * Notice:  This list has 2 medication(s) that are the same as other medications prescribed for you. Read the directions carefully, and ask your doctor or other care provider to review them with you.

## 2018-01-03 NOTE — PROGRESS NOTES
"  SUBJECTIVE:   Mary Dixon is a 37 year old female who presents to clinic today for the following health issues:       Chest Pain      Duration: Several weeks    Description (location/character/radiation): chest pain, was in ED to Hospital, Stress test     Intensity:  moderate    Accompanying signs and symptoms: none    History (similar episodes/previous evaluation): 12/16-12/17 - hospitalized, stress test    Precipitating or alleviating factors: None    Therapies tried and outcome: None         S: Mary Dixon is a 37 year old female with stress.  Anxiety.  Will get pain with it.      Dealing with medical issues, death in family, work issues.  Wants to get on medication    Elevated glucose: doesn't want to address this today.  Knows her sugars have been higher.  Working on diet and weight loss.  \"we can address this in more detail in another visit, I don't want to talk more about it today.\"      Has had some post oopherectomy bleeding, extensive workup, multiple endometrial biopsies, currently doesn't have a general OB/GYN.  Needs one.     Hx of PCOS as well.  Has been on metformin in past, not sure she wants to go back on or not.      Morbid obesity: complicating all of above    Htn: needs fills. Reviewed labs and stress test info.      Problem list, med list, additional histories reviewed and updated, as indicated.      O:/88  Pulse 86  Temp 97.8  F (36.6  C) (Tympanic)  Resp 16  Wt (!) 352 lb (159.7 kg)  LMP 12/13/2017  BMI 51.98 kg/m2  GEN: Alert and oriented, in no acute distress, but gets anxious/teary when talking about her stress    A: anxiety       Morbid obesity        Dysfunctional uterine bleeding       Elevated glucose        Likely polycystic ovarian syndrome    P: needs ob/gyn to follow her, not just her oncologist.  She agrees.  c onsult given    Will have her start on prozac 10mg, titrate to 20mg after a  Few weeks.  Back in 6 weeks    Will need to address her sugars/diabetic " issues in more detail on follow up if she's willing to discuss.  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    Weight management plan: diet/exercise

## 2018-01-16 ENCOUNTER — TRANSFERRED RECORDS (OUTPATIENT)
Dept: HEALTH INFORMATION MANAGEMENT | Facility: CLINIC | Age: 38
End: 2018-01-16

## 2018-01-16 ENCOUNTER — OFFICE VISIT (OUTPATIENT)
Dept: OBGYN | Facility: CLINIC | Age: 38
End: 2018-01-16
Payer: COMMERCIAL

## 2018-01-16 VITALS
HEIGHT: 69 IN | RESPIRATION RATE: 17 BRPM | DIASTOLIC BLOOD PRESSURE: 85 MMHG | HEART RATE: 94 BPM | SYSTOLIC BLOOD PRESSURE: 124 MMHG | TEMPERATURE: 99.1 F | BODY MASS INDEX: 43.4 KG/M2 | WEIGHT: 293 LBS

## 2018-01-16 DIAGNOSIS — N95.0 PMB (POSTMENOPAUSAL BLEEDING): Primary | ICD-10-CM

## 2018-01-16 DIAGNOSIS — R45.86 MOOD SWINGS: ICD-10-CM

## 2018-01-16 DIAGNOSIS — L68.0 HIRSUTISM: ICD-10-CM

## 2018-01-16 DIAGNOSIS — Z85.43 PERSONAL HISTORY OF OVARIAN CANCER: ICD-10-CM

## 2018-01-16 PROBLEM — R07.89 ATYPICAL CHEST PAIN: Status: ACTIVE | Noted: 2018-01-16

## 2018-01-16 LAB — PAP-ABSTRACT: NORMAL

## 2018-01-16 PROCEDURE — 99204 OFFICE O/P NEW MOD 45 MIN: CPT | Performed by: OBSTETRICS & GYNECOLOGY

## 2018-01-16 NOTE — NURSING NOTE
"Chief Complaint   Patient presents with     Consult     abnormal uterine bleeding with hormonal issues.        Initial /85 (BP Location: Right arm, Patient Position: Chair, Cuff Size: Adult Large)  Pulse 94  Temp 99.1  F (37.3  C) (Tympanic)  Resp 17  Ht 5' 9\" (1.753 m)  Wt (!) 352 lb 3.2 oz (159.8 kg)  LMP 12/13/2017  Breastfeeding? No  BMI 52.01 kg/m2 Estimated body mass index is 52.01 kg/(m^2) as calculated from the following:    Height as of this encounter: 5' 9\" (1.753 m).    Weight as of this encounter: 352 lb 3.2 oz (159.8 kg).  Medication Reconciliation: complete       Vivienne Coker CMA      "

## 2018-01-16 NOTE — MR AVS SNAPSHOT
After Visit Summary   1/16/2018    Mary Dixon    MRN: [id number]           Patient Information     Date Of Birth          1980        Visit Information        Provider Department      1/16/2018 1:00 PM Violeta Villegas MD [facility]        Today's Diagnoses     PMB (postmenopausal bleeding)    -  1    Personal history of ovarian cancer        Hirsutism        Mood swings (H)           Follow-ups after your visit        Your next 10 appointments already scheduled     Feb 05, 2018  9:40 AM CST   Office Visit with Aldo Del Valle MD   [facility] ([facility])    [address] [place] [id number] [phone]           Bring a current list of meds and any records pertaining to this visit. For Physicals, please bring immunization records and any forms needing to be filled out. Please arrive 10 minutes early to complete paperwork.              Who to contact     If you have questions or need follow up information about today's clinic visit or your schedule please contact [facility] directly at [phone].  Normal or non-critical lab and imaging results will be communicated to you by [employer], letter or phone within 4 business days after the clinic has received the results. If you do not hear from us within 7 days, please contact the clinic through [employer] or phone. If you have a critical or abnormal lab result, we will notify you by phone as soon as possible.  Submit refill requests through [employer] or call your pharmacy and they will forward the refill request to us. Please allow 3 business days for your refill to be completed.          Additional Information About Your Visit        MyChart Information     [employer] gives you secure access to your electronic health record. If you see a primary care provider, you can also send messages to your care team and make appointments. If you have questions, please call your primary care clinic.   "If you do not have a primary care provider, please call [phone] and they will assist you.        Care EveryWhere ID     This is your Care EveryWhere ID. This could be used by other organizations to access your [facility] medical records  [id number]        Your Vitals Were     Pulse Temperature Respirations Height Last Period Breastfeeding?    94 99.1  F (37.3  C) (Tympanic) 17 5' 9\" (1.753 m) 12/13/2017 No    BMI (Body Mass Index)                   52.01 kg/m2            Blood Pressure from Last 3 Encounters:   01/16/18 124/85   01/03/18 132/88   12/17/17 119/70    Weight from Last 3 Encounters:   01/16/18 (!) 352 lb 3.2 oz (159.8 kg)   01/03/18 (!) 352 lb (159.7 kg)   05/20/17 (!) 357 lb (161.9 kg)              Today, you had the following     No orders found for display       Primary Care Provider Office Phone # Fax #    Aldo Del Valle [phone]       [address] [place] [phone]        Equal Access to Services     [facility]: Hadii aad ku hadasho Soomaali, waaxda luqadaha, qaybta kaalmada adeegyada, steven gonzales . So [facility] [phone].    ATENCIÓN: Si habla español, tiene a blanchard disposición servicios gratuitos de asistencia lingüística. [facility] [phone].    We comply with applicable federal civil rights laws and [place] laws. We do not discriminate on the basis of race, color, national origin, age, disability, sex, sexual orientation, or gender identity.            Thank you!     Thank you for choosing [facility]  for your care. Our goal is always to provide you with excellent care. Hearing back from our patients is one way we can continue to improve our services. Please take a few minutes to complete the written survey that you may receive in the mail after your visit with us. Thank you!             Your Updated Medication List - Protect others around you: Learn how to safely use, store and throw away your medicines at " www.disposemymeds.org.          This list is accurate as of: 1/16/18  2:04 PM.  Always use your most recent med list.                   Brand Name Dispense Instructions for use Diagnosis    * FLUoxetine 10 MG capsule    PROzac    14 capsule    Take 1 capsule (10 mg) by mouth daily    Anxiety       * FLUoxetine 20 MG capsule    PROzac    30 capsule    Take 1 capsule (20 mg) by mouth daily    Anxiety       lisinopril 10 MG tablet    PRINIVIL/ZESTRIL    90 tablet    Take 1 tablet (10 mg) by mouth daily    Essential hypertension       ranitidine 150 MG tablet    ZANTAC    60 tablet    Take 1 tablet (150 mg) by mouth 2 times daily    Atypical chest pain       * Notice:  This list has 2 medication(s) that are the same as other medications prescribed for you. Read the directions carefully, and ask your doctor or other care provider to review them with you.

## 2018-01-16 NOTE — PROGRESS NOTES
"Mary is a 37 year old  here for follow up of postmenopausal bleeding.  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  She had episodes of spotting afterward, so she was started on hormone replacement therapy.  Took pills and patches, but discontinued.  Has been off for at least 2 years.      Was seen earlier today by [facility] (Petra Fowler/Maicol) who performed a pap and endometrial biopsy.      She currently feels emotional and frustrated.  She cries easily.  Has been recently started on prozac.  She is temp sensitive, but denies disruptive hot flashes.  Doesn't like to take medications.      Also has difficulty with excessive hair on the chin.  Is frustrated by her weight as well.      ROS: Ten point review of systems was reviewed and negative except the above.    PMH: Her past medical, surgical, and obstetric histories were reviewed and are documented in their appropriate chart areas.    ALL/Meds: Her medication and allergy histories were reviewed and are documented in their appropriate chart areas.    Social History   Substance Use Topics     Smoking status: Current Every Day Smoker     Packs/day: 0.50     Years: 2.00     Types: Cigarettes     Smokeless tobacco: Never Used     Alcohol use No      PE: /85 (BP Location: Right arm, Patient Position: Chair, Cuff Size: Adult Large)  Pulse 94  Temp 99.1  F (37.3  C) (Tympanic)  Resp 17  Ht 5' 9\" (1.753 m)  Wt (!) 352 lb 3.2 oz (159.8 kg)  LMP 2017  Breastfeeding? No  BMI 52.01 kg/m2    General Appearance:  healthy, alert, active, no distress  HEENT: NCAT  Abdomen: Soft, nontender.  Normal bowel sounds.  No masses  Pelvic exam: deferred    A/P 37 year old  here for     ICD-10-CM    1. PMB (postmenopausal bleeding) N95.0    2. Personal history of ovarian cancer Z85.43    3. Hirsutism L68.0    4. Mood swings (H) F39         1. PMB: endometrial biopsy pending.  Patient already seen by her Gyn " Onc.  Discussed that if her biopsy is negative, she could consider progestin therapy or Mirena if the issue is endometrial stimulation by estrone.  Patient has tolerate many endometrial biopsies, so I suspect she would do well with a Mirena.  Pamphlet given to patient  2. Ovary cancer: per Dr. Milian  3. Hirsutism: related to her early surgical menopause.  Discussed options of topical therapy, laser hair removal, and spironolactone.  Discussed that Vaniaq and spironolactone would address new growth rather than existing growth.  4. Mood swings: discussed that while it may be related to her surgical menopause, these symptoms she describes would be better addressed through SSRI therapy.  Patient is in the early stages of being on prozac.  Encouraged patient to give medication 6-8 weeks to see if she sees improvement.  Reassured patient that there were many options for therapy.     Violeta Villegas M.D.      45 minutes was spent face to face with the patient today discussing her history, diagnosis, and follow-up plan as noted above.  Over 50% of the visit was spent in counseling and coordination of care.

## 2018-01-17 ENCOUNTER — MYC MEDICAL ADVICE (OUTPATIENT)
Dept: FAMILY MEDICINE | Facility: CLINIC | Age: 38
End: 2018-01-17

## 2018-01-17 DIAGNOSIS — Z29.89 ALTITUDE SICKNESS PROPHYLAXIS: Primary | ICD-10-CM

## 2018-01-17 RX ORDER — ACETAZOLAMIDE 500 MG/1
CAPSULE, EXTENDED RELEASE ORAL
Qty: 6 CAPSULE | Refills: 0 | Status: SHIPPED | OUTPATIENT
Start: 2018-01-17 | End: 2018-07-27

## 2018-01-17 NOTE — TELEPHONE ENCOUNTER
Per [employer]:    Acute mountain sickness; Treatment and Prophylaxis   500 to 1000 mg ORALLY daily, in divided doses; during rapid ascent, 1000 mg/day is recommended; initiate 24 to 48 hours before ascent and continue for 48 hours or longer while at high altitude

## 2018-02-05 ENCOUNTER — MYC MEDICAL ADVICE (OUTPATIENT)
Dept: FAMILY MEDICINE | Facility: CLINIC | Age: 38
End: 2018-02-05

## 2018-02-05 ENCOUNTER — OFFICE VISIT (OUTPATIENT)
Dept: FAMILY MEDICINE | Facility: CLINIC | Age: 38
End: 2018-02-05
Payer: COMMERCIAL

## 2018-02-05 VITALS
WEIGHT: 293 LBS | HEART RATE: 89 BPM | BODY MASS INDEX: 50.95 KG/M2 | OXYGEN SATURATION: 98 % | DIASTOLIC BLOOD PRESSURE: 82 MMHG | RESPIRATION RATE: 20 BRPM | SYSTOLIC BLOOD PRESSURE: 122 MMHG | TEMPERATURE: 98.1 F

## 2018-02-05 DIAGNOSIS — J06.9 UPPER RESPIRATORY TRACT INFECTION, UNSPECIFIED TYPE: Primary | ICD-10-CM

## 2018-02-05 DIAGNOSIS — R07.89 ATYPICAL CHEST PAIN: ICD-10-CM

## 2018-02-05 PROCEDURE — 99213 OFFICE O/P EST LOW 20 MIN: CPT | Performed by: FAMILY MEDICINE

## 2018-02-05 NOTE — PROGRESS NOTES
SUBJECTIVE:   Mary Dixon is a 37 year old female who presents to clinic today for the following health issues:      Emotional follow up on meds    Amount of exercise or physical activity: None    Problems taking medications regularly: No    Medication side effects: none    Diet: regular (no restrictions)      RESPIRATORY SYMPTOMS      Duration: 4 days    Description  rhinorrhea, sore throat, cough, wheezing, headache, fatigue/malaise and hoarse voice    Severity: moderate    Accompanying signs and symptoms: None    History (predisposing factors):  tobacco abuse    Precipitating or alleviating factors: works at [employer]    Therapies tried and outcome:  Mucinex, water and rest, and echinacea      S: Mary Dixon is a 37 year old female with cough, congestion.  Has missed some work.      No gi/gu issues. No fever.  No rash    Problem list, med list, additional histories reviewed and updated, as indicated.      O:/82  Pulse 89  Temp 98.1  F (36.7  C) (Tympanic)  Resp 20  Wt (!) 345 lb (156.5 kg)  LMP 01/22/2018  SpO2 98%  BMI 50.95 kg/m2   GEN: Alert and oriented, in no acute distress  CV: RRR, no murmur  RESP: lungs clear bilaterally, good effort  ENT: oropharynx clear, no exudate or palate/tonsil asymmetry  Neck: neck supple without mass or lymphadenopathy  No rash    A: uri, likely viral    P: supportive cares.      She will f/u in a few months for prozac recheck.  Wants to stay on for now.

## 2018-02-05 NOTE — MR AVS SNAPSHOT
After Visit Summary   2/5/2018    Mary Dixon    MRN: [id number]           Patient Information     Date Of Birth          1980        Visit Information        Provider Department      2/5/2018 9:40 AM Aldo Del Valle MD [facility]        Today's Diagnoses     Atypical chest pain           Follow-ups after your visit        Who to contact     If you have questions or need follow up information about today's clinic visit or your schedule please contact [facility] directly at [phone].  Normal or non-critical lab and imaging results will be communicated to you by MyChart, letter or phone within 4 business days after the clinic has received the results. If you do not hear from us within 7 days, please contact the clinic through [employer] or phone. If you have a critical or abnormal lab result, we will notify you by phone as soon as possible.  Submit refill requests through [employer] or call your pharmacy and they will forward the refill request to us. Please allow 3 business days for your refill to be completed.          Additional Information About Your Visit        MyChart Information     [employer] gives you secure access to your electronic health record. If you see a primary care provider, you can also send messages to your care team and make appointments. If you have questions, please call your primary care clinic.  If you do not have a primary care provider, please call [phone] and they will assist you.        Care EveryWhere ID     This is your Care EveryWhere ID. This could be used by other organizations to access your [facility] medical records  [id number]        Your Vitals Were     Pulse Temperature Respirations Last Period Pulse Oximetry BMI (Body Mass Index)    89 98.1  F (36.7  C) (Tympanic) 20 01/22/2018 98% 50.95 kg/m2       Blood Pressure from Last 3 Encounters:   02/05/18 122/82   01/16/18 124/85   01/03/18 132/88    Weight from Last 3 Encounters:    02/05/18 (!) 345 lb (156.5 kg)   01/16/18 (!) 352 lb 3.2 oz (159.8 kg)   01/03/18 (!) 352 lb (159.7 kg)              Today, you had the following     No orders found for display         Where to get your medicines      These medications [place] [facility] [place] [address]  [address], [facility] [address]     Phone:  [phone]     ranitidine 150 MG tablet          Primary Care Provider Office Phone # Fax #    Aldo Del Valle [phone]       [address]  [facility] [address]        Equal Access to Services     KINGA YOUNG : Rowena Eason, waregulo tarango, qaybta kaalmada ling, steven gnozales . So [facility] [phone].    ATENCIÓN: Si habla español, tiene a blanchard disposición servicios gratuitos de asistencia lingüística. Llame al [phone].    We comply with applicable federal civil rights laws and [place] laws. We do not discriminate on the basis of race, color, national origin, age, disability, sex, sexual orientation, or gender identity.            Thank you!     Thank you for choosing [facility]  for your care. Our goal is always to provide you with excellent care. Hearing back from our patients is one way we can continue to improve our services. Please take a few minutes to complete the written survey that you may receive in the mail after your visit with us. Thank you!             Your Updated Medication List - Protect others around you: Learn how to safely use, store and throw away your medicines at www.disposemymeds.org.          This list is accurate as of 2/5/18 10:21 AM.  Always use your most recent med list.                   Brand Name Dispense Instructions for use Diagnosis    acetaZOLAMIDE 500 MG 12 hr capsule    DIAMOX SEQUEL    6 capsule    Start one day before going to high altitude, take twice a day for 3 days total .    Altitude sickness prophylaxis       *  FLUoxetine 10 MG capsule    PROzac    14 capsule    Take 1 capsule (10 mg) by mouth daily    Anxiety       * FLUoxetine 20 MG capsule    PROzac    30 capsule    Take 1 capsule (20 mg) by mouth daily    Anxiety       lisinopril 10 MG tablet    PRINIVIL/ZESTRIL    90 tablet    Take 1 tablet (10 mg) by mouth daily    Essential hypertension       ranitidine 150 MG tablet    ZANTAC    180 tablet    Take 1 tablet (150 mg) by mouth 2 times daily    Atypical chest pain       * Notice:  This list has 2 medication(s) that are the same as other medications prescribed for you. Read the directions carefully, and ask your doctor or other care provider to review them with you.

## 2018-02-05 NOTE — NURSING NOTE
"Chief Complaint   Patient presents with     Menopausal Sx     recheck Prozac     URI     x 4 days - missed work - needs note       Initial /82  Pulse 89  Temp 98.1  F (36.7  C) (Tympanic)  Resp 20  Wt (!) 345 lb (156.5 kg)  LMP 01/22/2018  SpO2 98%  BMI 50.95 kg/m2 Estimated body mass index is 50.95 kg/(m^2) as calculated from the following:    Height as of 1/16/18: 5' 9\" (1.753 m).    Weight as of this encounter: 345 lb (156.5 kg).      Health Maintenance that is potentially due pending provider review:  NONE    n/a    Is there anyone who you would like to be able to receive your results? No  If yes have patient fill out JIM    "

## 2018-02-05 NOTE — LETTER
[facility]  [address] [facility] [address]  Phone: [phone]      February 5, 2018      RE: Mary Dixon  [address]  [facility] [address]        To whom it may concern:    Mary Dixon is under my professional care. She was seen today for illness and will return to work when symptoms improve.    Aldo Del Valle MD

## 2018-02-09 DIAGNOSIS — F41.9 ANXIETY: ICD-10-CM

## 2018-02-09 NOTE — TELEPHONE ENCOUNTER
Requested Prescriptions   Pending Prescriptions Disp Refills     FLUoxetine (PROZAC) 20 MG capsule 30 capsule 3     Sig: Take 1 capsule (20 mg) by mouth daily    There is no refill protocol information for this order        FLUoxetine (PROZAC) 20 MG capsule  Last Written Prescription Date:  01/03/2018  Last Fill Quantity: 30 capsule,  # refills: 3   Last office visit: 2/5/2018 with prescribing provider:  02/05/2018   Future Office Visit:      Hiral CAMP (R) (M)

## 2018-02-09 NOTE — TELEPHONE ENCOUNTER
"Requested Prescriptions   Pending Prescriptions Disp Refills     FLUoxetine (PROZAC) 20 MG capsule 30 capsule 3     Sig: Take 1 capsule (20 mg) by mouth daily    SSRIs Protocol Passed    2/9/2018  1:15 PM       Passed - Recent or future visit with authorizing provider    Patient had office visit in the last year or has a visit in the next 30 days with authorizing provider.  See \"Patient Info\" tab in inbasket, or \"Choose Columns\" in Meds & Orders section of the refill encounter.            Passed - Patient is age 18 or older       Passed - No active pregnancy on record       Passed - No positive pregnancy test in last 12 months        No flowsheet data found.  For PHQ score  No flowsheet data found.   For DONELL score    Hiral Wagoner RT (R) (M)          "

## 2018-04-02 PROBLEM — F41.9 ANXIETY: Status: ACTIVE | Noted: 2018-01-03

## 2018-07-27 ENCOUNTER — APPOINTMENT (OUTPATIENT)
Dept: CT IMAGING | Facility: CLINIC | Age: 38
End: 2018-07-27
Attending: FAMILY MEDICINE
Payer: COMMERCIAL

## 2018-07-27 ENCOUNTER — HOSPITAL ENCOUNTER (EMERGENCY)
Facility: CLINIC | Age: 38
Discharge: HOME | End: 2018-07-27
Attending: FAMILY MEDICINE | Admitting: FAMILY MEDICINE
Payer: COMMERCIAL

## 2018-07-27 VITALS
RESPIRATION RATE: 16 BRPM | OXYGEN SATURATION: 98 % | HEIGHT: 69 IN | DIASTOLIC BLOOD PRESSURE: 88 MMHG | HEART RATE: 83 BPM | TEMPERATURE: 98.1 F | SYSTOLIC BLOOD PRESSURE: 134 MMHG

## 2018-07-27 DIAGNOSIS — K80.20 CALCULUS OF GALLBLADDER WITHOUT CHOLECYSTITIS WITHOUT OBSTRUCTION: ICD-10-CM

## 2018-07-27 LAB
ALBUMIN SERPL-MCNC: 3.7 G/DL (ref 3.4–5)
ALBUMIN UR-MCNC: NEGATIVE MG/DL
ALP SERPL-CCNC: 146 U/L (ref 40–150)
ALT SERPL W P-5'-P-CCNC: 27 U/L (ref 0–50)
AMYLASE SERPL-CCNC: 19 U/L (ref 30–110)
ANION GAP SERPL CALCULATED.3IONS-SCNC: 5 MMOL/L (ref 3–14)
APPEARANCE UR: ABNORMAL
AST SERPL W P-5'-P-CCNC: 14 U/L (ref 0–45)
BACTERIA #/AREA URNS HPF: ABNORMAL /HPF
BASOPHILS # BLD AUTO: 0.1 10E9/L (ref 0–0.2)
BASOPHILS NFR BLD AUTO: 0.5 %
BILIRUB DIRECT SERPL-MCNC: <0.1 MG/DL (ref 0–0.2)
BILIRUB SERPL-MCNC: 0.4 MG/DL (ref 0.2–1.3)
BILIRUB UR QL STRIP: NEGATIVE
BUN SERPL-MCNC: 13 MG/DL (ref 7–30)
CALCIUM SERPL-MCNC: 9.1 MG/DL (ref 8.5–10.1)
CHLORIDE SERPL-SCNC: 107 MMOL/L (ref 94–109)
CO2 SERPL-SCNC: 27 MMOL/L (ref 20–32)
COLOR UR AUTO: YELLOW
CREAT SERPL-MCNC: 0.68 MG/DL (ref 0.52–1.04)
DIFFERENTIAL METHOD BLD: ABNORMAL
EOSINOPHIL # BLD AUTO: 0.2 10E9/L (ref 0–0.7)
EOSINOPHIL NFR BLD AUTO: 1.5 %
ERYTHROCYTE [DISTWIDTH] IN BLOOD BY AUTOMATED COUNT: 12.6 % (ref 10–15)
GFR SERPL CREATININE-BSD FRML MDRD: >90 ML/MIN/1.7M2
GLUCOSE SERPL-MCNC: 189 MG/DL (ref 70–99)
GLUCOSE UR STRIP-MCNC: NEGATIVE MG/DL
HCG UR QL: NEGATIVE
HCT VFR BLD AUTO: 48 % (ref 35–47)
HGB BLD-MCNC: 16.3 G/DL (ref 11.7–15.7)
HGB UR QL STRIP: NEGATIVE
IMM GRANULOCYTES # BLD: 0 10E9/L (ref 0–0.4)
IMM GRANULOCYTES NFR BLD: 0.4 %
KETONES UR STRIP-MCNC: NEGATIVE MG/DL
LEUKOCYTE ESTERASE UR QL STRIP: ABNORMAL
LIPASE SERPL-CCNC: 147 U/L (ref 73–393)
LYMPHOCYTES # BLD AUTO: 4.1 10E9/L (ref 0.8–5.3)
LYMPHOCYTES NFR BLD AUTO: 38.8 %
MCH RBC QN AUTO: 30.5 PG (ref 26.5–33)
MCHC RBC AUTO-ENTMCNC: 34 G/DL (ref 31.5–36.5)
MCV RBC AUTO: 90 FL (ref 78–100)
MONOCYTES # BLD AUTO: 0.8 10E9/L (ref 0–1.3)
MONOCYTES NFR BLD AUTO: 7.6 %
MUCOUS THREADS #/AREA URNS LPF: PRESENT /LPF
NEUTROPHILS # BLD AUTO: 5.5 10E9/L (ref 1.6–8.3)
NEUTROPHILS NFR BLD AUTO: 51.2 %
NITRATE UR QL: NEGATIVE
NRBC # BLD AUTO: 0 10*3/UL
NRBC BLD AUTO-RTO: 0 /100
PH UR STRIP: 5 PH (ref 5–7)
PLATELET # BLD AUTO: 361 10E9/L (ref 150–450)
POTASSIUM SERPL-SCNC: 3.7 MMOL/L (ref 3.4–5.3)
PROT SERPL-MCNC: 8.1 G/DL (ref 6.8–8.8)
RBC # BLD AUTO: 5.34 10E12/L (ref 3.8–5.2)
RBC #/AREA URNS AUTO: 2 /HPF (ref 0–2)
SODIUM SERPL-SCNC: 139 MMOL/L (ref 133–144)
SOURCE: ABNORMAL
SP GR UR STRIP: 1.03 (ref 1–1.03)
SQUAMOUS #/AREA URNS AUTO: 7 /HPF (ref 0–1)
UROBILINOGEN UR STRIP-MCNC: 2 MG/DL (ref 0–2)
WBC # BLD AUTO: 10.6 10E9/L (ref 4–11)
WBC #/AREA URNS AUTO: 12 /HPF (ref 0–5)

## 2018-07-27 PROCEDURE — 99285 EMERGENCY DEPT VISIT HI MDM: CPT | Mod: 25 | Performed by: FAMILY MEDICINE

## 2018-07-27 PROCEDURE — 82150 ASSAY OF AMYLASE: CPT | Performed by: FAMILY MEDICINE

## 2018-07-27 PROCEDURE — 85025 COMPLETE CBC W/AUTO DIFF WBC: CPT | Performed by: FAMILY MEDICINE

## 2018-07-27 PROCEDURE — 81001 URINALYSIS AUTO W/SCOPE: CPT | Performed by: FAMILY MEDICINE

## 2018-07-27 PROCEDURE — 96374 THER/PROPH/DIAG INJ IV PUSH: CPT | Performed by: FAMILY MEDICINE

## 2018-07-27 PROCEDURE — 83690 ASSAY OF LIPASE: CPT | Performed by: FAMILY MEDICINE

## 2018-07-27 PROCEDURE — 96375 TX/PRO/DX INJ NEW DRUG ADDON: CPT | Performed by: FAMILY MEDICINE

## 2018-07-27 PROCEDURE — 80076 HEPATIC FUNCTION PANEL: CPT | Performed by: FAMILY MEDICINE

## 2018-07-27 PROCEDURE — 76705 ECHO EXAM OF ABDOMEN: CPT | Performed by: FAMILY MEDICINE

## 2018-07-27 PROCEDURE — 74176 CT ABD & PELVIS W/O CONTRAST: CPT

## 2018-07-27 PROCEDURE — 25000128 H RX IP 250 OP 636: Performed by: FAMILY MEDICINE

## 2018-07-27 PROCEDURE — 80048 BASIC METABOLIC PNL TOTAL CA: CPT | Performed by: FAMILY MEDICINE

## 2018-07-27 PROCEDURE — 81025 URINE PREGNANCY TEST: CPT | Performed by: FAMILY MEDICINE

## 2018-07-27 PROCEDURE — 76705 ECHO EXAM OF ABDOMEN: CPT | Mod: 26 | Performed by: FAMILY MEDICINE

## 2018-07-27 RX ORDER — KETOROLAC TROMETHAMINE 15 MG/ML
15 INJECTION, SOLUTION INTRAMUSCULAR; INTRAVENOUS ONCE
Status: COMPLETED | OUTPATIENT
Start: 2018-07-27 | End: 2018-07-27

## 2018-07-27 RX ORDER — ONDANSETRON 2 MG/ML
4 INJECTION INTRAMUSCULAR; INTRAVENOUS
Status: DISCONTINUED | OUTPATIENT
Start: 2018-07-27 | End: 2018-07-27 | Stop reason: HOSPADM

## 2018-07-27 RX ORDER — HYDROCODONE BITARTRATE AND ACETAMINOPHEN 5; 325 MG/1; MG/1
1-2 TABLET ORAL EVERY 6 HOURS PRN
Qty: 12 TABLET | Refills: 0 | Status: SHIPPED | OUTPATIENT
Start: 2018-07-27 | End: 2018-08-06

## 2018-07-27 RX ADMIN — KETOROLAC TROMETHAMINE 15 MG: 15 INJECTION, SOLUTION INTRAMUSCULAR; INTRAVENOUS at 17:34

## 2018-07-27 RX ADMIN — ONDANSETRON 4 MG: 2 INJECTION INTRAMUSCULAR; INTRAVENOUS at 17:32

## 2018-07-27 NOTE — ED AVS SNAPSHOT
[facility] Emergency Department    [address] [facility] [address]    Phone:  [phone]    Fax:  [phone]                                       Mary Dixon   MRN: [id number]    Department:  [facility] Emergency Department   Date of Visit:  7/27/2018           After Visit Summary Signature Page     I have received my discharge instructions, and my questions have been answered. I have discussed any challenges I see with this plan with the nurse or doctor.    ..........................................................................................................................................  Patient/Patient Representative Signature      ..........................................................................................................................................  Patient Representative Print Name and Relationship to Patient    ..................................................               ................................................  Date                                            Time    ..........................................................................................................................................  Reviewed by Signature/Title    ...................................................              ..............................................  Date                                                            Time

## 2018-07-27 NOTE — ED PROVIDER NOTES
HPI  Patient is a 38-year-old female presenting with right upper quadrant abdominal pain and nausea.  She has a known history of morbid obesity and hirsutism.  She has had an oophorectomy for ovarian cancer.  She has had an appendectomy.  She takes lisinopril and Prozac.  She smokes.  No drugs.  No alcohol.    The patient began to experience abdominal pain starting at about 10:00 AM this morning.  The pain is been constant since onset.  It is been waxing and waning.  No obvious exacerbating or relieving factors.  She has been nauseous throughout the day.  No vomiting.  Her pain currently is 5/10.  No radiating symptoms into the chest though she does have some mid back pain on the right side.  She denies having had similar symptoms previously.  No dysuria, urgency, or frequency.  No vaginal discharge or irritation.  No trauma or injury.  No chest pain.  No cough or fever.  She does report some soft, pale stool today but denies diarrhea or constipation.    ROS: All other review of systems are negative other than that noted above.     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7  C) (Temporal)  Resp 16  Ht 1.753 m (5' 9\")  SpO2 98%  General: Patient is alert and in moderate distress.  Conversational.  Pleasant.  Neurological: Alert.  Moving upper and lower extremities equally, bilaterally.  Head / Neck: Atraumatic.  Ears: Not done.  Eyes: Pupils are equal, round, and reactive.  Normal conjunctiva.  Nose: Midline.  No epistaxis.  Mouth / Throat: No ulcerations or lesions.  Upper pharynx is not erythematous.  Moist.  Respiratory: No respiratory distress. CTA B.  Cardiovascular: Regular rhythm.  Peripheral extremities are warm.  No edema.  No calf tenderness.  Abdomen / Pelvis: Tender in the RUQ.  No distention.  Soft throughout.  Genitalia: Not done.  Musculoskeletal: No tenderness over major muscles and joints.  Skin: No evidence of rash or trauma.        ED COURSE  1719.  Patient presents with abdominal pain and nausea.  White blood cells are positive in the urine.  Her white blood cell count is normal.  Hepatic panel is still pending.  Right upper quadrant ultrasound at the bedside will be documented below.    Labs Ordered and Resulted from Time of ED Arrival Up to the Time of Departure from the ED   ROUTINE UA WITH MICROSCOPIC - Abnormal; Notable for the following:        Result Value    Leukocyte Esterase Urine Trace (*)     WBC Urine 12 (*)     Bacteria Urine Few (*)     Squamous Epithelial /HPF Urine 7 (*)     Mucous Urine Present (*)     All other components within normal limits   CBC WITH PLATELETS DIFFERENTIAL - Abnormal; Notable for the following:     RBC Count 5.34 (*)     Hemoglobin 16.3 (*)     Hematocrit 48.0 (*)     All other components within normal limits   BASIC METABOLIC PANEL - Abnormal; Notable for the following:     Glucose 189 (*)     All other components within normal limits   AMYLASE - Abnormal; Notable for the following:     Amylase 19 (*)     All other components within normal limits "   HCG QUALITATIVE URINE   HEPATIC PANEL   LIPASE   PERIPHERAL IV CATHETER     IMAGING  Images reviewed by me.  Radiology report also reviewed.  Abd/pelvis CT - no contrast - Stone Protocol   Preliminary Result   IMPRESSION:    1. Cholelithiasis. If there is clinical suspicion for cholecystitis,   gallbladder ultrasound would be recommended for further evaluation.   2. No urinary calculi or evidence for urinary obstruction.       POC US ABDOMEN LIMITED   Final Result   [facility] Procedure Note        Limited Bedside ED Gallbladder  Ultrasound:      PROCEDURE: PERFORMED BY: Dr. Steve Diaz   INDICATIONS:  RUQ/Epigastric Pain   PROBE:  Low frequency convex probe   BODY LOCATION: Abdomen   FINDINGS:   An ultrasound of the gallbladder was performed using longitudinal and transverse views.   Gallstone(s):  Present   Gallbladder sludge:  Absent   Sonographic Benavides's sign:  Absent   Gallbladder wall thickening (greater than 4 mm):  Absent   Pericholecystic fluid: Absent   Common bile duct (dilated if internal diameter greater than 6 mm): Unable to visualize    INTERPRETATION: Patient has a possible gallstone at the opening of the gallbladder.  However, there is no evidence of inflammation around the gallbladder and the wall is not thickened.   IMAGE DOCUMENTATION: Images were archived to hard drive.           1920.  Patient has persistent pain.  She feels it more in her right mid back and flank now.  She has pyuria but no urinary symptoms.  She does not have hematuria.  She has minimal tenderness but I can elicit nausea with palpation and discomfort.  Her hepatic panel and pancreas appear to be normal.  There was a possible stone at the neck of the gallbladder but no evidence of inflammatory change and no tenderness over the gallbladder.  CT scan without contrast pending.    2058.  Patient has a CT scan that shows cholelithiasis, as expected.  No evidence for cholecystitis.  No other obvious cause of her pain  is identified.  Reviewed the case with the on-call general surgeon, Dr. Berry.  He recommended the patient be discharged home if her pain could be treated appropriately with oral medication.  He recommended she follow-up in the clinic early next week for reevaluation.  I presented this to the patient and she is agreeable to go home.  I did suggest that her pain may in fact worsen with time as the stone appeared to be immobile and impacted within the gallbladder neck.  I did recommend strongly that she come back here if her pain worsens, she begins to throw up and is dehydrated, or she has a fever.  I did provide a prescription of hydrocodone/acetaminophen, #12 tablets.    Medications   ondansetron (ZOFRAN) injection 4 mg (4 mg Intravenous Given 7/27/18 1732)   ketorolac (TORADOL) injection 15 mg (15 mg Intravenous Given 7/27/18 1734)       IMPRESSION    ICD-10-CM    1. Calculus of gallbladder without cholecystitis without obstruction K80.20            Critical Care time:  none                    Steve Diaz MD  07/27/18 2100

## 2018-07-27 NOTE — ED AVS SNAPSHOT
[facility]    [address] [facility] [address]    Phone:  [phone]    Fax:  [phone]                                       Mary Dixon   MRN: [id number]    Department:  [facility]   Date of Visit:  7/27/2018           Patient Information     Date Of Birth          1980        Your diagnoses for this visit were:     Calculus of gallbladder without cholecystitis without obstruction        You were seen by Steve Diaz MD.      Follow-up Information     Follow up with [facility].    Specialty:  Surgery    Contact information:    [address] [place] [address] [phone]    Additional information:    The medical center is located at   [address] [place] [address] (between [address] in [place], [place] miles north   of [place]).        Discharge Instructions       Return to the Emergency Room if the following occurs:     Worsened pain, fever >101, vomiting/dehydration, or for any concern at anytime.    Or, follow-up with the following provider as we discussed:     Return to the general surgery clinic next week for reevaluation.    Medications discussed:    Ibuprofen 600 mg every six hours for pain (7 days duration).  Tylenol 1000 mg every six hours for pain (7 days duration).  Therefore, you can alternate these every three hours and do it safely.  Norco (5/325) 1-2 tabs every 6 hours for pain.  Note, each tab has 325 mg tylenol.  Do not exceed 4000 mg tylenol per 24 hours.    If you received pain-relieving or sedating medication during your time in the ER, avoid alcohol, driving automobiles, or working with machinery.  Also, a responsible adult must stay with you.        Call the Nurse Advice Line at [phone] or [phone] for any concern at anytime.      24 Hour Appointment Hotline       To make an appointment at any [facility], call [phone] ([phone]). If you don't  have a family doctor or clinic, we will help you find one. [facility] are conveniently located to serve the needs of you and your family.             Review of your medicines      START taking        Dose / Directions Last dose taken    HYDROcodone-acetaminophen 5-325 MG per tablet   Commonly known as:  NORCO   Dose:  1-2 tablet   Quantity:  12 tablet        Take 1-2 tablets by mouth every 6 hours as needed for pain maximum 10 tablet(s) per day   Refills:  0          Our records show that you are taking the medicines listed below. If these are incorrect, please call your family doctor or clinic.        Dose / Directions Last dose taken    FLUoxetine 20 MG capsule   Commonly known as:  PROzac   Dose:  20 mg   Quantity:  90 capsule        Take 1 capsule (20 mg) by mouth daily   Refills:  1        lisinopril 10 MG tablet   Commonly known as:  PRINIVIL/ZESTRIL   Dose:  10 mg   Quantity:  90 tablet        Take 1 tablet (10 mg) by mouth daily   Refills:  3        ranitidine 150 MG tablet   Commonly known as:  ZANTAC   Dose:  150 mg   Quantity:  180 tablet        Take 1 tablet (150 mg) by mouth 2 times daily   Refills:  3        TYLENOL PO   Dose:  500 mg        Take 500 mg by mouth every 8 hours as needed for mild pain or fever   Refills:  0                Information about OPIOIDS     PRESCRIPTION OPIOIDS: WHAT YOU NEED TO KNOW   We gave you an opioid (narcotic) pain medicine. It is important to manage your pain, but opioids are not always the best choice. You should first try all the other options your care team gave you. Take this medicine for as short a time (and as few doses) as possible.     These medicines have risks:    DO NOT drive when on new or higher doses of pain medicine. These medicines can affect your alertness and reaction times, and you could be arrested for driving under the influence (DUI). If you need to use opioids long-term, talk to your care team about driving.    DO NOT operate heave  machinery    DO NOT do any other dangerous activities while taking these medicines.     DO NOT drink any alcohol while taking these medicines.      If the opioid prescribed includes acetaminophen, DO NOT take with any other medicines that contain acetaminophen. Read all labels carefully. Look for the word  acetaminophen  or  Tylenol.  Ask your pharmacist if you have questions or are unsure.    You can get addicted to pain medicines, especially if you have a history of addiction (chemical, alcohol or substance dependence). Talk to your care team about ways to reduce this risk.    Store your pills in a secure place, locked if possible. We will not replace any lost or stolen medicine. If you don t finish your medicine, please throw away (dispose) as directed by your pharmacist. The [place] Pollution Control Agency has more information about safe disposal: https://www.pca.[facility].mn.us/living-green/managing-unwanted-medications.     All opioids tend to cause constipation. Drink plenty of water and eat foods that have a lot of fiber, such as fruits, vegetables, prune juice, apple juice and high-fiber cereal. Take a laxative (Miralax, milk of magnesia, Colace, Senna) if you don t move your bowels at least every other day.         Prescriptions were sent or printed at these locations (1 Prescription)                   [facility] [address]   [address] [facility] [address]    Telephone:  [phone]   Fax:  [phone]   Hours:                  Printed at Department/Unit printer (1 of 1)         HYDROcodone-acetaminophen (NORCO) 5-325 MG per tablet                Procedures and tests performed during your visit     Abd/pelvis CT - no contrast - Stone Protocol    Amylase    Basic metabolic panel    CBC with platelets differential    HCG qualitative urine    Hepatic panel    Lipase    POC US ABDOMEN LIMITED    Peripheral IV: Standard    Routine UA with microscopic      Orders  Needing Specimen Collection     None      Pending Results     Date and Time Order Name Status Description    7/27/2018 1920 Abd/pelvis CT - no contrast - Stone Protocol Preliminary             Pending Culture Results     No orders found from 7/25/2018 to 7/28/2018.            Pending Results Instructions     If you had any lab results that were not finalized at the time of your Discharge, you can call the ED Lab Result RN at [phone]. You will be contacted by this team for any positive Lab results or changes in treatment. The nurses are available 7 days a week from 10A to 6:30P.  You can leave a message 24 hours per day and they will return your call.        Test Results From Your Hospital Stay        7/27/2018  4:48 PM      Component Results     Component Value Ref Range & Units Status    Color Urine Yellow  Final    Appearance Urine Cloudy  Final    Glucose Urine Negative NEG^Negative mg/dL Final    Bilirubin Urine Negative NEG^Negative Final    Ketones Urine Negative NEG^Negative mg/dL Final    Specific Gravity Urine 1.028 1.003 - 1.035 Final    Blood Urine Negative NEG^Negative Final    pH Urine 5.0 5.0 - 7.0 pH Final    Protein Albumin Urine Negative NEG^Negative mg/dL Final    Urobilinogen mg/dL 2.0 0.0 - 2.0 mg/dL Final    Nitrite Urine Negative NEG^Negative Final    Leukocyte Esterase Urine Trace (A) NEG^Negative Final    Source Midstream Urine  Final    WBC Urine 12 (H) 0 - 5 /HPF Final    RBC Urine 2 0 - 2 /HPF Final    Bacteria Urine Few (A) NEG^Negative /HPF Final    Squamous Epithelial /HPF Urine 7 (H) 0 - 1 /HPF Final    Mucous Urine Present (A) NEG^Negative /LPF Final         7/27/2018  4:46 PM      Component Results     Component Value Ref Range & Units Status    HCG Qual Urine Negative NEG^Negative Final    This test is for screening purposes.  Results should be interpreted along with   the clinical picture.  Confirmation testing is available if warranted by   ordering GUJ359, HCG Quantitative  Pregnancy.           7/27/2018  4:53 PM      Component Results     Component Value Ref Range & Units Status    WBC 10.6 4.0 - 11.0 10e9/L Final    RBC Count 5.34 (H) 3.8 - 5.2 10e12/L Final    Hemoglobin 16.3 (H) 11.7 - 15.7 g/dL Final    Hematocrit 48.0 (H) 35.0 - 47.0 % Final    MCV 90 78 - 100 fl Final    MCH 30.5 26.5 - 33.0 pg Final    MCHC 34.0 31.5 - 36.5 g/dL Final    RDW 12.6 10.0 - 15.0 % Final    Platelet Count 361 150 - 450 10e9/L Final    Diff Method Automated Method  Final    % Neutrophils 51.2 % Final    % Lymphocytes 38.8 % Final    % Monocytes 7.6 % Final    % Eosinophils 1.5 % Final    % Basophils 0.5 % Final    % Immature Granulocytes 0.4 % Final    Nucleated RBCs 0 0 /100 Final    Absolute Neutrophil 5.5 1.6 - 8.3 10e9/L Final    Absolute Lymphocytes 4.1 0.8 - 5.3 10e9/L Final    Absolute Monocytes 0.8 0.0 - 1.3 10e9/L Final    Absolute Eosinophils 0.2 0.0 - 0.7 10e9/L Final    Absolute Basophils 0.1 0.0 - 0.2 10e9/L Final    Abs Immature Granulocytes 0.0 0 - 0.4 10e9/L Final    Absolute Nucleated RBC 0.0  Final         7/27/2018  5:12 PM      Component Results     Component Value Ref Range & Units Status    Sodium 139 133 - 144 mmol/L Final    Potassium 3.7 3.4 - 5.3 mmol/L Final    Specimen slightly hemolyzed, potassium may be falsely elevated    Chloride 107 94 - 109 mmol/L Final    Carbon Dioxide 27 20 - 32 mmol/L Final    Anion Gap 5 3 - 14 mmol/L Final    Glucose 189 (H) 70 - 99 mg/dL Final    Urea Nitrogen 13 7 - 30 mg/dL Final    Creatinine 0.68 0.52 - 1.04 mg/dL Final    GFR Estimate >90 >60 mL/min/1.7m2 Final    Non  GFR Calc    GFR Estimate If Black >90 >60 mL/min/1.7m2 Final    African American GFR Calc    Calcium 9.1 8.5 - 10.1 mg/dL Final         7/27/2018  5:29 PM      Impression     [facility] Procedure Note      Limited Bedside ED Gallbladder  Ultrasound:    PROCEDURE: PERFORMED BY: Dr. Steve Diaz  INDICATIONS:  RUQ/Epigastric Pain  PROBE:  Low  frequency convex probe  BODY LOCATION: Abdomen  FINDINGS:   An ultrasound of the gallbladder was performed using longitudinal and transverse views.  Gallstone(s):  Present  Gallbladder sludge:  Absent  Sonographic Benavides's sign:  Absent  Gallbladder wall thickening (greater than 4 mm):  Absent  Pericholecystic fluid: Absent  Common bile duct (dilated if internal diameter greater than 6 mm): Unable to visualize   INTERPRETATION: Patient has a possible gallstone at the opening of the gallbladder.  However, there is no evidence of inflammation around the gallbladder and the wall is not thickened.  IMAGE DOCUMENTATION: Images were archived to hard drive.           7/27/2018  6:17 PM      Component Results     Component Value Ref Range & Units Status    Bilirubin Direct <0.1 0.0 - 0.2 mg/dL Final    Bilirubin Total 0.4 0.2 - 1.3 mg/dL Final    Albumin 3.7 3.4 - 5.0 g/dL Final    Protein Total 8.1 6.8 - 8.8 g/dL Final    Alkaline Phosphatase 146 40 - 150 U/L Final    ALT 27 0 - 50 U/L Final    AST 14 0 - 45 U/L Final         7/27/2018  6:10 PM      Component Results     Component Value Ref Range & Units Status    Lipase 147 73 - 393 U/L Final         7/27/2018  6:10 PM      Component Results     Component Value Ref Range & Units Status    Amylase 19 (L) 30 - 110 U/L Final         7/27/2018  8:06 PM      Narrative     CT ABDOMEN PELVIS WITHOUT CONTRAST   7/27/2018 7:51 PM     HISTORY: Right flank pain. Nausea.    TECHNIQUE: Volumetric helical sections were acquired from the lung  bases through the ischial tuberosities without IV contrast. Coronal  images were also reconstructed. Radiation dose for this scan was  reduced using automated exposure control, adjustment of the mA and/or  kV according to patient size, or iterative reconstruction technique.    COMPARISON: CT of the abdomen and pelvis performed 10/21/2008.    FINDINGS: No urinary calculi or hydronephrosis. Prominent gallstone  within the gallbladder measures 2.8  cm. The liver, spleen, adrenal  glands, pancreas, and kidneys have unremarkable noncontrast  appearances. No bowel obstruction. No evidence for colitis or  diverticulitis. No free fluid in the pelvis. The visualized lung bases  are clear. 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        Impression     IMPRESSION:   1. Cholelithiasis. If there is clinical suspicion for cholecystitis,  gallbladder ultrasound would be recommended for further evaluation.  2. No urinary calculi or evidence for urinary obstruction.                 Thank you for choosing [facility]       Thank you for choosing [facility] for your care. Our goal is always to provide you with excellent care. Hearing back from our patients is one way we can continue to improve our services. Please take a few minutes to complete the written survey that you may receive in the mail after you visit with us. Thank you!        [employer] App Information     [employer] gives you secure access to your electronic health record. If you see a primary care provider, you can also send messages to your care team and make appointments. If you have questions, please call your primary care clinic.  If you do not have a primary care provider, please call [phone] and they will assist you.        Care EveryWhere ID     This is your Care EveryWhere ID. This could be used by other organizations to access your [facility] medical records  [id number]        Equal Access to Services     KINGA YOUNG : oRwena Eason, carlitos tarango, adrián dubon, steven serrato. So [facility] [phone].    ATENCIÓN: Si habla español, tiene a blanchard disposición servicios gratuitos de asistencia lingüística. Llame al [phone].    We comply with applicable federal civil rights laws and [place] laws. We do not discriminate on the basis of race, color, national origin, age, disability, sex, sexual orientation, or gender identity.            After  Visit Summary       This is your record. Keep this with you and show to your community pharmacist(s) and doctor(s) at your next visit.

## 2018-07-28 NOTE — DISCHARGE INSTRUCTIONS
Return to the Emergency Room if the following occurs:     Worsened pain, fever >101, vomiting/dehydration, or for any concern at anytime.    Or, follow-up with the following provider as we discussed:     Return to the general surgery clinic next week for reevaluation.    Medications discussed:    Ibuprofen 600 mg every six hours for pain (7 days duration).  Tylenol 1000 mg every six hours for pain (7 days duration).  Therefore, you can alternate these every three hours and do it safely.  Norco (5/325) 1-2 tabs every 6 hours for pain.  Note, each tab has 325 mg tylenol.  Do not exceed 4000 mg tylenol per 24 hours.    If you received pain-relieving or sedating medication during your time in the ER, avoid alcohol, driving automobiles, or working with machinery.  Also, a responsible adult must stay with you.        Call the Nurse Advice Line at [phone] or [phone] for any concern at anytime.

## 2018-07-30 ENCOUNTER — OFFICE VISIT (OUTPATIENT)
Dept: FAMILY MEDICINE | Facility: CLINIC | Age: 38
End: 2018-07-30
Payer: COMMERCIAL

## 2018-07-30 ENCOUNTER — TELEPHONE (OUTPATIENT)
Dept: SURGERY | Facility: CLINIC | Age: 38
End: 2018-07-30

## 2018-07-30 ENCOUNTER — OFFICE VISIT (OUTPATIENT)
Dept: SURGERY | Facility: CLINIC | Age: 38
End: 2018-07-30
Payer: COMMERCIAL

## 2018-07-30 VITALS
HEIGHT: 69 IN | RESPIRATION RATE: 16 BRPM | DIASTOLIC BLOOD PRESSURE: 86 MMHG | HEART RATE: 98 BPM | SYSTOLIC BLOOD PRESSURE: 125 MMHG | TEMPERATURE: 97.1 F | BODY MASS INDEX: 43.4 KG/M2 | WEIGHT: 293 LBS

## 2018-07-30 VITALS
DIASTOLIC BLOOD PRESSURE: 86 MMHG | SYSTOLIC BLOOD PRESSURE: 125 MMHG | OXYGEN SATURATION: 96 % | HEIGHT: 69 IN | WEIGHT: 293 LBS | TEMPERATURE: 97.1 F | BODY MASS INDEX: 43.4 KG/M2 | HEART RATE: 83 BPM

## 2018-07-30 DIAGNOSIS — K80.20 GALLSTONES: ICD-10-CM

## 2018-07-30 DIAGNOSIS — K80.20 GALLSTONES: Primary | ICD-10-CM

## 2018-07-30 DIAGNOSIS — Z01.818 PREOP GENERAL PHYSICAL EXAM: Primary | ICD-10-CM

## 2018-07-30 PROBLEM — R45.86 MOOD SWINGS: Status: RESOLVED | Noted: 2018-01-03 | Resolved: 2018-07-30

## 2018-07-30 PROBLEM — F41.9 ANXIETY: Status: RESOLVED | Noted: 2018-01-03 | Resolved: 2018-07-30

## 2018-07-30 PROCEDURE — 99204 OFFICE O/P NEW MOD 45 MIN: CPT | Performed by: SURGERY

## 2018-07-30 PROCEDURE — 99214 OFFICE O/P EST MOD 30 MIN: CPT | Performed by: PHYSICIAN ASSISTANT

## 2018-07-30 NOTE — MR AVS SNAPSHOT
After Visit Summary   7/30/2018    Mary Dixon    MRN: [id number]           Patient Information     Date Of Birth          1980        Visit Information        Provider Department      7/30/2018 3:00 PM Gilbert Garcia MD [facility]        Today's Diagnoses     Gallstones    -  1       Follow-ups after your visit        Your next 10 appointments already scheduled     Jul 30, 2018  3:00 PM CDT   [facility] Visit with [facility] Garcia MD   [facility]    [address] [facility] [id number] [phone]              Who to contact     If you have questions or need follow up information about today's clinic visit or your schedule please contact [facility] directly at [phone].  Normal or non-critical lab and imaging results will be communicated to you by MyChart, letter or phone within 4 business days after the clinic has received the results. If you do not hear from us within 7 days, please contact the clinic through MyChart or phone. If you have a critical or abnormal lab result, we will notify you by phone as soon as possible.  Submit refill requests through [employer] or call your pharmacy and they will forward the refill request to us. Please allow 3 business days for your refill to be completed.          Additional Information About Your Visit        MyChart Information     [employer] gives you secure access to your electronic health record. If you see a primary care provider, you can also send messages to your care team and make appointments. If you have questions, please call your primary care clinic.  If you do not have a primary care provider, please call [phone] and they will assist you.        Care EveryWhere ID     This is your Care EveryWhere ID. This could be used by other organizations to access your [facility] medical records  [id number]        Your Vitals Were     Pulse Temperature Respirations Height BMI  "(Body Mass Index)       98 97.1  F (36.2  C) (Tympanic) 16 1.753 m (5' 9\") 50.95 kg/m2        Blood Pressure from Last 3 Encounters:   07/30/18 125/86   07/27/18 134/88   02/05/18 122/82    Weight from Last 3 Encounters:   07/30/18 (!) 156.5 kg (345 lb)   02/05/18 (!) 156.5 kg (345 lb)   01/16/18 (!) 159.8 kg (352 lb 3.2 oz)              We Performed the Following     Gena-Operative Worksheet        Primary Care Provider Office Phone # Fax #    Aldo Del Valle [phone] [address] [place] [address]        Equal Access to Services     KINGA YOUNG : Rowena patel Sodaina, waaxda luqadaha, qaybta kaalmada adeegyaelena, steven gonzales . So [facility] [phone].    ATENCIÓN: Si habla español, tiene a blanchard disposición servicios gratuitos de asistencia lingüística. Ll[facility] [phone].    We comply with applicable federal civil rights laws and [place] laws. We do not discriminate on the basis of race, color, national origin, age, disability, sex, sexual orientation, or gender identity.            Thank you!     Thank you for choosing [facility]  for your care. Our goal is always to provide you with excellent care. Hearing back from our patients is one way we can continue to improve our services. Please take a few minutes to complete the written survey that you may receive in the mail after your visit with us. Thank you!             Your Updated Medication List - Protect others around you: Learn how to safely use, store and throw away your medicines at www.disposemymeds.org.          This list is accurate as of 7/30/18  9:38 AM.  Always use your most recent med list.                   Brand Name Dispense Instructions for use Diagnosis    HYDROcodone-acetaminophen 5-325 MG per tablet    NORCO    12 tablet    Take 1-2 tablets by mouth every 6 hours as needed for pain maximum 10 tablet(s) per day        lisinopril 10 MG tablet    " PRINIVIL/ZESTRIL    90 tablet    Take 1 tablet (10 mg) by mouth daily    Essential hypertension       ranitidine 150 MG tablet    ZANTAC    180 tablet    Take 1 tablet (150 mg) by mouth 2 times daily    Atypical chest pain       TYLENOL PO      Take 500 mg by mouth every 8 hours as needed for mild pain or fever

## 2018-07-30 NOTE — TELEPHONE ENCOUNTER
Type of surgery: Laparoscopic cholecystectomy with intraoperative cholangiography  [place] [facility]  Date and time of surgery: August 1, 2018 at 10:30  Surgeon: Dr. Garcia  Pre-Op Appt Date: 7/30/18 at 2:20 pm  Post-Op Appt Date: 8/13/2018 at 13:00   Packet sent out: Yes  Pre-cert/Authorization completed:  Not Applicable  Date: 7/30/2018    Esa HAMMOND CMA

## 2018-07-30 NOTE — PROGRESS NOTES
[facility]  [address] [facility] [address]  [phone]  Dept: [phone]    PRE-OP EVALUATION:  Today's date: 2018    Mary Dixon (: 1980) presents for pre-operative evaluation assessment as requested by Dr. Garcia. He requires evaluation and anesthesia risk assessment prior to undergoing surgery/procedure for treatment of cholelithiasis .    Proposed Surgery/ Procedure: LAPAROSCOPIC CHOLECYSTECTOMY WITH CHOLANGIOGRAMS  Date of Surgery/ Procedure: 18  Time of Surgery/ Procedure: 10:30 am   Hospital/Surgical Facility: [facility]  Primary Physician: Aldo Del Valle  Type of Anesthesia Anticipated: General    Patient has a Health Care Directive or Living Will:  YES    1. NO - Do you have a history of heart attack, stroke, stent, bypass or surgery on an artery in the head, neck, heart or legs?  2. NO - Do you ever have any pain or discomfort in your chest?  3. NO - Do you have a history of  Heart Failure?  4. NO - Are you troubled by shortness of breath when: walking on the level, up a slight hill or at night?  5. NO - Do you currently have a cold, bronchitis or other respiratory infection?  6. NO - Do you have a cough, shortness of breath or wheezing?  7. NO - Do you sometimes get pains in the calves of your legs when you walk?  8. NO - Do you or anyone in your family have previous history of blood clots?  9. NO - Do you or does anyone in your family have a serious bleeding problem such as prolonged bleeding following surgeries or cuts?  10. NO - Have you ever had problems with anemia or been told to take iron pills?  11. NO - Have you had any abnormal blood loss such as black, tarry or bloody stools, or abnormal vaginal bleeding?  12. NO - Have you ever had a blood transfusion?  13. NO - Have you or any of your relatives ever had problems with anesthesia?  14. YES - Do you have sleep apnea, excessive snoring or daytime drowsiness? Sleep apnea, has  CPAP  15. NO - Do you have any prosthetic heart valves?  16. NO - Do you have prosthetic joints?  17. NO - Is there any chance that you may be pregnant?      HPI:     HPI related to upcoming procedure: 2.8cm stone       See problem list for active medical problems.  Problems all longstanding and stable, except as noted/documented.  See ROS for pertinent symptoms related to these conditions.                                                                                                                                                          .    MEDICAL HISTORY:     Patient Active Problem List    Diagnosis Date Noted     Atypical chest pain 01/16/2018     Priority: Medium     Being attributed to anxiety       Hirsutism 01/16/2018     Priority: Medium     Mood swings (H) 01/03/2018     Priority: Medium     Morbid obesity (H) 01/03/2018     Priority: Medium     Anxiety 01/03/2018     Priority: Medium     Prediabetes 02/03/2013     Priority: Medium     PMB (postmenopausal bleeding) 12/26/2012     Priority: Medium     HTN (hypertension) 05/18/2012     Priority: Medium     Personal history of ovarian cancer 05/16/2012     Priority: Medium     Last visit 12/26/12 Dr. Fowler  1.) Patient is aware that she is due her EMBx, but declines general exam today due to flu like symptoms. We will therefore wait to do this at her next surveillance visit.    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 3/08  Dr. Petra Kaminski is gyn/onc - at [facility]  Did not remove uterus    Per pt report, she has \"lifting restrictions\" and hasn't been able to go back to work. Says her ob/gyn has been filling out the paperwork. She says she has seen PT.    Transdermal estrogen 100mcg patch - saw repro med - Dr. Faria - see scanned documents    Most recent visit with Dr. Fowler 11/09 - pt is off hormones and is to do withdrawal bleeds 4x/year. Has progesterone for this.   Sent to chiropractor for therapy        Benign intracranial hypertension 12/05/2006     Priority: Medium     Diagnosed in 2005  Not on diamox.  Has seen neuro        Past Medical History:   Diagnosis Date     Benign intracranial hypertension      Mononucleosis      Ovarian cancer (H)     borderline ovarian cancer     Pneumonia      Post-menopausal bleeding      Past Surgical History:   Procedure Laterality Date     APPENDECTOMY  3/6/08     SURGICAL HISTORY OF -   3/6/08    EUA, 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      Current Outpatient Prescriptions   Medication Sig Dispense Refill     Acetaminophen (TYLENOL PO) Take 500 mg by mouth every 8 hours as needed for mild pain or fever       HYDROcodone-acetaminophen (NORCO) 5-325 MG per tablet Take 1-2 tablets by mouth every 6 hours as needed for pain maximum 10 tablet(s) per day (Patient not taking: Reported on 7/30/2018) 12 tablet 0     lisinopril (PRINIVIL/ZESTRIL) 10 MG tablet Take 1 tablet (10 mg) by mouth daily 90 tablet 3     ranitidine (ZANTAC) 150 MG tablet Take 1 tablet (150 mg) by mouth 2 times daily (Patient not taking: Reported on 7/30/2018) 180 tablet 3     OTC products: None, except as noted above    No Known Allergies   Latex Allergy: NO    Social History   Substance Use Topics     Smoking status: Current Every Day Smoker     Packs/day: 0.50     Years: 2.00     Types: Cigarettes     Smokeless tobacco: Never Used     Alcohol use No " "    History   Drug Use No       REVIEW OF SYSTEMS:   CONSTITUTIONAL: NEGATIVE for fever, chills, change in weight  INTEGUMENTARY/SKIN: NEGATIVE for worrisome rashes, moles or lesions  EYES: NEGATIVE for vision changes or irritation  ENT/MOUTH: NEGATIVE for ear, mouth and throat problems  RESP: NEGATIVE for significant cough or SOB  BREAST: NEGATIVE for masses, tenderness or discharge  CV: NEGATIVE for chest pain, palpitations or peripheral edema  GI: NEGATIVE for nausea, abdominal pain, heartburn, or change in bowel habits  : NEGATIVE for frequency, dysuria, or hematuria  MUSCULOSKELETAL: NEGATIVE for significant arthralgias or myalgia  NEURO: NEGATIVE for weakness, dizziness or paresthesias  ENDOCRINE: NEGATIVE for temperature intolerance, skin/hair changes  HEME: NEGATIVE for bleeding problems  PSYCHIATRIC: NEGATIVE for changes in mood or affect    EXAM:   /86  Pulse 83  Temp 97.1  F (36.2  C) (Tympanic)  Ht 5' 9\" (1.753 m)  Wt (!) 343 lb 6.4 oz (155.8 kg)  SpO2 96%  BMI 50.71 kg/m2    GENERAL APPEARANCE: healthy, alert and no distress     EYES: EOMI, PERRL     HENT: ear canals and TM's normal and nose and mouth without ulcers or lesions     NECK: no adenopathy, no asymmetry, masses, or scars and thyroid normal to palpation     RESP: lungs clear to auscultation - no rales, rhonchi or wheezes     CV: regular rates and rhythm, normal S1 S2, no S3 or S4 and no murmur, click or rub     ABDOMEN:  soft, nontender, no HSM or masses and bowel sounds normal     MS: extremities normal- no gross deformities noted, no evidence of inflammation in joints, FROM in all extremities.     SKIN: no suspicious lesions or rashes     NEURO: Normal strength and tone, sensory exam grossly normal, mentation intact and speech normal     PSYCH: mentation appears normal. and affect normal/bright     LYMPHATICS: No cervical adenopathy    DIAGNOSTICS:   EKG: Not indicated due to non-vascular surgery and low risk of event (age <65 " and without cardiac risk factors)  A1c: 8.2 (12/2017)  Normal Stress ECHO 12/2017    Recent Labs   Lab Test  07/27/18   1635  12/17/17   0611   12/16/17   0906   12/27/12   1041   HGB  16.3*  15.2   < >   --    < >   --    PLT  361  308   < >   --    < >   --    INR   --    --    --   0.85*   --    --    NA  139  141   --   139   < >   --    POTASSIUM  3.7  4.0   --   3.8   < >   --    CR  0.68  0.64   --   0.65   < >   --    A1C   --   8.2*   --    --    --   6.5*    < > = values in this interval not displayed.        IMPRESSION:   Reason for surgery/procedure: cholelithiasis  Diagnosis/reason for consult: pre op    The proposed surgical procedure is considered INTERMEDIATE risk.    REVISED CARDIAC RISK INDEX  The patient has the following serious cardiovascular risks for perioperative complications such as (MI, PE, VFib and 3  AV Block):  No serious cardiac risks  INTERPRETATION: 0 risks: Class I (very low risk - 0.4% complication rate)    The patient has the following additional risks for perioperative complications:  Type 2 diabetes  Morbid obesity  Smoker      ICD-10-CM    1. Preop general physical exam Z01.818    2. Gallstones K80.20        RECOMMENDATIONS:       Obstructive Sleep Apnea (or suspected sleep apnea)  Patient is to bring their home CPAP with them on the day of surgery    APPROVAL GIVEN to proceed with proposed procedure, without further diagnostic evaluation       Signed Electronically by: Riri Meyers PA-C    Copy of this evaluation report is provided to requesting physician.    [facility] Preop Guidelines    Revised Cardiac Risk Index

## 2018-07-30 NOTE — MR AVS SNAPSHOT
After Visit Summary   7/30/2018    Mary Dixon    MRN: [id number]           Patient Information     Date Of Birth          1980        Visit Information        Provider Department      7/30/2018 2:20 PM Riri Meyers PA-C [facility]        Today's Diagnoses     Preop general physical exam    -  1      Care Instructions      Before Your Surgery      Call your surgeon if there is any change in your health. This includes signs of a cold or flu (such as a sore throat, runny nose, cough, rash or fever).    Do not smoke, drink alcohol or take over the counter medicine (unless your surgeon or primary care doctor tells you to) for the 24 hours before and after surgery.    If you take prescribed drugs: Follow your doctor s orders about which medicines to take and which to stop until after surgery.    Eating and drinking prior to surgery: follow the instructions from your surgeon    Take a shower or bath the night before surgery. Use the soap your surgeon gave you to gently clean your skin. If you do not have soap from your surgeon, use your regular soap. Do not shave or scrub the surgery site.  Wear clean pajamas and have clean sheets on your bed.           Follow-ups after your visit        Your next 10 appointments already scheduled     Jul 30, 2018  2:20 PM CDT   Pre-Op physical with Riri [facility]-C   [facility]    [id number] [place] [address]   [phone]            Jul 30, 2018  3:00 PM CDT   [facility] Visit with Gilbert Garcia MD   [facility]    [address] [address]   [phone]            Aug 01, 2018   Procedure with Gilbert Garcia MD   [facility] (--)    [address] [address]   [phone]           The medical center is located at [address] (between [address] in [place], four miles  "[facility]).            Aug 13, 2018  1:00 PM CDT   [facility] Visit with Gilbert Garcia MD   [facility]    [address] [facility] [address] [phone]              Who to contact     Normal or non-critical lab and imaging results will be communicated to you by [employer], letter or phone within 4 business days after the clinic has received the results. If you do not hear from us within 7 days, please contact the clinic through [employer] or phone. If you have a critical or abnormal lab result, we will notify you by phone as soon as possible.  Submit refill requests through [employer] or call your pharmacy and they will forward the refill request to us. Please allow 3 business days for your refill to be completed.          If you need to speak with a  for additional information , please call: [phone]             Additional Information About Your Visit        [employer] Information     [employer] gives you secure access to your electronic health record. If you see a primary care provider, you can also send messages to your care team and make appointments. If you have questions, please call your primary care clinic.  If you do not have a primary care provider, please call [phone] and they will assist you.        Care EveryWhere ID     This is your Care EveryWhere ID. This could be used by other organizations to access your [place] medical records  [id number]        Your Vitals Were     Pulse Temperature Height Pulse Oximetry BMI (Body Mass Index)       83 97.1  F (36.2  C) (Tympanic) 5' 9\" (1.753 m) 96% 50.71 kg/m2        Blood Pressure from Last 3 Encounters:   07/30/18 125/86   07/27/18 134/88   02/05/18 122/82    Weight from Last 3 Encounters:   07/30/18 (!) 343 lb 6.4 oz (155.8 kg)   02/05/18 (!) 345 lb (156.5 kg)   01/16/18 (!) 352 lb 3.2 oz (159.8 kg)              Today, you had the following     No orders found for display       " Primary Care Provider Office Phone # Fax #    Aldo Del Valle [phone]       [address] [place] [address]        Equal Access to Services     MARYJOE HANNAH : Rowena mary mooney amanda Eason, watreyda luqadaha, qaybta kaalmada ling, steven greenwoodoctavia serrato. [facility] [phone].    ATENCIÓN: Si habla español, tiene a blanchard disposición servicios gratuitos de asistencia lingüística. Llame al [phone].    We comply with applicable federal civil rights laws and [place] laws. We do not discriminate on the basis of race, color, national origin, age, disability, sex, sexual orientation, or gender identity.            Thank you!     Thank you for choosing [facility]  for your care. Our goal is always to provide you with excellent care. Hearing back from our patients is one way we can continue to improve our services. Please take a few minutes to complete the written survey that you may receive in the mail after your visit with us. Thank you!             Your Updated Medication List - Protect others around you: Learn how to safely use, store and throw away your medicines at www.disposemymeds.org.          This list is accurate as of 7/30/18 10:40 AM.  Always use your most recent med list.                   Brand Name Dispense Instructions for use Diagnosis    HYDROcodone-acetaminophen 5-325 MG per tablet    NORCO    12 tablet    Take 1-2 tablets by mouth every 6 hours as needed for pain maximum 10 tablet(s) per day        lisinopril 10 MG tablet    PRINIVIL/ZESTRIL    90 tablet    Take 1 tablet (10 mg) by mouth daily    Essential hypertension       ranitidine 150 MG tablet    ZANTAC    180 tablet    Take 1 tablet (150 mg) by mouth 2 times daily    Atypical chest pain       TYLENOL PO      Take 500 mg by mouth every 8 hours as needed for mild pain or fever

## 2018-07-30 NOTE — LETTER
7/30/2018         RE: Mary Dixon  [address]  [place] [address]        Dear Colleague,    Thank you for referring your patient, Mary Dixon, to the [facility]. Please see a copy of my visit note below.    PCP:  Aldo Del Valle    Chief complaint: Gallstones    History of Present Illness: Patient is a 38-year-old female with a history of ovarian cancer treated 10 years ago. She presented recently to the emergency room with pain in the right upper quadrant and in her back. She was found to have a large 2.8 cm gallstone in the gallbladder. There was no evidence of acute cholecystitis. No pancreatitis. Changes in the color of her urine or stool. CT scan confirmed the presence of the stone without any other findings.    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        Family History   Problem Relation Age of Onset     Hypertension Father      Diabetes Maternal Grandmother      Alzheimer Disease Maternal Grandmother      Breast Cancer Maternal Grandmother      HEART DISEASE Maternal Grandfather      Hypertension Maternal Grandfather      Coronary Artery Disease Maternal Grandfather 55     bypass       Social History   Substance Use Topics     Smoking status: Current Every Day Smoker     Packs/day: 0.50     Years: 2.00     Types: Cigarettes     Smokeless tobacco: Never Used     Alcohol use No       Current Outpatient Prescriptions   Medication Sig Dispense Refill     Acetaminophen (TYLENOL PO) Take 500 mg by mouth every 8 hours as needed for mild pain or fever       lisinopril (PRINIVIL/ZESTRIL) 10 MG tablet Take 1 tablet (10 mg) by mouth daily 90  tablet 3     HYDROcodone-acetaminophen (NORCO) 5-325 MG per tablet Take 1-2 tablets by mouth every 6 hours as needed for pain maximum 10 tablet(s) per day (Patient not taking: Reported on 7/30/2018) 12 tablet 0     ranitidine (ZANTAC) 150 MG tablet Take 1 tablet (150 mg) by mouth 2 times daily (Patient not taking: Reported on 7/30/2018) 180 tablet 3       No Known Allergies    Images:  Recent Results (from the past 744 hour(s))   POC US ABDOMEN LIMITED    Impression    [facility] Procedure Note      Limited Bedside ED Gallbladder  Ultrasound:    PROCEDURE: PERFORMED BY: Dr. Steve Diaz  INDICATIONS:  RUQ/Epigastric Pain  PROBE:  Low frequency convex probe  BODY LOCATION: Abdomen  FINDINGS:   An ultrasound of the gallbladder was performed using longitudinal and transverse views.  Gallstone(s):  Present  Gallbladder sludge:  Absent  Sonographic Benavides's sign:  Absent  Gallbladder wall thickening (greater than 4 mm):  Absent  Pericholecystic fluid: Absent  Common bile duct (dilated if internal diameter greater than 6 mm): Unable to visualize   INTERPRETATION: Patient has a possible gallstone at the opening of the gallbladder.  However, there is no evidence of inflammation around the gallbladder and the wall is not thickened.  IMAGE DOCUMENTATION: Images were archived to hard drive.     Abd/pelvis CT - no contrast - Stone Protocol    Narrative    CT ABDOMEN PELVIS WITHOUT CONTRAST   7/27/2018 7:51 PM     HISTORY: Right flank pain. Nausea.    TECHNIQUE: Volumetric helical sections were acquired from the lung  bases through the ischial tuberosities without IV contrast. Coronal  images were also reconstructed. Radiation dose for this scan was  reduced using automated exposure control, adjustment of the mA and/or  kV according to patient size, or iterative reconstruction technique.    COMPARISON: CT of the abdomen and pelvis performed 10/21/2008.    FINDINGS: No urinary calculi or hydronephrosis. Prominent  gallstone  within the gallbladder measures 2.8 cm. The liver, spleen, adrenal  glands, pancreas, and kidneys have unremarkable noncontrast  appearances. No bowel obstruction. No evidence for colitis or  diverticulitis. No free fluid in the pelvis. The visualized lung bases  are clear. 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      Impression    IMPRESSION:   1. Cholelithiasis. If there is clinical suspicion for cholecystitis,  gallbladder ultrasound would be recommended for further evaluation.  2. No urinary calculi or evidence for urinary obstruction.     TEENA JEONG MD       Labs:  Results for orders placed or performed during the hospital encounter of 07/27/18   POC US ABDOMEN LIMITED    Impression    [facility] Procedure Note      Limited Bedside ED Gallbladder  Ultrasound:    PROCEDURE: PERFORMED BY: Dr. Steve Diaz  INDICATIONS:  RUQ/Epigastric Pain  PROBE:  Low frequency convex probe  BODY LOCATION: Abdomen  FINDINGS:   An ultrasound of the gallbladder was performed using longitudinal and transverse views.  Gallstone(s):  Present  Gallbladder sludge:  Absent  Sonographic Benavides's sign:  Absent  Gallbladder wall thickening (greater than 4 mm):  Absent  Pericholecystic fluid: Absent  Common bile duct (dilated if internal diameter greater than 6 mm): Unable to visualize   INTERPRETATION: Patient has a possible gallstone at the opening of the gallbladder.  However, there is no evidence of inflammation around the gallbladder and the wall is not thickened.  IMAGE DOCUMENTATION: Images were archived to hard drive.     Abd/pelvis CT - no contrast - Stone Protocol    Narrative    CT ABDOMEN PELVIS WITHOUT CONTRAST   7/27/2018 7:51 PM     HISTORY: Right flank pain. Nausea.    TECHNIQUE: Volumetric helical sections were acquired from the lung  bases through the ischial tuberosities without IV contrast. Coronal  images were also reconstructed. Radiation dose for this scan was  reduced using  automated exposure control, adjustment of the mA and/or  kV according to patient size, or iterative reconstruction technique.    COMPARISON: CT of the abdomen and pelvis performed 10/21/2008.    FINDINGS: No urinary calculi or hydronephrosis. Prominent gallstone  within the gallbladder measures 2.8 cm. The liver, spleen, adrenal  glands, pancreas, and kidneys have unremarkable noncontrast  appearances. No bowel obstruction. No evidence for colitis or  diverticulitis. No free fluid in the pelvis. The visualized lung bases  are clear. 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      Impression    IMPRESSION:   1. Cholelithiasis. If there is clinical suspicion for cholecystitis,  gallbladder ultrasound would be recommended for further evaluation.  2. No urinary calculi or evidence for urinary obstruction.     TEENA JEONG MD   Routine UA with microscopic   Result Value Ref Range    Color Urine Yellow     Appearance Urine Cloudy     Glucose Urine Negative NEG^Negative mg/dL    Bilirubin Urine Negative NEG^Negative    Ketones Urine Negative NEG^Negative mg/dL    Specific Gravity Urine 1.028 1.003 - 1.035    Blood Urine Negative NEG^Negative    pH Urine 5.0 5.0 - 7.0 pH    Protein Albumin Urine Negative NEG^Negative mg/dL    Urobilinogen mg/dL 2.0 0.0 - 2.0 mg/dL    Nitrite Urine Negative NEG^Negative    Leukocyte Esterase Urine Trace (A) NEG^Negative    Source Midstream Urine     WBC Urine 12 (H) 0 - 5 /HPF    RBC Urine 2 0 - 2 /HPF    Bacteria Urine Few (A) NEG^Negative /HPF    Squamous Epithelial /HPF Urine 7 (H) 0 - 1 /HPF    Mucous Urine Present (A) NEG^Negative /LPF   HCG qualitative urine   Result Value Ref Range    HCG Qual Urine Negative NEG^Negative   CBC with platelets differential   Result Value Ref Range    WBC 10.6 4.0 - 11.0 10e9/L    RBC Count 5.34 (H) 3.8 - 5.2 10e12/L    Hemoglobin 16.3 (H) 11.7 - 15.7 g/dL    Hematocrit 48.0 (H) 35.0 - 47.0 %    MCV 90 78 - 100 fl    MCH 30.5 26.5 - 33.0 pg     MCHC 34.0 31.5 - 36.5 g/dL    RDW 12.6 10.0 - 15.0 %    Platelet Count 361 150 - 450 10e9/L    Diff Method Automated Method     % Neutrophils 51.2 %    % Lymphocytes 38.8 %    % Monocytes 7.6 %    % Eosinophils 1.5 %    % Basophils 0.5 %    % Immature Granulocytes 0.4 %    Nucleated RBCs 0 0 /100    Absolute Neutrophil 5.5 1.6 - 8.3 10e9/L    Absolute Lymphocytes 4.1 0.8 - 5.3 10e9/L    Absolute Monocytes 0.8 0.0 - 1.3 10e9/L    Absolute Eosinophils 0.2 0.0 - 0.7 10e9/L    Absolute Basophils 0.1 0.0 - 0.2 10e9/L    Abs Immature Granulocytes 0.0 0 - 0.4 10e9/L    Absolute Nucleated RBC 0.0    Basic metabolic panel   Result Value Ref Range    Sodium 139 133 - 144 mmol/L    Potassium 3.7 3.4 - 5.3 mmol/L    Chloride 107 94 - 109 mmol/L    Carbon Dioxide 27 20 - 32 mmol/L    Anion Gap 5 3 - 14 mmol/L    Glucose 189 (H) 70 - 99 mg/dL    Urea Nitrogen 13 7 - 30 mg/dL    Creatinine 0.68 0.52 - 1.04 mg/dL    GFR Estimate >90 >60 mL/min/1.7m2    GFR Estimate If Black >90 >60 mL/min/1.7m2    Calcium 9.1 8.5 - 10.1 mg/dL   Hepatic panel   Result Value Ref Range    Bilirubin Direct <0.1 0.0 - 0.2 mg/dL    Bilirubin Total 0.4 0.2 - 1.3 mg/dL    Albumin 3.7 3.4 - 5.0 g/dL    Protein Total 8.1 6.8 - 8.8 g/dL    Alkaline Phosphatase 146 40 - 150 U/L    ALT 27 0 - 50 U/L    AST 14 0 - 45 U/L   Lipase   Result Value Ref Range    Lipase 147 73 - 393 U/L   Amylase   Result Value Ref Range    Amylase 19 (L) 30 - 110 U/L       ROS:  Constitutional - Denies fevers, weight loss, malaise, lethargy  Neuro - Denies tremors or seizures  Pulmon - Denies SOB, dyspnea, hemoptysis, chronic cough or use of an inhaler  CV - Denies CP, SOB, lower extremity edema, difficulty w/ stairs, has never used NTG  GI - Denies hematemesis, BRBPR, melena, chronic diarrhea or epigastric pain   - Denies hematuria, difficulty voiding, h/o STDs  Hematology - Denies blood clotting disorders, chronic anemias  Dermatology - No melanomas or skin  "cancers  Rheumatology - No h/o RA  Pysch - Denies depression, bipolar d/o or schizophrenia    /86 (BP Location: Right arm, Patient Position: Chair, Cuff Size: Adult Large)  Pulse 98  Temp 97.1  F (36.2  C) (Tympanic)  Resp 16  Ht 1.753 m (5' 9\")  Wt (!) 156.5 kg (345 lb)  BMI 50.95 kg/m2    Exam:  General - Alert and Oriented X4, NAD  HEENT - Normocephalic, atraumatic,  Neck - supple, no LAD  Lungs - respirations unlabored, chest wall excursion is normal   CV - Heart RRR,   Abdomen - obese, Soft, non-tender, +BS, no hepatosplenomegaly, no palpable masses, nontender on today's exam, well-healed surgical scars involving just above the umbilicus down toward the symphysis pubis  Groins -deferred   Rectal - deferred  Neuro - Full ROM, Strength 5/5 and major muscle groups, sensation intact  Extremities - No cyanosis, clubbing or edema.      Assessment and Plan: Symptomatic cholelithiasis by history and imaging. The patient is interested in having cholecystectomy. 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 She will arrange a preoperative primary care provider and we will schedule surgery just as soon as we can get her in. I don't think the previous incisions will get in the way of our repair and I will try to repair the indentation in her abdominal wall due to her previous open wound that granulated closed.        Gilbert Garcia MD FACS            Again, thank you for allowing me to participate in the care of your patient.        Sincerely,        Gilbert Garcia MD    "

## 2018-07-30 NOTE — PROGRESS NOTES
PCP:  Aldo Del Valle    Chief complaint: Gallstones    History of Present Illness: Patient is a 38-year-old female with a history of ovarian cancer treated 10 years ago. She presented recently to the emergency room with pain in the right upper quadrant and in her back. She was found to have a large 2.8 cm gallstone in the gallbladder. There was no evidence of acute cholecystitis. No pancreatitis. Changes in the color of her urine or stool. CT scan confirmed the presence of the stone without any other findings.    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        Family History   Problem Relation Age of Onset     Hypertension Father      Diabetes Maternal Grandmother      Alzheimer Disease Maternal Grandmother      Breast Cancer Maternal Grandmother      HEART DISEASE Maternal Grandfather      Hypertension Maternal Grandfather      Coronary Artery Disease Maternal Grandfather 55     bypass       Social History   Substance Use Topics     Smoking status: Current Every Day Smoker     Packs/day: 0.50     Years: 2.00     Types: Cigarettes     Smokeless tobacco: Never Used     Alcohol use No       Current Outpatient Prescriptions   Medication Sig Dispense Refill     Acetaminophen (TYLENOL PO) Take 500 mg by mouth every 8 hours as needed for mild pain or fever       lisinopril (PRINIVIL/ZESTRIL) 10 MG tablet Take 1 tablet (10 mg) by mouth daily 90 tablet 3     HYDROcodone-acetaminophen (NORCO) 5-325 MG per tablet Take 1-2 tablets by mouth every 6 hours as needed for pain maximum 10 tablet(s) per day (Patient not taking: Reported on 7/30/2018) 12 tablet 0     ranitidine (ZANTAC)  150 MG tablet Take 1 tablet (150 mg) by mouth 2 times daily (Patient not taking: Reported on 7/30/2018) 180 tablet 3       No Known Allergies    Images:  Recent Results (from the past 744 hour(s))   POC US ABDOMEN LIMITED    Impression    [facility] Procedure Note      Limited Bedside ED Gallbladder  Ultrasound:    PROCEDURE: PERFORMED BY: Dr. Steve Diaz  INDICATIONS:  RUQ/Epigastric Pain  PROBE:  Low frequency convex probe  BODY LOCATION: Abdomen  FINDINGS:   An ultrasound of the gallbladder was performed using longitudinal and transverse views.  Gallstone(s):  Present  Gallbladder sludge:  Absent  Sonographic Benavides's sign:  Absent  Gallbladder wall thickening (greater than 4 mm):  Absent  Pericholecystic fluid: Absent  Common bile duct (dilated if internal diameter greater than 6 mm): Unable to visualize   INTERPRETATION: Patient has a possible gallstone at the opening of the gallbladder.  However, there is no evidence of inflammation around the gallbladder and the wall is not thickened.  IMAGE DOCUMENTATION: Images were archived to hard drive.     Abd/pelvis CT - no contrast - Stone Protocol    Narrative    CT ABDOMEN PELVIS WITHOUT CONTRAST   7/27/2018 7:51 PM     HISTORY: Right flank pain. Nausea.    TECHNIQUE: Volumetric helical sections were acquired from the lung  bases through the ischial tuberosities without IV contrast. Coronal  images were also reconstructed. Radiation dose for this scan was  reduced using automated exposure control, adjustment of the mA and/or  kV according to patient size, or iterative reconstruction technique.    COMPARISON: CT of the abdomen and pelvis performed 10/21/2008.    FINDINGS: No urinary calculi or hydronephrosis. Prominent gallstone  within the gallbladder measures 2.8 cm. The liver, spleen, adrenal  glands, pancreas, and kidneys have unremarkable noncontrast  appearances. No bowel obstruction. No evidence for colitis or  diverticulitis. No free fluid in the  pelvis. The visualized lung bases  are clear. 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      Impression    IMPRESSION:   1. Cholelithiasis. If there is clinical suspicion for cholecystitis,  gallbladder ultrasound would be recommended for further evaluation.  2. No urinary calculi or evidence for urinary obstruction.     TEENA JEONG MD       Labs:  Results for orders placed or performed during the hospital encounter of 07/27/18   POC US ABDOMEN LIMITED    Impression    [facility] Procedure Note      Limited Bedside ED Gallbladder  Ultrasound:    PROCEDURE: PERFORMED BY: Dr. Steve Diaz  INDICATIONS:  RUQ/Epigastric Pain  PROBE:  Low frequency convex probe  BODY LOCATION: Abdomen  FINDINGS:   An ultrasound of the gallbladder was performed using longitudinal and transverse views.  Gallstone(s):  Present  Gallbladder sludge:  Absent  Sonographic Benavides's sign:  Absent  Gallbladder wall thickening (greater than 4 mm):  Absent  Pericholecystic fluid: Absent  Common bile duct (dilated if internal diameter greater than 6 mm): Unable to visualize   INTERPRETATION: Patient has a possible gallstone at the opening of the gallbladder.  However, there is no evidence of inflammation around the gallbladder and the wall is not thickened.  IMAGE DOCUMENTATION: Images were archived to hard drive.     Abd/pelvis CT - no contrast - Stone Protocol    Narrative    CT ABDOMEN PELVIS WITHOUT CONTRAST   7/27/2018 7:51 PM     HISTORY: Right flank pain. Nausea.    TECHNIQUE: Volumetric helical sections were acquired from the lung  bases through the ischial tuberosities without IV contrast. Coronal  images were also reconstructed. Radiation dose for this scan was  reduced using automated exposure control, adjustment of the mA and/or  kV according to patient size, or iterative reconstruction technique.    COMPARISON: CT of the abdomen and pelvis performed 10/21/2008.    FINDINGS: No urinary calculi or hydronephrosis.  Prominent gallstone  within the gallbladder measures 2.8 cm. The liver, spleen, adrenal  glands, pancreas, and kidneys have unremarkable noncontrast  appearances. No bowel obstruction. No evidence for colitis or  diverticulitis. No free fluid in the pelvis. The visualized lung bases  are clear. 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      Impression    IMPRESSION:   1. Cholelithiasis. If there is clinical suspicion for cholecystitis,  gallbladder ultrasound would be recommended for further evaluation.  2. No urinary calculi or evidence for urinary obstruction.     TEENA JEONG MD   Routine UA with microscopic   Result Value Ref Range    Color Urine Yellow     Appearance Urine Cloudy     Glucose Urine Negative NEG^Negative mg/dL    Bilirubin Urine Negative NEG^Negative    Ketones Urine Negative NEG^Negative mg/dL    Specific Gravity Urine 1.028 1.003 - 1.035    Blood Urine Negative NEG^Negative    pH Urine 5.0 5.0 - 7.0 pH    Protein Albumin Urine Negative NEG^Negative mg/dL    Urobilinogen mg/dL 2.0 0.0 - 2.0 mg/dL    Nitrite Urine Negative NEG^Negative    Leukocyte Esterase Urine Trace (A) NEG^Negative    Source Midstream Urine     WBC Urine 12 (H) 0 - 5 /HPF    RBC Urine 2 0 - 2 /HPF    Bacteria Urine Few (A) NEG^Negative /HPF    Squamous Epithelial /HPF Urine 7 (H) 0 - 1 /HPF    Mucous Urine Present (A) NEG^Negative /LPF   HCG qualitative urine   Result Value Ref Range    HCG Qual Urine Negative NEG^Negative   CBC with platelets differential   Result Value Ref Range    WBC 10.6 4.0 - 11.0 10e9/L    RBC Count 5.34 (H) 3.8 - 5.2 10e12/L    Hemoglobin 16.3 (H) 11.7 - 15.7 g/dL    Hematocrit 48.0 (H) 35.0 - 47.0 %    MCV 90 78 - 100 fl    MCH 30.5 26.5 - 33.0 pg    MCHC 34.0 31.5 - 36.5 g/dL    RDW 12.6 10.0 - 15.0 %    Platelet Count 361 150 - 450 10e9/L    Diff Method Automated Method     % Neutrophils 51.2 %    % Lymphocytes 38.8 %    % Monocytes 7.6 %    % Eosinophils 1.5 %    % Basophils 0.5 %  "   % Immature Granulocytes 0.4 %    Nucleated RBCs 0 0 /100    Absolute Neutrophil 5.5 1.6 - 8.3 10e9/L    Absolute Lymphocytes 4.1 0.8 - 5.3 10e9/L    Absolute Monocytes 0.8 0.0 - 1.3 10e9/L    Absolute Eosinophils 0.2 0.0 - 0.7 10e9/L    Absolute Basophils 0.1 0.0 - 0.2 10e9/L    Abs Immature Granulocytes 0.0 0 - 0.4 10e9/L    Absolute Nucleated RBC 0.0    Basic metabolic panel   Result Value Ref Range    Sodium 139 133 - 144 mmol/L    Potassium 3.7 3.4 - 5.3 mmol/L    Chloride 107 94 - 109 mmol/L    Carbon Dioxide 27 20 - 32 mmol/L    Anion Gap 5 3 - 14 mmol/L    Glucose 189 (H) 70 - 99 mg/dL    Urea Nitrogen 13 7 - 30 mg/dL    Creatinine 0.68 0.52 - 1.04 mg/dL    GFR Estimate >90 >60 mL/min/1.7m2    GFR Estimate If Black >90 >60 mL/min/1.7m2    Calcium 9.1 8.5 - 10.1 mg/dL   Hepatic panel   Result Value Ref Range    Bilirubin Direct <0.1 0.0 - 0.2 mg/dL    Bilirubin Total 0.4 0.2 - 1.3 mg/dL    Albumin 3.7 3.4 - 5.0 g/dL    Protein Total 8.1 6.8 - 8.8 g/dL    Alkaline Phosphatase 146 40 - 150 U/L    ALT 27 0 - 50 U/L    AST 14 0 - 45 U/L   Lipase   Result Value Ref Range    Lipase 147 73 - 393 U/L   Amylase   Result Value Ref Range    Amylase 19 (L) 30 - 110 U/L       ROS:  Constitutional - Denies fevers, weight loss, malaise, lethargy  Neuro - Denies tremors or seizures  Pulmon - Denies SOB, dyspnea, hemoptysis, chronic cough or use of an inhaler  CV - Denies CP, SOB, lower extremity edema, difficulty w/ stairs, has never used NTG  GI - Denies hematemesis, BRBPR, melena, chronic diarrhea or epigastric pain   - Denies hematuria, difficulty voiding, h/o STDs  Hematology - Denies blood clotting disorders, chronic anemias  Dermatology - No melanomas or skin cancers  Rheumatology - No h/o RA  Pysch - Denies depression, bipolar d/o or schizophrenia    /86 (BP Location: Right arm, Patient Position: Chair, Cuff Size: Adult Large)  Pulse 98  Temp 97.1  F (36.2  C) (Tympanic)  Resp 16  Ht 1.753 m (5' 9\")  Wt " (!) 156.5 kg (345 lb)  BMI 50.95 kg/m2    Exam:  General - Alert and Oriented X4, NAD  HEENT - Normocephalic, atraumatic,  Neck - supple, no LAD  Lungs - respirations unlabored, chest wall excursion is normal   CV - Heart RRR,   Abdomen - obese, Soft, non-tender, +BS, no hepatosplenomegaly, no palpable masses, nontender on today's exam, well-healed surgical scars involving just above the umbilicus down toward the symphysis pubis  Groins -deferred   Rectal - deferred  Neuro - Full ROM, Strength 5/5 and major muscle groups, sensation intact  Extremities - No cyanosis, clubbing or edema.      Assessment and Plan: Symptomatic cholelithiasis by history and imaging. The patient is interested in having cholecystectomy. 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 She will arrange a preoperative primary care provider and we will schedule surgery just as soon as we can get her in. I don't think the previous incisions will get in the way of our repair and I will try to repair the indentation in her abdominal wall due to her previous open wound that granulated closed.        Gilbert Garcia MD FACS

## 2018-07-31 ENCOUNTER — ANESTHESIA EVENT (OUTPATIENT)
Dept: SURGERY | Facility: CLINIC | Age: 38
End: 2018-07-31
Payer: COMMERCIAL

## 2018-08-01 ENCOUNTER — SURGERY (OUTPATIENT)
Age: 38
End: 2018-08-01

## 2018-08-01 ENCOUNTER — HOSPITAL ENCOUNTER (OUTPATIENT)
Facility: CLINIC | Age: 38
Discharge: HOME | End: 2018-08-01
Attending: SURGERY | Admitting: SURGERY
Payer: COMMERCIAL

## 2018-08-01 ENCOUNTER — ANESTHESIA (OUTPATIENT)
Dept: SURGERY | Facility: CLINIC | Age: 38
End: 2018-08-01
Payer: COMMERCIAL

## 2018-08-01 VITALS
SYSTOLIC BLOOD PRESSURE: 138 MMHG | TEMPERATURE: 98.5 F | RESPIRATION RATE: 16 BRPM | HEIGHT: 69 IN | DIASTOLIC BLOOD PRESSURE: 86 MMHG | WEIGHT: 293 LBS | OXYGEN SATURATION: 96 % | BODY MASS INDEX: 43.4 KG/M2

## 2018-08-01 DIAGNOSIS — G89.18 POST-OP PAIN: Primary | ICD-10-CM

## 2018-08-01 PROCEDURE — 25000132 ZZH RX MED GY IP 250 OP 250 PS 637: Performed by: SURGERY

## 2018-08-01 PROCEDURE — 25000125 ZZHC RX 250: Performed by: NURSE ANESTHETIST, CERTIFIED REGISTERED

## 2018-08-01 PROCEDURE — 27210794 ZZH OR GENERAL SUPPLY STERILE: Performed by: SURGERY

## 2018-08-01 PROCEDURE — 25000125 ZZHC RX 250: Performed by: SURGERY

## 2018-08-01 PROCEDURE — 27210995 ZZH RX 272: Performed by: SURGERY

## 2018-08-01 PROCEDURE — 40000306 ZZH STATISTIC PRE PROC ASSESS II: Performed by: SURGERY

## 2018-08-01 PROCEDURE — 37000008 ZZH ANESTHESIA TECHNICAL FEE, 1ST 30 MIN: Performed by: SURGERY

## 2018-08-01 PROCEDURE — 71000012 ZZH RECOVERY PHASE 1 LEVEL 1 FIRST HR: Performed by: SURGERY

## 2018-08-01 PROCEDURE — 71000027 ZZH RECOVERY PHASE 2 EACH 15 MINS: Performed by: SURGERY

## 2018-08-01 PROCEDURE — 36000065 ZZH SURGERY LEVEL 4 W FLUORO 1ST 30 MIN: Performed by: SURGERY

## 2018-08-01 PROCEDURE — 25000128 H RX IP 250 OP 636: Performed by: SURGERY

## 2018-08-01 PROCEDURE — 37000009 ZZH ANESTHESIA TECHNICAL FEE, EACH ADDTL 15 MIN: Performed by: SURGERY

## 2018-08-01 PROCEDURE — 71000013 ZZH RECOVERY PHASE 1 LEVEL 1 EA ADDTL HR: Performed by: SURGERY

## 2018-08-01 PROCEDURE — 25000128 H RX IP 250 OP 636: Performed by: NURSE ANESTHETIST, CERTIFIED REGISTERED

## 2018-08-01 PROCEDURE — 88304 TISSUE EXAM BY PATHOLOGIST: CPT | Performed by: SURGERY

## 2018-08-01 PROCEDURE — 25000566 ZZH SEVOFLURANE, EA 15 MIN: Performed by: SURGERY

## 2018-08-01 PROCEDURE — C9399 UNCLASSIFIED DRUGS OR BIOLOG: HCPCS | Performed by: NURSE ANESTHETIST, CERTIFIED REGISTERED

## 2018-08-01 PROCEDURE — 47563 LAPARO CHOLECYSTECTOMY/GRAPH: CPT | Performed by: SURGERY

## 2018-08-01 PROCEDURE — 88304 TISSUE EXAM BY PATHOLOGIST: CPT | Mod: 26 | Performed by: SURGERY

## 2018-08-01 PROCEDURE — 36000063 ZZH SURGERY LEVEL 4 EA 15 ADDTL MIN: Performed by: SURGERY

## 2018-08-01 RX ORDER — KETOROLAC TROMETHAMINE 30 MG/ML
INJECTION, SOLUTION INTRAMUSCULAR; INTRAVENOUS PRN
Status: DISCONTINUED | OUTPATIENT
Start: 2018-08-01 | End: 2018-08-01

## 2018-08-01 RX ORDER — HYDROCODONE BITARTRATE AND ACETAMINOPHEN 5; 325 MG/1; MG/1
1-2 TABLET ORAL EVERY 4 HOURS PRN
Status: DISCONTINUED | OUTPATIENT
Start: 2018-08-01 | End: 2018-08-01 | Stop reason: HOSPADM

## 2018-08-01 RX ORDER — GLYCOPYRROLATE 0.2 MG/ML
INJECTION, SOLUTION INTRAMUSCULAR; INTRAVENOUS PRN
Status: DISCONTINUED | OUTPATIENT
Start: 2018-08-01 | End: 2018-08-01

## 2018-08-01 RX ORDER — SODIUM CHLORIDE, SODIUM LACTATE, POTASSIUM CHLORIDE, CALCIUM CHLORIDE 600; 310; 30; 20 MG/100ML; MG/100ML; MG/100ML; MG/100ML
INJECTION, SOLUTION INTRAVENOUS CONTINUOUS
Status: DISCONTINUED | OUTPATIENT
Start: 2018-08-01 | End: 2018-08-01 | Stop reason: HOSPADM

## 2018-08-01 RX ORDER — FENTANYL CITRATE 50 UG/ML
25-50 INJECTION, SOLUTION INTRAMUSCULAR; INTRAVENOUS
Status: DISCONTINUED | OUTPATIENT
Start: 2018-08-01 | End: 2018-08-01 | Stop reason: HOSPADM

## 2018-08-01 RX ORDER — LIDOCAINE HYDROCHLORIDE 10 MG/ML
INJECTION, SOLUTION EPIDURAL; INFILTRATION; INTRACAUDAL; PERINEURAL PRN
Status: DISCONTINUED | OUTPATIENT
Start: 2018-08-01 | End: 2018-08-01

## 2018-08-01 RX ORDER — ALBUTEROL SULFATE 0.83 MG/ML
2.5 SOLUTION RESPIRATORY (INHALATION) EVERY 4 HOURS PRN
Status: DISCONTINUED | OUTPATIENT
Start: 2018-08-01 | End: 2018-08-01 | Stop reason: HOSPADM

## 2018-08-01 RX ORDER — LIDOCAINE 40 MG/G
CREAM TOPICAL
Status: DISCONTINUED | OUTPATIENT
Start: 2018-08-01 | End: 2018-08-01 | Stop reason: HOSPADM

## 2018-08-01 RX ORDER — DEXAMETHASONE SODIUM PHOSPHATE 4 MG/ML
INJECTION, SOLUTION INTRA-ARTICULAR; INTRALESIONAL; INTRAMUSCULAR; INTRAVENOUS; SOFT TISSUE PRN
Status: DISCONTINUED | OUTPATIENT
Start: 2018-08-01 | End: 2018-08-01

## 2018-08-01 RX ORDER — INDOCYANINE GREEN AND WATER 25 MG
2.5 KIT INJECTION ONCE
Status: DISCONTINUED | OUTPATIENT
Start: 2018-08-01 | End: 2018-08-01 | Stop reason: RX

## 2018-08-01 RX ORDER — ONDANSETRON 2 MG/ML
4 INJECTION INTRAMUSCULAR; INTRAVENOUS EVERY 30 MIN PRN
Status: DISCONTINUED | OUTPATIENT
Start: 2018-08-01 | End: 2018-08-01 | Stop reason: HOSPADM

## 2018-08-01 RX ORDER — MEPERIDINE HYDROCHLORIDE 25 MG/ML
12.5 INJECTION INTRAMUSCULAR; INTRAVENOUS; SUBCUTANEOUS
Status: DISCONTINUED | OUTPATIENT
Start: 2018-08-01 | End: 2018-08-01 | Stop reason: HOSPADM

## 2018-08-01 RX ORDER — HYDROCODONE BITARTRATE AND ACETAMINOPHEN 5; 325 MG/1; MG/1
1-2 TABLET ORAL EVERY 6 HOURS PRN
Status: DISCONTINUED | OUTPATIENT
Start: 2018-08-01 | End: 2018-08-01 | Stop reason: HOSPADM

## 2018-08-01 RX ORDER — INDOCYANINE GREEN AND WATER 25 MG
KIT INJECTION PRN
Status: DISCONTINUED | OUTPATIENT
Start: 2018-08-01 | End: 2018-08-01

## 2018-08-01 RX ORDER — HYDROMORPHONE HYDROCHLORIDE 1 MG/ML
.3-.5 INJECTION, SOLUTION INTRAMUSCULAR; INTRAVENOUS; SUBCUTANEOUS EVERY 10 MIN PRN
Status: DISCONTINUED | OUTPATIENT
Start: 2018-08-01 | End: 2018-08-01 | Stop reason: HOSPADM

## 2018-08-01 RX ORDER — NALOXONE HYDROCHLORIDE 0.4 MG/ML
.1-.4 INJECTION, SOLUTION INTRAMUSCULAR; INTRAVENOUS; SUBCUTANEOUS
Status: DISCONTINUED | OUTPATIENT
Start: 2018-08-01 | End: 2018-08-01 | Stop reason: HOSPADM

## 2018-08-01 RX ORDER — PROPOFOL 10 MG/ML
INJECTION, EMULSION INTRAVENOUS PRN
Status: DISCONTINUED | OUTPATIENT
Start: 2018-08-01 | End: 2018-08-01

## 2018-08-01 RX ORDER — ONDANSETRON 4 MG/1
4 TABLET, ORALLY DISINTEGRATING ORAL EVERY 30 MIN PRN
Status: DISCONTINUED | OUTPATIENT
Start: 2018-08-01 | End: 2018-08-01 | Stop reason: HOSPADM

## 2018-08-01 RX ORDER — CEFAZOLIN SODIUM 1 G/50ML
1 INJECTION, SOLUTION INTRAVENOUS SEE ADMIN INSTRUCTIONS
Status: DISCONTINUED | OUTPATIENT
Start: 2018-08-01 | End: 2018-08-01 | Stop reason: HOSPADM

## 2018-08-01 RX ORDER — FENTANYL CITRATE 50 UG/ML
INJECTION, SOLUTION INTRAMUSCULAR; INTRAVENOUS PRN
Status: DISCONTINUED | OUTPATIENT
Start: 2018-08-01 | End: 2018-08-01

## 2018-08-01 RX ORDER — BUPIVACAINE HYDROCHLORIDE AND EPINEPHRINE 2.5; 5 MG/ML; UG/ML
INJECTION, SOLUTION INFILTRATION; PERINEURAL PRN
Status: DISCONTINUED | OUTPATIENT
Start: 2018-08-01 | End: 2018-08-01 | Stop reason: HOSPADM

## 2018-08-01 RX ORDER — HYDROCODONE BITARTRATE AND ACETAMINOPHEN 5; 325 MG/1; MG/1
1-2 TABLET ORAL EVERY 4 HOURS PRN
Qty: 20 TABLET | Refills: 0 | Status: SHIPPED | OUTPATIENT
Start: 2018-08-01 | End: 2018-08-06

## 2018-08-01 RX ORDER — ONDANSETRON 2 MG/ML
INJECTION INTRAMUSCULAR; INTRAVENOUS PRN
Status: DISCONTINUED | OUTPATIENT
Start: 2018-08-01 | End: 2018-08-01

## 2018-08-01 RX ADMIN — FENTANYL CITRATE 50 MCG: 50 INJECTION INTRAMUSCULAR; INTRAVENOUS at 14:05

## 2018-08-01 RX ADMIN — HYDROCODONE BITARTRATE AND ACETAMINOPHEN 2 TABLET: 5; 325 TABLET ORAL at 14:35

## 2018-08-01 RX ADMIN — LIDOCAINE HYDROCHLORIDE 1 ML: 10 INJECTION, SOLUTION EPIDURAL; INFILTRATION; INTRACAUDAL; PERINEURAL at 09:53

## 2018-08-01 RX ADMIN — GLYCOPYRROLATE 0.2 MG: 0.2 INJECTION, SOLUTION INTRAMUSCULAR; INTRAVENOUS at 11:37

## 2018-08-01 RX ADMIN — SUGAMMADEX 300 MG: 100 INJECTION, SOLUTION INTRAVENOUS at 13:04

## 2018-08-01 RX ADMIN — INDOCYANINE GREEN 2.5 MG: KIT INTRAVENOUS at 12:26

## 2018-08-01 RX ADMIN — ONDANSETRON 4 MG: 2 INJECTION INTRAMUSCULAR; INTRAVENOUS at 11:37

## 2018-08-01 RX ADMIN — MIDAZOLAM 2 MG: 1 INJECTION INTRAMUSCULAR; INTRAVENOUS at 11:37

## 2018-08-01 RX ADMIN — FENTANYL CITRATE 250 MCG: 50 INJECTION, SOLUTION INTRAMUSCULAR; INTRAVENOUS at 11:37

## 2018-08-01 RX ADMIN — SODIUM CHLORIDE, POTASSIUM CHLORIDE, SODIUM LACTATE AND CALCIUM CHLORIDE: 600; 310; 30; 20 INJECTION, SOLUTION INTRAVENOUS at 09:53

## 2018-08-01 RX ADMIN — PROPOFOL 200 MG: 10 INJECTION, EMULSION INTRAVENOUS at 11:37

## 2018-08-01 RX ADMIN — SODIUM CHLORIDE, POTASSIUM CHLORIDE, SODIUM LACTATE AND CALCIUM CHLORIDE: 600; 310; 30; 20 INJECTION, SOLUTION INTRAVENOUS at 12:33

## 2018-08-01 RX ADMIN — CEFAZOLIN SODIUM 3 G: 1 INJECTION, POWDER, FOR SOLUTION INTRAMUSCULAR; INTRAVENOUS at 11:40

## 2018-08-01 RX ADMIN — ROCURONIUM BROMIDE 15 MG: 10 INJECTION INTRAVENOUS at 12:51

## 2018-08-01 RX ADMIN — ROCURONIUM BROMIDE 50 MG: 10 INJECTION INTRAVENOUS at 11:37

## 2018-08-01 RX ADMIN — FENTANYL CITRATE 100 MCG: 50 INJECTION, SOLUTION INTRAMUSCULAR; INTRAVENOUS at 12:58

## 2018-08-01 RX ADMIN — LIDOCAINE HYDROCHLORIDE 50 MG: 10 INJECTION, SOLUTION EPIDURAL; INFILTRATION; INTRACAUDAL; PERINEURAL at 11:37

## 2018-08-01 RX ADMIN — INDOCYANINE GREEN 2.5 MG: KIT INTRAVENOUS at 11:37

## 2018-08-01 RX ADMIN — FENTANYL CITRATE 50 MCG: 50 INJECTION INTRAMUSCULAR; INTRAVENOUS at 13:59

## 2018-08-01 RX ADMIN — KETOROLAC TROMETHAMINE 30 MG: 30 INJECTION, SOLUTION INTRAMUSCULAR at 12:54

## 2018-08-01 RX ADMIN — INDOCYANINE GREEN 2.5 MG: KIT INTRAVENOUS at 11:34

## 2018-08-01 RX ADMIN — Medication 1 KIT: at 12:33

## 2018-08-01 RX ADMIN — INDOCYANINE GREEN 2.5 MG: 25 INJECTION, POWDER, LYOPHILIZED, FOR SOLUTION INTRAVENOUS at 10:02

## 2018-08-01 RX ADMIN — BUPIVACAINE HYDROCHLORIDE AND EPINEPHRINE BITARTRATE 30 ML: 2.5; .005 INJECTION, SOLUTION INFILTRATION; PERINEURAL at 12:58

## 2018-08-01 RX ADMIN — Medication 0.5 MG: at 14:00

## 2018-08-01 RX ADMIN — DEXAMETHASONE SODIUM PHOSPHATE 8 MG: 4 INJECTION, SOLUTION INTRA-ARTICULAR; INTRALESIONAL; INTRAMUSCULAR; INTRAVENOUS; SOFT TISSUE at 11:37

## 2018-08-01 ASSESSMENT — LIFESTYLE VARIABLES: TOBACCO_USE: 1

## 2018-08-01 NOTE — DISCHARGE INSTRUCTIONS
Same Day Surgery Discharge Instructions  Special Precautions After Surgery - Adult    1. It is not unusual to feel lightheaded or faint, up to 24 hours after surgery or while taking pain medication.  If you have these symptoms; sit for a few minutes before standing and have someone assist you when getting up.  2. You should rest and relax for the next 24 hours and must have someone stay with you for at least 24 hours after your discharge.  3. DO NOT DRIVE any vehicle or operate mechanical equipment for 24 hours following the end of your surgery.  DO NOT DRIVE while taking narcotic pain medications that have been prescribed by your physician.  If you had a limb operated on, you must be able to use it fully to drive.  4. DO NOT drink alcoholic beverages for 24 hours following surgery or while taking prescription pain medication.  5. Drink clear liquids (apple juice, ginger ale, broth, 7-Up, etc.).  Progress to your regular diet as you feel able.  6. Any questions call your physician and do not make important decisions for 24 hours.          __________________________________________________________________________________________________________________________________  IMPORTANT NUMBERS:    [facility] Main Number:  [phone], [phone]  Pharmacy:  [phone]  Same Day Surgery:  [phone], Monday - Friday until 8:30 p.m.  Urgent Care:  [phone]  Emergency Room:  [phone]      [facility]:  [phone]                                                                             Surgery Specialty Clinic:  [phone]           Wash incisions daily with soap and water. Some mild redness or swelling is expected. If draining, cover with dry gauze.    No lifting restrictions.  You may lift as comfort permits.    Okay to use ice pack over wound as necessary for comfort.    Use pain medication as necessary but avoid constipating side effects. Ibuprofen okay but avoid Tylenol as your  pain medication already contains this.    Diet as tolerated. No restrictions.    Follow up with Dr Garcia in 1-2 weeks.    I would recommend two weeks of recovery before returning to work, as there is a lot of lifting involved.

## 2018-08-01 NOTE — BRIEF OP NOTE
[facility]   Brief Operative Note    Pre-operative diagnosis: symptomatic cholelithiasis   Post-operative diagnosis cholelithiasis   Procedure: Procedure(s):  Laparoscopic Cholecystectomy - Wound Class: II-Clean Contaminated   Surgeon(s): Surgeon(s) and Role:     * Gilbert Garcia MD - Primary   Estimated blood loss: * No values recorded between 8/1/2018 11:59 AM and 8/1/2018  1:12 PM *    Specimens:   ID Type Source Tests Collected by Time Destination   A :  Organ Gallbladder and Contents SURGICAL PATHOLOGY EXAM Gilbert Garcia MD 8/1/2018 12:59 PM       Findings: 1. One large 2.5-3 cm stone  2. EBL 10cc

## 2018-08-01 NOTE — ANESTHESIA PREPROCEDURE EVALUATION
Anesthesia Evaluation     . Pt has had prior anesthetic. Type: General    No history of anesthetic complications          ROS/MED HX    ENT/Pulmonary:     (+)tobacco use, Current use , . .    Neurologic:     (+)other neuro Benign intracranial hypertesion    Cardiovascular:     (+) hypertension----. : . . . :. .       METS/Exercise Tolerance:  >4 METS   Hematologic:         Musculoskeletal:  - neg musculoskeletal ROS       GI/Hepatic:  - neg GI/hepatic ROS       Renal/Genitourinary:  - ROS Renal section negative       Endo:     (+) Obesity, .      Psychiatric:     (+) psychiatric history Psychiatric Hx List: ADD.      Infectious Disease:  - neg infectious disease ROS       Malignancy:   (+) Malignancy History of Other  Other CA Ovarian Remission status post Surgery         Other:    - neg other ROS                 Physical Exam  Normal systems: cardiovascular and pulmonary    Airway   Mallampati: II  TM distance: >3 FB  Neck ROM: full    Dental   (+) caps    Cardiovascular       Pulmonary                     Anesthesia Plan      History & Physical Review      ASA Status:  3 .    NPO Status:  > 8 hours    Plan for General and ETT with Inhalation induction. Maintenance will be Balanced.    PONV prophylaxis:  Ondansetron (or other 5HT-3) and Dexamethasone or Solumedrol  Additional equipment: Videolaryngoscope Will do inhalation induction, place IV after induction.      Postoperative Care  Postoperative pain management:  IV analgesics and Oral pain medications.      Consents  Anesthetic plan, risks, benefits and alternatives discussed with:  Patient..                          .

## 2018-08-01 NOTE — IP AVS SNAPSHOT
[facility] PreOP/Phase II    [address] [address]    Phone:  [phone]    Fax:  [phone]                                       After Visit Summary   8/1/2018    Mary Dixon    MRN: [id number]           After Visit Summary Signature Page     I have received my discharge instructions, and my questions have been answered. I have discussed any challenges I see with this plan with the nurse or doctor.    ..........................................................................................................................................  Patient/Patient Representative Signature      ..........................................................................................................................................  Patient Representative Print Name and Relationship to Patient    ..................................................               ................................................  Date                                            Time    ..........................................................................................................................................  Reviewed by Signature/Title    ...................................................              ..............................................  Date                                                            Time

## 2018-08-01 NOTE — ANESTHESIA CARE TRANSFER NOTE
Patient: Mary Dixon    Procedure(s):  Laparoscopic Cholecystectomy - Wound Class: II-Clean Contaminated    Diagnosis: symptomatic cholelithiasis  Diagnosis Additional Information: No value filed.    Anesthesia Type:   General, ETT     Note:  Airway :Face Mask  Patient transferred to:PACU  Handoff Report: Identifed the Patient, Identified the Reponsible Provider, Reviewed the pertinent medical history, Discussed the surgical course, Reviewed Intra-OP anesthesia mangement and issues during anesthesia, Set expectations for post-procedure period and Allowed opportunity for questions and acknowledgement of understanding      Vitals: (Last set prior to Anesthesia Care Transfer)    CRNA VITALS  8/1/2018 1257 - 8/1/2018 1334      8/1/2018             Resp Rate (observed): (!)  2                Electronically Signed By: LYLE Palomino CRNA  August 1, 2018  1:34 PM

## 2018-08-01 NOTE — OP NOTE
Procedure Date: 08/01/2018      PREOPERATIVE DIAGNOSIS:  Symptomatic cholelithiasis.      POSTOPERATIVE DIAGNOSIS:  Symptomatic cholelithiasis.      PROCEDURE:  Laparoscopic cholecystectomy.      SURGEON:  Gilbert Garcia MD       FIRST ASSISTANT:  None.      ANESTHESIA:  General.      INDICATIONS:  This is a 38-year-old female who presented to the clinic complaining of abdominal pain which was diagnosed as a gallstone.  She was found to have a very large gallstone in the neck of the gallbladder.  She elected to proceed with cholecystectomy.  Prior to the procedure, she was counseled about the risks, benefits and alternatives of the procedure and she agreed to proceed.  All of her questions were answered.      DESCRIPTION OF PROCEDURE:  The patient was brought to the operating room and placed on the table in the supine position.  After administration of general anesthesia, the patient's abdomen was prepped and draped in the usual sterile fashion.  Prior to the procedure, she had been given a single dose of 2.5 mg of indocyanine green for fluorescent cholangiography.  At this point, a surgical timeout was performed to identify both the patient and the proposed procedure.      A small incision was made at the most superior aspect of her previous incision and dissection carried down through subcutaneous tissues to the level of the fascia.  The fascia was incised in the midline and 2 stay sutures of 0 Vicryl were placed.  The fascia was further elevated and the peritoneal cavity bluntly entered.  The Umang trocar was placed and the abdomen insufflated to 15 mmHg with CO2 gas.  Three further 5 mm ports were placed along the right upper quadrant.  These were placed under direct vision.  The patient was then placed in the reverse Trendelenburg position with the left side rotated downward.  The fundus of the gallbladder was identified, grasped and retracted superiorly and anteriorly, exposing the infundibulum.  A 2nd grasper  was placed here.  There was some fat overlying the lower half of the gallbladder and this was peeled off, exposing the cystic duct and cystic artery, which appeared to be running right next to each other.  Using the fluorescent cholangiography, I was able to identify the cystic duct easily and we could see deep down the common bile duct, fluorescing.  The cystic duct and cystic artery were individually clipped and divided with a scissors.  The gallbladder was then teased off the gallbladder bed using cautery.  Hemostasis was obtained by cautery as we went.  There was just a little bit of oozing from one of the vessels that we had clipped.  It was coming out quite slowly, so I did not do any excessive clipping or cauterizing down in this area.  I just mixed up some FloSeal and coated this area.  There was no further bleeding noted after that.        The gallbladder was removed from the gallbladder bed and placed into an Endobag and retrieved through the supraumbilical trocar site.  The Umang trocar was replaced and the abdomen reinsufflated.  The right upper quadrant was irrigated with several liters of saline and aspirated clean.  There was no bleeding or bile leakage detected.  All the ports were then removed under direct vision.  No bleeding was seen.  The supraumbilical fascia was closed with several interrupted figure-of-eight 0 Vicryl sutures.  All the wounds were infiltrated with Marcaine with epinephrine and closed with subcuticular 4-0 Vicryl sutures.  Surgical glue (Dermabond) was used as a final dressing.  The patient was then awakened from anesthesia and taken to the recovery room awake and in stable condition, having tolerated the procedure well.  There were no complications.  Needle, sponge, and instrument counts were correct x 2.  Blood loss was less than 10 mL.        It should be noted that the patient requested her stone, so this was photographed for the medical record and then placed into a  container and given to the patient.  It measured approximately 3 to perhaps 4 cm across.         YUKI PEREZ MD             D: 2018   T: 2018   MT: LIBRA      Name:     FABRIZIO CANDELARIA   MRN:      [id number]        Account:        [id number]   :      1980           Procedure Date: 2018      Document: [id number]

## 2018-08-01 NOTE — ANESTHESIA POSTPROCEDURE EVALUATION
Patient: Mary Dixon    Procedure(s):  Laparoscopic Cholecystectomy - Wound Class: II-Clean Contaminated    Diagnosis:symptomatic cholelithiasis  Diagnosis Additional Information: No value filed.    Anesthesia Type:  General, ETT    Note:  Anesthesia Post Evaluation    Patient location during evaluation: Phase 2 and Bedside  Patient participation: Able to fully participate in evaluation  Level of consciousness: awake and alert  Pain management: adequate  Airway patency: patent  Cardiovascular status: acceptable  Respiratory status: acceptable  Hydration status: acceptable  PONV: none     Anesthetic complications: None          Last vitals:  Vitals:    08/01/18 1430 08/01/18 1445 08/01/18 1506   BP: (!) 140/91 142/83 138/86   Resp: 16 (!) 43 16   Temp:      SpO2: 94% 94% 96%         Electronically Signed By: Bartolome Fleming CRNA, APRN CRNA  August 1, 2018  4:38 PM

## 2018-08-01 NOTE — IP AVS SNAPSHOT
MRN:[id number]                      After Visit Summary   8/1/2018    Mary Dixon    MRN: [id number]           Thank you!     Thank you for choosing [facility] for your care. Our goal is always to provide you with excellent care. Hearing back from our patients is one way we can continue to improve our services. Please take a few minutes to complete the written survey that you may receive in the mail after you visit with us. Thank you!        Patient Information     Date Of Birth          1980        About your hospital stay     You were admitted on:  August 1, 2018 You last received care in the:  [facility] PreOP/Phase II    You were discharged on:  August 1, 2018       Who to Call     For medical emergencies, please call 911.  For non-urgent questions about your medical care, please call your primary care provider or clinic, [phone]  For questions related to your surgery, please call your surgery clinic        Attending Provider     Provider Specialty    Gilbert Garcia MD Surgery       Primary Care Provider Office Phone # Fax #    Aldo Del Valle [phone]      Your next 10 appointments already scheduled     Aug 13, 2018  1:00 PM CDT   [facility]    [address] [address] [phone]              Further instructions from your care team                           Same Day Surgery Discharge Instructions  Special Precautions After Surgery - Adult    1. It is not unusual to feel lightheaded or faint, up to 24 hours after surgery or while taking pain medication.  If you have these symptoms; sit for a few minutes before standing and have someone assist you when getting up.  2. You should rest and relax for the next 24 hours and must have someone stay with you for at least 24 hours after your discharge.  3. DO NOT DRIVE any vehicle or operate mechanical equipment for  24 hours following the end of your surgery.  DO NOT DRIVE while taking narcotic pain medications that have been prescribed by your physician.  If you had a limb operated on, you must be able to use it fully to drive.  4. DO NOT drink alcoholic beverages for 24 hours following surgery or while taking prescription pain medication.  5. Drink clear liquids (apple juice, ginger ale, broth, 7-Up, etc.).  Progress to your regular diet as you feel able.  6. Any questions call your physician and do not make important decisions for 24 hours.          __________________________________________________________________________________________________________________________________  IMPORTANT NUMBERS:    [facility] Main Number:  [phone], [phone]  Pharmacy:  [phone]  Same Day Surgery:  [phone], Monday - Friday until 8:30 p.m.  Urgent Care:  [phone]  Emergency Room:  [phone]      [facility]:  [phone]                                                                             Surgery Specialty Clinic:  [phone]           Wash incisions daily with soap and water. Some mild redness or swelling is expected. If draining, cover with dry gauze.    No lifting restrictions.  You may lift as comfort permits.    Okay to use ice pack over wound as necessary for comfort.    Use pain medication as necessary but avoid constipating side effects. Ibuprofen okay but avoid Tylenol as your pain medication already contains this.    Diet as tolerated. No restrictions.    Follow up with Dr Garcia in 1-2 weeks.    I would recommend two weeks of recovery before returning to work, as there is a lot of lifting involved.            Additional Information     If you use hormonal birth control (such as the pill, patch, ring or implants): You'll need a second form of birth control for 7 days (condoms, a diaphragm or contraceptive foam). While in the hospital, you received a medicine called Bridion. Your normal birth  "control will not work as well for a week after taking this medicine.          Pending Results     No orders found from 7/30/2018 to 8/2/2018.            Admission Information     Date & Time Provider Department Dept. Phone    8/1/2018 Gilbert Garcia MD [facility] PreOP/Phase [phone]      Your Vitals Were     Blood Pressure Temperature Respirations Height Weight Last Period    138/86 (Cuff Size: Adult Large) 98.5  F (36.9  C) (Oral) 16 1.753 m (5' 9\") 155.6 kg (343 lb) 07/30/2018    Pulse Oximetry BMI (Body Mass Index)                96% 50.65 kg/m2          MyChart Information     [employer] gives you secure access to your electronic health record. If you see a primary care provider, you can also send messages to your care team and make appointments. If you have questions, please call your primary care clinic.  If you do not have a primary care provider, please call [phone] and they will assist you.        Care EveryWhere ID     This is your Care EveryWhere ID. This could be used by other organizations to access your [facility] medical records  [id number]        Equal Access to Services     KINGA YONUG : Hadii mary Eason, carlitos tarango, qaingrid dubon, steven gonzales . So [facility] [phone].    ATENCIÓN: Si habla español, tiene a blanchard disposición servicios gratuitos de asistencia lingüística. Llame al [phone].    We comply with applicable federal civil rights laws and [place] laws. We do not discriminate on the basis of race, color, national origin, age, disability, sex, sexual orientation, or gender identity.               Review of your medicines      CONTINUE these medicines which may have CHANGED, or have new prescriptions. If we are uncertain of the size of tablets/capsules you have at home, strength may be listed as something that might have changed.        Dose / Directions    * HYDROcodone-acetaminophen 5-325 MG per tablet   Commonly " known as:  MILAGROS   This may have changed:  Another medication with the same name was added. Make sure you understand how and when to take each.        Dose:  1-2 tablet   Take 1-2 tablets by mouth every 6 hours as needed for pain maximum 10 tablet(s) per day   Quantity:  12 tablet   Refills:  0       * HYDROcodone-acetaminophen 5-325 MG per tablet   Commonly known as:  NORCO   This may have changed:  You were already taking a medication with the same name, and this prescription was added. Make sure you understand how and when to take each.   Used for:  Post-op pain        Dose:  1-2 tablet   Take 1-2 tablets by mouth every 4 hours as needed for severe pain   Quantity:  20 tablet   Refills:  0       * Notice:  This list has 2 medication(s) that are the same as other medications prescribed for you. Read the directions carefully, and ask your doctor or other care provider to review them with you.      CONTINUE these medicines which have NOT CHANGED        Dose / Directions    IBUPROFEN PO        Dose:  400 mg   Take 400 mg by mouth every 8 hours as needed for moderate pain   Refills:  0       lisinopril 10 MG tablet   Commonly known as:  PRINIVIL/ZESTRIL   Used for:  Essential hypertension        Dose:  10 mg   Take 1 tablet (10 mg) by mouth daily   Quantity:  90 tablet   Refills:  3       ranitidine 150 MG tablet   Commonly known as:  ZANTAC   Used for:  Atypical chest pain        Dose:  150 mg   Take 1 tablet (150 mg) by mouth 2 times daily   Quantity:  180 tablet   Refills:  3       TYLENOL PO        Dose:  500 mg   Take 500 mg by mouth every 8 hours as needed for mild pain or fever   Refills:  0            Where to get your medicines      Some of these will need a paper prescription and others can be bought over the counter. Ask your nurse if you have questions.     Bring a paper prescription for each of these medications     HYDROcodone-acetaminophen 5-325 MG per tablet                Protect others around you:  Learn how to safely use, store and throw away your medicines at www.disposemymeds.org.        Information about OPIOIDS     PRESCRIPTION OPIOIDS: WHAT YOU NEED TO KNOW   We gave you an opioid (narcotic) pain medicine. It is important to manage your pain, but opioids are not always the best choice. You should first try all the other options your care team gave you. Take this medicine for as short a time (and as few doses) as possible.     These medicines have risks:    DO NOT drive when on new or higher doses of pain medicine. These medicines can affect your alertness and reaction times, and you could be arrested for driving under the influence (DUI). If you need to use opioids long-term, talk to your care team about driving.    DO NOT operate heave machinery    DO NOT do any other dangerous activities while taking these medicines.     DO NOT drink any alcohol while taking these medicines.      If the opioid prescribed includes acetaminophen, DO NOT take with any other medicines that contain acetaminophen. Read all labels carefully. Look for the word  acetaminophen  or  Tylenol.  Ask your pharmacist if you have questions or are unsure.    You can get addicted to pain medicines, especially if you have a history of addiction (chemical, alcohol or substance dependence). Talk to your care team about ways to reduce this risk.    Store your pills in a secure place, locked if possible. We will not replace any lost or stolen medicine. If you don t finish your medicine, please throw away (dispose) as directed by your pharmacist. The [place] Pollution Control Agency has more information about safe disposal: https://www.pca.[facility].[place].us/living-green/managing-unwanted-medications.     All opioids tend to cause constipation. Drink plenty of water and eat foods that have a lot of fiber, such as fruits, vegetables, prune juice, apple juice and high-fiber cereal. Take a laxative (Miralax, milk of magnesia, Colace, Senna) if you  don t move your bowels at least every other day.              Medication List: This is a list of all your medications and when to take them. Check marks below indicate your daily home schedule. Keep this list as a reference.      Medications           Morning Afternoon Evening Bedtime As Needed    * HYDROcodone-acetaminophen 5-325 MG per tablet   Commonly known as:  NORCO   Take 1-2 tablets by mouth every 6 hours as needed for pain maximum 10 tablet(s) per day   Last time this was given:  2 tablets on 8/1/2018  2:35 PM   Next Dose Due:  6:00 PM                                * HYDROcodone-acetaminophen 5-325 MG per tablet   Commonly known as:  NORCO   Take 1-2 tablets by mouth every 4 hours as needed for severe pain   Last time this was given:  2 tablets on 8/1/2018  2:35 PM                                IBUPROFEN PO   Take 400 mg by mouth every 8 hours as needed for moderate pain                                lisinopril 10 MG tablet   Commonly known as:  PRINIVIL/ZESTRIL   Take 1 tablet (10 mg) by mouth daily                                ranitidine 150 MG tablet   Commonly known as:  ZANTAC   Take 1 tablet (150 mg) by mouth 2 times daily                                TYLENOL PO   Take 500 mg by mouth every 8 hours as needed for mild pain or fever                                * Notice:  This list has 2 medication(s) that are the same as other medications prescribed for you. Read the directions carefully, and ask your doctor or other care provider to review them with you.

## 2018-08-01 NOTE — H&P (VIEW-ONLY)
[facility]  [address] [facility] [address]  [phone]  Dept: [phone]    PRE-OP EVALUATION:  Today's date: 2018    Mary Dixon (: 1980) presents for pre-operative evaluation assessment as requested by Dr. Garcia. He requires evaluation and anesthesia risk assessment prior to undergoing surgery/procedure for treatment of cholelithiasis .    Proposed Surgery/ Procedure: LAPAROSCOPIC CHOLECYSTECTOMY WITH CHOLANGIOGRAMS  Date of Surgery/ Procedure: 18  Time of Surgery/ Procedure: 10:30 am   Hospital/Surgical Facility: [facility]  Primary Physician: Aldo Del Valle  Type of Anesthesia Anticipated: General    Patient has a Health Care Directive or Living Will:  YES    1. NO - Do you have a history of heart attack, stroke, stent, bypass or surgery on an artery in the head, neck, heart or legs?  2. NO - Do you ever have any pain or discomfort in your chest?  3. NO - Do you have a history of  Heart Failure?  4. NO - Are you troubled by shortness of breath when: walking on the level, up a slight hill or at night?  5. NO - Do you currently have a cold, bronchitis or other respiratory infection?  6. NO - Do you have a cough, shortness of breath or wheezing?  7. NO - Do you sometimes get pains in the calves of your legs when you walk?  8. NO - Do you or anyone in your family have previous history of blood clots?  9. NO - Do you or does anyone in your family have a serious bleeding problem such as prolonged bleeding following surgeries or cuts?  10. NO - Have you ever had problems with anemia or been told to take iron pills?  11. NO - Have you had any abnormal blood loss such as black, tarry or bloody stools, or abnormal vaginal bleeding?  12. NO - Have you ever had a blood transfusion?  13. NO - Have you or any of your relatives ever had problems with anesthesia?  14. YES - Do you have sleep apnea, excessive snoring or daytime drowsiness? Sleep apnea, has  CPAP  15. NO - Do you have any prosthetic heart valves?  16. NO - Do you have prosthetic joints?  17. NO - Is there any chance that you may be pregnant?      HPI:     HPI related to upcoming procedure: 2.8cm stone       See problem list for active medical problems.  Problems all longstanding and stable, except as noted/documented.  See ROS for pertinent symptoms related to these conditions.                                                                                                                                                          .    MEDICAL HISTORY:     Patient Active Problem List    Diagnosis Date Noted     Atypical chest pain 01/16/2018     Priority: Medium     Being attributed to anxiety       Hirsutism 01/16/2018     Priority: Medium     Mood swings (H) 01/03/2018     Priority: Medium     Morbid obesity (H) 01/03/2018     Priority: Medium     Anxiety 01/03/2018     Priority: Medium     Prediabetes 02/03/2013     Priority: Medium     PMB (postmenopausal bleeding) 12/26/2012     Priority: Medium     HTN (hypertension) 05/18/2012     Priority: Medium     Personal history of ovarian cancer 05/16/2012     Priority: Medium     Last visit 12/26/12 Dr. Fowler  1.) Patient is aware that she is due her EMBx, but declines general exam today due to flu like symptoms. We will therefore wait to do this at her next surveillance visit.    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 3/08  Dr. Petra Kaminski is gyn/onc - at [facility]  Did not remove uterus    Per pt report, she has \"lifting restrictions\" and hasn't been able to go back to work. Says her ob/gyn has been filling out the paperwork. She says she has seen PT.    Transdermal estrogen 100mcg patch - saw repro med - Dr. Faria - see scanned documents    Most recent visit with Dr. Fowler 11/09 - pt is off hormones and is to do withdrawal bleeds 4x/year. Has progesterone for this.   Sent to chiropractor for therapy        Benign intracranial hypertension 12/05/2006     Priority: Medium     Diagnosed in 2005  Not on diamox.  Has seen neuro        Past Medical History:   Diagnosis Date     Benign intracranial hypertension      Mononucleosis      Ovarian cancer (H)     borderline ovarian cancer     Pneumonia      Post-menopausal bleeding      Past Surgical History:   Procedure Laterality Date     APPENDECTOMY  3/6/08     SURGICAL HISTORY OF -   3/6/08    EUA, 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      Current Outpatient Prescriptions   Medication Sig Dispense Refill     Acetaminophen (TYLENOL PO) Take 500 mg by mouth every 8 hours as needed for mild pain or fever       HYDROcodone-acetaminophen (NORCO) 5-325 MG per tablet Take 1-2 tablets by mouth every 6 hours as needed for pain maximum 10 tablet(s) per day (Patient not taking: Reported on 7/30/2018) 12 tablet 0     lisinopril (PRINIVIL/ZESTRIL) 10 MG tablet Take 1 tablet (10 mg) by mouth daily 90 tablet 3     ranitidine (ZANTAC) 150 MG tablet Take 1 tablet (150 mg) by mouth 2 times daily (Patient not taking: Reported on 7/30/2018) 180 tablet 3     OTC products: None, except as noted above    No Known Allergies   Latex Allergy: NO    Social History   Substance Use Topics     Smoking status: Current Every Day Smoker     Packs/day: 0.50     Years: 2.00     Types: Cigarettes     Smokeless tobacco: Never Used     Alcohol use No " "    History   Drug Use No       REVIEW OF SYSTEMS:   CONSTITUTIONAL: NEGATIVE for fever, chills, change in weight  INTEGUMENTARY/SKIN: NEGATIVE for worrisome rashes, moles or lesions  EYES: NEGATIVE for vision changes or irritation  ENT/MOUTH: NEGATIVE for ear, mouth and throat problems  RESP: NEGATIVE for significant cough or SOB  BREAST: NEGATIVE for masses, tenderness or discharge  CV: NEGATIVE for chest pain, palpitations or peripheral edema  GI: NEGATIVE for nausea, abdominal pain, heartburn, or change in bowel habits  : NEGATIVE for frequency, dysuria, or hematuria  MUSCULOSKELETAL: NEGATIVE for significant arthralgias or myalgia  NEURO: NEGATIVE for weakness, dizziness or paresthesias  ENDOCRINE: NEGATIVE for temperature intolerance, skin/hair changes  HEME: NEGATIVE for bleeding problems  PSYCHIATRIC: NEGATIVE for changes in mood or affect    EXAM:   /86  Pulse 83  Temp 97.1  F (36.2  C) (Tympanic)  Ht 5' 9\" (1.753 m)  Wt (!) 343 lb 6.4 oz (155.8 kg)  SpO2 96%  BMI 50.71 kg/m2    GENERAL APPEARANCE: healthy, alert and no distress     EYES: EOMI, PERRL     HENT: ear canals and TM's normal and nose and mouth without ulcers or lesions     NECK: no adenopathy, no asymmetry, masses, or scars and thyroid normal to palpation     RESP: lungs clear to auscultation - no rales, rhonchi or wheezes     CV: regular rates and rhythm, normal S1 S2, no S3 or S4 and no murmur, click or rub     ABDOMEN:  soft, nontender, no HSM or masses and bowel sounds normal     MS: extremities normal- no gross deformities noted, no evidence of inflammation in joints, FROM in all extremities.     SKIN: no suspicious lesions or rashes     NEURO: Normal strength and tone, sensory exam grossly normal, mentation intact and speech normal     PSYCH: mentation appears normal. and affect normal/bright     LYMPHATICS: No cervical adenopathy    DIAGNOSTICS:   EKG: Not indicated due to non-vascular surgery and low risk of event (age <65 " and without cardiac risk factors)  A1c: 8.2 (12/2017)  Normal Stress ECHO 12/2017    Recent Labs   Lab Test  07/27/18   1635  12/17/17   0611   12/16/17   0906   12/27/12   1041   HGB  16.3*  15.2   < >   --    < >   --    PLT  361  308   < >   --    < >   --    INR   --    --    --   0.85*   --    --    NA  139  141   --   139   < >   --    POTASSIUM  3.7  4.0   --   3.8   < >   --    CR  0.68  0.64   --   0.65   < >   --    A1C   --   8.2*   --    --    --   6.5*    < > = values in this interval not displayed.        IMPRESSION:   Reason for surgery/procedure: cholelithiasis  Diagnosis/reason for consult: pre op    The proposed surgical procedure is considered INTERMEDIATE risk.    REVISED CARDIAC RISK INDEX  The patient has the following serious cardiovascular risks for perioperative complications such as (MI, PE, VFib and 3  AV Block):  No serious cardiac risks  INTERPRETATION: 0 risks: Class I (very low risk - 0.4% complication rate)    The patient has the following additional risks for perioperative complications:  Type 2 diabetes  Morbid obesity  Smoker      ICD-10-CM    1. Preop general physical exam Z01.818    2. Gallstones K80.20        RECOMMENDATIONS:       Obstructive Sleep Apnea (or suspected sleep apnea)  Patient is to bring their home CPAP with them on the day of surgery    APPROVAL GIVEN to proceed with proposed procedure, without further diagnostic evaluation       Signed Electronically by: Riri Meyers PA-C    Copy of this evaluation report is provided to requesting physician.    [facility] Preop Guidelines    Revised Cardiac Risk Index

## 2018-08-01 NOTE — ANESTHESIA POSTPROCEDURE EVALUATION
Patient: Mary Dixon    Procedure(s):  Laparoscopic Cholecystectomy - Wound Class: II-Clean Contaminated    Diagnosis:symptomatic cholelithiasis  Diagnosis Additional Information: No value filed.    Anesthesia Type:  General, ETT    Note:  Anesthesia Post Evaluation    Patient location during evaluation: Bedside  Patient participation: Able to fully participate in evaluation  Level of consciousness: awake and alert  Pain management: adequate  Airway patency: patent  Cardiovascular status: acceptable  Respiratory status: acceptable  Hydration status: acceptable  PONV: none     Anesthetic complications: None          Last vitals:  Vitals:    08/01/18 0857   BP: (!) 152/92   Resp: 16   Temp: 36.9  C (98.5  F)   SpO2: 96%         Electronically Signed By: LYLE Palomino CRNA  August 1, 2018  1:33 PM

## 2018-08-01 NOTE — OR NURSING
Large Gall stone removed from gall bladder and given to patient per Dr. Garcia. A picture was taken of stone and placed in patients chart.

## 2018-08-02 ENCOUNTER — TELEPHONE (OUTPATIENT)
Dept: SURGERY | Facility: CLINIC | Age: 38
End: 2018-08-02

## 2018-08-02 RX ORDER — INDOCYANINE GREEN AND WATER 25 MG
KIT INJECTION PRN
Status: SHIPPED | OUTPATIENT
Start: 2018-08-01

## 2018-08-02 NOTE — TELEPHONE ENCOUNTER
Reason for Call:  Form, our goal is to have forms completed with 72 hours, however, some forms may require a visit or additional information.    Type of letter, form or note:  FMLA    Who is the form from?: Patient    Where did the form come from: form was faxed in    What clinic location was the form placed at?: [facility] (ENT, Neurology, Rheumatology, Surgery, Urology, Vascular Surgery)    Where the form was placed: Given to MA/RN    What number is listed as a contact on the form?: [phone]       Additional comments: please complete form and call pt to p/u when done [phone]    Call taken on 8/2/2018 at 1:52 PM by Haley Gonzalez

## 2018-08-03 NOTE — TELEPHONE ENCOUNTER
Forms were filled out and placed on Dr. Garcia's desk to fill in duration of condition section and for him to sign.    Esa HAMMOND CMA

## 2018-08-06 ENCOUNTER — OFFICE VISIT (OUTPATIENT)
Dept: SURGERY | Facility: CLINIC | Age: 38
End: 2018-08-06
Payer: COMMERCIAL

## 2018-08-06 ENCOUNTER — TELEPHONE (OUTPATIENT)
Dept: SURGERY | Facility: CLINIC | Age: 38
End: 2018-08-06

## 2018-08-06 VITALS
BODY MASS INDEX: 43.4 KG/M2 | TEMPERATURE: 99.1 F | HEIGHT: 69 IN | HEART RATE: 77 BPM | DIASTOLIC BLOOD PRESSURE: 108 MMHG | SYSTOLIC BLOOD PRESSURE: 145 MMHG | WEIGHT: 293 LBS

## 2018-08-06 DIAGNOSIS — G89.18 POST-OP PAIN: ICD-10-CM

## 2018-08-06 DIAGNOSIS — Z98.890 POSTOPERATIVE STATE: Primary | ICD-10-CM

## 2018-08-06 PROCEDURE — 99024 POSTOP FOLLOW-UP VISIT: CPT | Performed by: SURGERY

## 2018-08-06 RX ORDER — HYDROCODONE BITARTRATE AND ACETAMINOPHEN 5; 325 MG/1; MG/1
1-2 TABLET ORAL EVERY 4 HOURS PRN
Qty: 10 TABLET | Refills: 0 | Status: SHIPPED | OUTPATIENT
Start: 2018-08-06 | End: 2018-08-14

## 2018-08-06 ASSESSMENT — PAIN SCALES - GENERAL: PAINLEVEL: MILD PAIN (3)

## 2018-08-06 NOTE — TELEPHONE ENCOUNTER
Called to assess pain complaint. Pt states her pain starts upon awakening at level 7 of 10.   States she is still requiring it regularly and only has 1 pill left.  Scheduled pt for a follow up due to residual level of pain still occuring as well as pt reports a significant amount of bruising in her opinion and wishes to know if normal as she states she has a larger body habitus.    Divina Wu RN  [facility] [place]

## 2018-08-06 NOTE — MR AVS SNAPSHOT
"              After Visit Summary   8/6/2018    Mary Dixon    MRN: [id number]           Patient Information     Date Of Birth          1980        Visit Information        Provider Department      8/6/2018 1:30 PM Gilbert Garcia MD [facility]        Today's Diagnoses     Postoperative state    -  1    Post-op pain          Care Instructions    Per Physician's instructions            Follow-ups after your visit        Who to contact     If you have questions or need follow up information about today's clinic visit or your schedule please contact [facility] directly at [phone].  Normal or non-critical lab and imaging results will be communicated to you by [employer], letter or phone within 4 business days after the clinic has received the results. If you do not hear from us within 7 days, please contact the clinic through [employer] or phone. If you have a critical or abnormal lab result, we will notify you by phone as soon as possible.  Submit refill requests through [employer] or call your pharmacy and they will forward the refill request to us. Please allow 3 business days for your refill to be completed.          Additional Information About Your Visit        MyChart Information     [employer] gives you secure access to your electronic health record. If you see a primary care provider, you can also send messages to your care team and make appointments. If you have questions, please call your primary care clinic.  If you do not have a primary care provider, please call [phone] and they will assist you.        Care EveryWhere ID     This is your Care EveryWhere ID. This could be used by other organizations to access your [facility] medical records  [id number]        Your Vitals Were     Pulse Temperature Height Last Period BMI (Body Mass Index)       77 99.1  F (37.3  C) (Tympanic) 1.753 m (5' 9\") 07/30/2018 50.65 kg/m2        Blood Pressure from Last 3 Encounters:   08/06/18 (!) " 145/108   08/01/18 138/86   07/30/18 125/86    Weight from Last 3 Encounters:   08/06/18 (!) 155.6 kg (343 lb)   08/01/18 (!) 155.6 kg (343 lb)   07/30/18 (!) 156.5 kg (345 lb)              Today, you had the following     No orders found for display         Today's Medication Changes          These changes are accurate as of 8/6/18  2:08 PM.  If you have any questions, ask your nurse or doctor.               These medicines have changed or have updated prescriptions.        Dose/Directions    HYDROcodone-acetaminophen 5-325 MG per tablet   Commonly known as:  NORCO   This may have changed:  Another medication with the same name was removed. Continue taking this medication, and follow the directions you see here.   Used for:  Post-op pain   Changed by:  Gilbert Garcia MD        Dose:  1-2 tablet   Take 1-2 tablets by mouth every 4 hours as needed for severe pain   Quantity:  10 tablet   Refills:  0            Where to get your medicines      Some of these will need a paper prescription and others can be bought over the counter.  Ask your nurse if you have questions.     Bring a paper prescription for each of these medications     HYDROcodone-acetaminophen 5-325 MG per tablet               Information about OPIOIDS     PRESCRIPTION OPIOIDS: WHAT YOU NEED TO KNOW   We gave you an opioid (narcotic) pain medicine. It is important to manage your pain, but opioids are not always the best choice. You should first try all the other options your care team gave you. Take this medicine for as short a time (and as few doses) as possible.     These medicines have risks:    DO NOT drive when on new or higher doses of pain medicine. These medicines can affect your alertness and reaction times, and you could be arrested for driving under the influence (DUI). If you need to use opioids long-term, talk to your care team about driving.    DO NOT operate heave machinery    DO NOT do any other dangerous activities while taking these  medicines.     DO NOT drink any alcohol while taking these medicines.      If the opioid prescribed includes acetaminophen, DO NOT take with any other medicines that contain acetaminophen. Read all labels carefully. Look for the word  acetaminophen  or  Tylenol.  Ask your pharmacist if you have questions or are unsure.    You can get addicted to pain medicines, especially if you have a history of addiction (chemical, alcohol or substance dependence). Talk to your care team about ways to reduce this risk.    Store your pills in a secure place, locked if possible. We will not replace any lost or stolen medicine. If you don t finish your medicine, please throw away (dispose) as directed by your pharmacist. The [place] Pollution Control Agency has more information about safe disposal: https://www.pca.[facility].[place].us/living-green/managing-unwanted-medications.     All opioids tend to cause constipation. Drink plenty of water and eat foods that have a lot of fiber, such as fruits, vegetables, prune juice, apple juice and high-fiber cereal. Take a laxative (Miralax, milk of magnesia, Colace, Senna) if you don t move your bowels at least every other day.          Primary Care Provider Office Phone # Fax #    Aldo Del Valle [phone]       [address] [place] [address]        Equal Access to Services     KINGA YOUNG AH: Hadii mary ku hadasho Soomaali, waaxda luqadaha, qaybta kaalmada adeegyada, waxay rosibel serrato. So [facility] [phone].    ATENCIÓN: Si habla español, tiene a blanchard disposición servicios gratuitos de asistencia lingüística. Llame al [phone].    We comply with applicable federal civil rights laws and [place] laws. We do not discriminate on the basis of race, color, national origin, age, disability, sex, sexual orientation, or gender identity.            Thank you!     Thank you for choosing [facility]  for your care. Our goal is always to  provide you with excellent care. Hearing back from our patients is one way we can continue to improve our services. Please take a few minutes to complete the written survey that you may receive in the mail after your visit with us. Thank you!             Your Updated Medication List - Protect others around you: Learn how to safely use, store and throw away your medicines at www.disposemymeds.org.          This list is accurate as of 8/6/18  2:08 PM.  Always use your most recent med list.                   Brand Name Dispense Instructions for use Diagnosis    HYDROcodone-acetaminophen 5-325 MG per tablet    NORCO    10 tablet    Take 1-2 tablets by mouth every 4 hours as needed for severe pain    Post-op pain       IBUPROFEN PO      Take 400 mg by mouth every 8 hours as needed for moderate pain        lisinopril 10 MG tablet    PRINIVIL/ZESTRIL    90 tablet    Take 1 tablet (10 mg) by mouth daily    Essential hypertension       ranitidine 150 MG tablet    ZANTAC    180 tablet    Take 1 tablet (150 mg) by mouth 2 times daily    Atypical chest pain       TYLENOL PO      Take 500 mg by mouth every 8 hours as needed for mild pain or fever

## 2018-08-06 NOTE — TELEPHONE ENCOUNTER
Forms completed and able to be picked up. Tried to call patient but mail box is full.  Swapna Rodríguez CMA

## 2018-08-06 NOTE — LETTER
"    8/6/2018         RE: Mary Dixon  [address] [address]        Dear Colleague,    Thank you for referring your patient, Mary Dixon, to the [facility]. Please see a copy of my visit note below.    Patient is in for a postop check. He has been 5 days since surgery. She had a laparoscopic cholecystectomy. The procedure was uncomplicated. She feels like she is improving on a daily basis however she still is in a significant amount of pain. She has not been taking ibuprofen as directed because she thought that that she was told not to. She has one pain pill left from surgery. She was given 20 Norco.    No change in the color of her urine or stool. She just has pain in the right upper quadrant. She feels like she had a \"tooth removed\"    On exam: Residual healing fine. No sign of infection or hernia formation. There is a little bit of bruising in the right lower quadrant, but nothing unusual.    Pathology is pending. I have no concerns regarding that report, but she will be informed.    Impression: Satisfactory postoperative course. I advised her to start using ibuprofen 3 tablets 4 times a day.. I gave her Norco 10 tablets with instructions that she is not to get any more narcotics.    I changed her paperwork so she could have 2 weeks off total from surgery. I think that's reasonable. I will see her back as needed. She does not need to keep her next appointment on Monday.    Gilbert Garcia MD FACS    Again, thank you for allowing me to participate in the care of your patient.        Sincerely,        Gilbert Garcia MD    "

## 2018-08-06 NOTE — NURSING NOTE
"Initial BP (!) 145/108 (BP Location: Left arm, Patient Position: Sitting, Cuff Size: Adult Regular)  Pulse 77  Temp 99.1  F (37.3  C) (Tympanic)  Ht 1.753 m (5' 9\")  Wt (!) 155.6 kg (343 lb)  LMP 07/30/2018  BMI 50.65 kg/m2 Estimated body mass index is 50.65 kg/(m^2) as calculated from the following:    Height as of this encounter: 1.753 m (5' 9\").    Weight as of this encounter: 155.6 kg (343 lb). .    Swapna Rodríguez CMA    "

## 2018-08-06 NOTE — PROGRESS NOTES
"Patient is in for a postop check. He has been 5 days since surgery. She had a laparoscopic cholecystectomy. The procedure was uncomplicated. She feels like she is improving on a daily basis however she still is in a significant amount of pain. She has not been taking ibuprofen as directed because she thought that that she was told not to. She has one pain pill left from surgery. She was given 20 Norco.    No change in the color of her urine or stool. She just has pain in the right upper quadrant. She feels like she had a \"tooth removed\"    On exam: Residual healing fine. No sign of infection or hernia formation. There is a little bit of bruising in the right lower quadrant, but nothing unusual.    Pathology is pending. I have no concerns regarding that report, but she will be informed.    Impression: Satisfactory postoperative course. I advised her to start using ibuprofen 3 tablets 4 times a day.. I gave her Norco 10 tablets with instructions that she is not to get any more narcotics.    I changed her paperwork so she could have 2 weeks off total from surgery. I think that's reasonable. I will see her back as needed. She does not need to keep her next appointment on Monday.    Gilbert Garcia MD FACS  "

## 2018-08-06 NOTE — TELEPHONE ENCOUNTER
Reason for Call:  Other medication    Detailed comments: Pt would like a refill of [place], states she is still in pain, can  in pharmacy here, please call to advise   erePhone Number Patient can be reached at: Home number on file [phone] (home)    Best Time: today    Can we leave a detailed message on this number? YES    Call taken on 8/6/2018 at 10:22 AM by Shanika Palacios

## 2018-08-07 LAB — COPATH REPORT: NORMAL

## 2018-08-11 ENCOUNTER — APPOINTMENT (OUTPATIENT)
Dept: CT IMAGING | Facility: CLINIC | Age: 38
End: 2018-08-11
Attending: EMERGENCY MEDICINE
Payer: COMMERCIAL

## 2018-08-11 ENCOUNTER — APPOINTMENT (OUTPATIENT)
Dept: GENERAL RADIOLOGY | Facility: CLINIC | Age: 38
End: 2018-08-11
Attending: EMERGENCY MEDICINE
Payer: COMMERCIAL

## 2018-08-11 ENCOUNTER — HOSPITAL ENCOUNTER (OUTPATIENT)
Facility: CLINIC | Age: 38
Setting detail: OBSERVATION
Discharge: HOME | End: 2018-08-12
Attending: EMERGENCY MEDICINE | Admitting: SURGERY
Payer: COMMERCIAL

## 2018-08-11 DIAGNOSIS — D72.829 LEUKOCYTOSIS, UNSPECIFIED TYPE: ICD-10-CM

## 2018-08-11 DIAGNOSIS — M79.18 MYOFASCIAL PAIN: Primary | ICD-10-CM

## 2018-08-11 DIAGNOSIS — K29.80 DUODENITIS: ICD-10-CM

## 2018-08-11 DIAGNOSIS — R10.11 ABDOMINAL PAIN, RIGHT UPPER QUADRANT: ICD-10-CM

## 2018-08-11 DIAGNOSIS — I10 HYPERTENSION, UNSPECIFIED TYPE: ICD-10-CM

## 2018-08-11 LAB
ALBUMIN SERPL-MCNC: 3.5 G/DL (ref 3.4–5)
ALBUMIN UR-MCNC: 10 MG/DL
ALP SERPL-CCNC: 120 U/L (ref 40–150)
ALT SERPL W P-5'-P-CCNC: 23 U/L (ref 0–50)
ANION GAP SERPL CALCULATED.3IONS-SCNC: 11 MMOL/L (ref 3–14)
APPEARANCE UR: CLEAR
AST SERPL W P-5'-P-CCNC: 11 U/L (ref 0–45)
BASOPHILS # BLD AUTO: 0 10E9/L (ref 0–0.2)
BASOPHILS NFR BLD AUTO: 0.1 %
BILIRUB SERPL-MCNC: 0.4 MG/DL (ref 0.2–1.3)
BILIRUB UR QL STRIP: NEGATIVE
BUN SERPL-MCNC: 15 MG/DL (ref 7–30)
CALCIUM SERPL-MCNC: 8.8 MG/DL (ref 8.5–10.1)
CHLORIDE SERPL-SCNC: 104 MMOL/L (ref 94–109)
CO2 SERPL-SCNC: 25 MMOL/L (ref 20–32)
COLOR UR AUTO: YELLOW
CREAT SERPL-MCNC: 0.7 MG/DL (ref 0.52–1.04)
DIFFERENTIAL METHOD BLD: ABNORMAL
EOSINOPHIL # BLD AUTO: 0.3 10E9/L (ref 0–0.7)
EOSINOPHIL NFR BLD AUTO: 2.1 %
ERYTHROCYTE [DISTWIDTH] IN BLOOD BY AUTOMATED COUNT: 13.4 % (ref 10–15)
GFR SERPL CREATININE-BSD FRML MDRD: >90 ML/MIN/1.7M2
GLUCOSE SERPL-MCNC: 206 MG/DL (ref 70–99)
GLUCOSE UR STRIP-MCNC: 70 MG/DL
HCG SERPL QL: NEGATIVE
HCT VFR BLD AUTO: 41.2 % (ref 35–47)
HGB BLD-MCNC: 13.9 G/DL (ref 11.7–15.7)
HGB UR QL STRIP: NEGATIVE
IMM GRANULOCYTES # BLD: 0 10E9/L (ref 0–0.4)
IMM GRANULOCYTES NFR BLD: 0.2 %
KETONES UR STRIP-MCNC: 10 MG/DL
LEUKOCYTE ESTERASE UR QL STRIP: ABNORMAL
LIPASE SERPL-CCNC: 113 U/L (ref 73–393)
LYMPHOCYTES # BLD AUTO: 2.2 10E9/L (ref 0.8–5.3)
LYMPHOCYTES NFR BLD AUTO: 14.7 %
MAGNESIUM SERPL-MCNC: 2 MG/DL (ref 1.6–2.3)
MCH RBC QN AUTO: 30.7 PG (ref 26.5–33)
MCHC RBC AUTO-ENTMCNC: 33.7 G/DL (ref 31.5–36.5)
MCV RBC AUTO: 91 FL (ref 78–100)
MONOCYTES # BLD AUTO: 0.8 10E9/L (ref 0–1.3)
MONOCYTES NFR BLD AUTO: 5.1 %
MUCOUS THREADS #/AREA URNS LPF: PRESENT /LPF
NEUTROPHILS # BLD AUTO: 11.4 10E9/L (ref 1.6–8.3)
NEUTROPHILS NFR BLD AUTO: 77.8 %
NITRATE UR QL: NEGATIVE
NRBC # BLD AUTO: 0 10*3/UL
NRBC BLD AUTO-RTO: 0 /100
PH UR STRIP: 5.5 PH (ref 5–7)
PLATELET # BLD AUTO: 368 10E9/L (ref 150–450)
POTASSIUM SERPL-SCNC: 3.8 MMOL/L (ref 3.4–5.3)
PROT SERPL-MCNC: 7.5 G/DL (ref 6.8–8.8)
RBC # BLD AUTO: 4.53 10E12/L (ref 3.8–5.2)
RBC #/AREA URNS AUTO: 2 /HPF (ref 0–2)
SODIUM SERPL-SCNC: 140 MMOL/L (ref 133–144)
SOURCE: ABNORMAL
SP GR UR STRIP: 1.03 (ref 1–1.03)
SQUAMOUS #/AREA URNS AUTO: 3 /HPF (ref 0–1)
UROBILINOGEN UR STRIP-MCNC: 2 MG/DL (ref 0–2)
WBC # BLD AUTO: 14.7 10E9/L (ref 4–11)
WBC #/AREA URNS AUTO: 6 /HPF (ref 0–5)

## 2018-08-11 PROCEDURE — 99285 EMERGENCY DEPT VISIT HI MDM: CPT | Mod: Z6 | Performed by: EMERGENCY MEDICINE

## 2018-08-11 PROCEDURE — 71045 X-RAY EXAM CHEST 1 VIEW: CPT

## 2018-08-11 PROCEDURE — 74177 CT ABD & PELVIS W/CONTRAST: CPT

## 2018-08-11 PROCEDURE — 81001 URINALYSIS AUTO W/SCOPE: CPT | Performed by: EMERGENCY MEDICINE

## 2018-08-11 PROCEDURE — 85025 COMPLETE CBC W/AUTO DIFF WBC: CPT | Performed by: EMERGENCY MEDICINE

## 2018-08-11 PROCEDURE — 87086 URINE CULTURE/COLONY COUNT: CPT | Performed by: EMERGENCY MEDICINE

## 2018-08-11 PROCEDURE — 96374 THER/PROPH/DIAG INJ IV PUSH: CPT | Performed by: EMERGENCY MEDICINE

## 2018-08-11 PROCEDURE — 25000128 H RX IP 250 OP 636: Performed by: STUDENT IN AN ORGANIZED HEALTH CARE EDUCATION/TRAINING PROGRAM

## 2018-08-11 PROCEDURE — 80053 COMPREHEN METABOLIC PANEL: CPT | Performed by: EMERGENCY MEDICINE

## 2018-08-11 PROCEDURE — 96361 HYDRATE IV INFUSION ADD-ON: CPT | Performed by: EMERGENCY MEDICINE

## 2018-08-11 PROCEDURE — 99285 EMERGENCY DEPT VISIT HI MDM: CPT | Mod: 25 | Performed by: EMERGENCY MEDICINE

## 2018-08-11 PROCEDURE — 83735 ASSAY OF MAGNESIUM: CPT | Performed by: EMERGENCY MEDICINE

## 2018-08-11 PROCEDURE — 83690 ASSAY OF LIPASE: CPT | Performed by: EMERGENCY MEDICINE

## 2018-08-11 PROCEDURE — 96375 TX/PRO/DX INJ NEW DRUG ADDON: CPT | Performed by: EMERGENCY MEDICINE

## 2018-08-11 PROCEDURE — 25000128 H RX IP 250 OP 636: Performed by: EMERGENCY MEDICINE

## 2018-08-11 PROCEDURE — 84703 CHORIONIC GONADOTROPIN ASSAY: CPT | Performed by: EMERGENCY MEDICINE

## 2018-08-11 PROCEDURE — 83036 HEMOGLOBIN GLYCOSYLATED A1C: CPT | Performed by: EMERGENCY MEDICINE

## 2018-08-11 RX ORDER — ONDANSETRON 2 MG/ML
4 INJECTION INTRAMUSCULAR; INTRAVENOUS ONCE
Status: COMPLETED | OUTPATIENT
Start: 2018-08-11 | End: 2018-08-11

## 2018-08-11 RX ORDER — SODIUM CHLORIDE 9 MG/ML
1000 INJECTION, SOLUTION INTRAVENOUS CONTINUOUS
Status: DISCONTINUED | OUTPATIENT
Start: 2018-08-11 | End: 2018-08-12

## 2018-08-11 RX ORDER — IOPAMIDOL 755 MG/ML
135 INJECTION, SOLUTION INTRAVASCULAR ONCE
Status: COMPLETED | OUTPATIENT
Start: 2018-08-11 | End: 2018-08-11

## 2018-08-11 RX ORDER — MORPHINE SULFATE 4 MG/ML
4 INJECTION, SOLUTION INTRAMUSCULAR; INTRAVENOUS ONCE
Status: COMPLETED | OUTPATIENT
Start: 2018-08-11 | End: 2018-08-11

## 2018-08-11 RX ADMIN — SODIUM CHLORIDE 1000 ML: 9 INJECTION, SOLUTION INTRAVENOUS at 22:55

## 2018-08-11 RX ADMIN — MORPHINE SULFATE 4 MG: 4 INJECTION INTRAVENOUS at 23:00

## 2018-08-11 RX ADMIN — IOPAMIDOL 135 ML: 755 INJECTION, SOLUTION INTRAVENOUS at 23:59

## 2018-08-11 RX ADMIN — ONDANSETRON 4 MG: 2 INJECTION, SOLUTION INTRAMUSCULAR; INTRAVENOUS at 22:58

## 2018-08-11 NOTE — IP AVS SNAPSHOT
[address] [address]    Phone:  [phone]                                       After Visit Summary   8/11/2018    Mary Dixon    MRN: [id number]           After Visit Summary Signature Page     I have received my discharge instructions, and my questions have been answered. I have discussed any challenges I see with this plan with the nurse or doctor.    ..........................................................................................................................................  Patient/Patient Representative Signature      ..........................................................................................................................................  Patient Representative Print Name and Relationship to Patient    ..................................................               ................................................  Date                                            Time    ..........................................................................................................................................  Reviewed by Signature/Title    ...................................................              ..............................................  Date                                                            Time

## 2018-08-11 NOTE — LETTER
Return to  Work Release    Date: 8/12/2018      Name: Mary Dixon                       YOB: 1980    Medical Record Number: [id number]    The patient was seen at: [facility]    Restrictions if any: No lifting heavy weights (>10-15lbs) for 4wks after surgery date. Do not drive while on narcotics.      Mony Majano MD/MPH  Plastic Surgery PGY2

## 2018-08-11 NOTE — LETTER
Date: 8/12/2018      Name: Mary Dixon                       YOB: 1980    Medical Record Number: [id number]    The patient was seen at: [facility]    Patient is unable to return to work for 1 week after discharge from our hospital (8/12/2018) given that she is unable to lift anything over 10-15lbs for 4wks since her surgery date (7/28/2018) and is not to drive while taking narcotics. Once the patient does not require use of narcotics for pain control, she will be able to drive. She will follow up with our surgeon in 1 week.      Mony Majano MD/MPH  Plastic Surgery PGY2

## 2018-08-11 NOTE — IP AVS SNAPSHOT
MRN:[id number]                      After Visit Summary   8/11/2018    Mary Dixon    MRN: [id number]           Thank you!     Thank you for choosing [facility] for your care. Our goal is always to provide you with excellent care. Hearing back from our patients is one way we can continue to improve our services. Please take a few minutes to complete the written survey that you may receive in the mail after you visit with us. Thank you!        Patient Information     Date Of Birth          1980        About your hospital stay     You were admitted on:  August 12, 2018 You last received care in the:  [address] [facility]    You were discharged on:  August 12, 2018        Reason for your hospital stay       Pain after cholecystectomy.                  Who to Call     For medical emergencies, please call 911.  For non-urgent questions about your medical care, please call your primary care provider or clinic, [phone]          Attending Provider     Provider Specialty    Tobias Guerra MD Emergency Medicine    Sidney, Violeta Downey MD Surgery       Primary Care Provider Office Phone # Fax #    Aldo Del Valle [phone]      After Care Instructions     Discharge Instructions       Discharge Instructions:    Activity  Resume activity as tolerated. Please continue ambulating at least 3 times per day.    Incisions  Your incisions are healing well with no evidence of infection. Please continue routine incisional care.     Medications  - Please start taking Protonix, 40 mg every morning   - You will be discharged with a limited amount of narcotic pain medication  - Do not take any additional Tylenol (acetaminophen) while using a narcotic pain medication which includes acetaminophen  - Do not take more than 4,000mg of acetaminophen in any 24 hour period, as this can cause liver damage  - Take stool softeners such as Senna or Miralax while you are using narcotics, but  stop if you develop diarrhea  - Wean yourself off of narcotic pain medications    Follow-Up:  - Call your primary care provider to touch base regarding your recent admission.     - Call or return sooner than your regularly scheduled visit if you develop any of the following: fever >101.5, uncontrolled pain, uncontrolled nausea or vomiting, as well as increased redness, swelling, or drainage from your wound.   -  A nurse from the General Surgery Clinic will contact you 24 hours, or next business day, after your discharge from the hospital.  If you have questions or to schedule a follow up appointment please call the General Surgery Clinic at [phone].  Call [phone] and ask to speak with the Surgery resident on call if you are having troubles in the evenings, at night, or on the weekend.  -  If you are receiving home care please inform your home care nurse of our contact number.                  Follow-up Appointments     Adult [facility] Follow-up and recommended labs and tests       Please follow up with your primary care provider in 1-2 weeks. You HbA1c was elevated and you may have new onset diabetes.    Please follow up in GI clinic as an outpatient to schedule an EGD to evaluate duodenitis seen on CT scan.            Adult [facility] Follow-up and recommended labs and tests       Follow up with Dr. Diaz in 1 week  for hospital follow- up.    Appointments on [facility] and/or [facility] (with [facility] or [facility] provider or service). Call [phone] if you haven't heard regarding these appointments within 7 days of discharge.                  Further instructions from your care team       Discharge Instructions:    Activity  Resume activity as tolerated. Please continue ambulating at least 3 times per day.    Incisions  Your incisions are healing well with no evidence of infection. Please continue routine incisional care.     Medications  - Please start taking Protonix, 40 mg every morning   - You will be  discharged with a limited amount of narcotic pain medication  - Do not take any additional Tylenol (acetaminophen) while using a narcotic pain medication which includes acetaminophen  - Do not take more than 4,000mg of acetaminophen in any 24 hour period, as this can cause liver damage  - Take stool softeners such as Senna or Miralax while you are using narcotics, but stop if you develop diarrhea  - Wean yourself off of narcotic pain medications    Follow-Up:  - Call your primary care provider to touch base regarding your recent admission.     - Call or return sooner than your regularly scheduled visit if you develop any of the following: fever >101.5, uncontrolled pain, uncontrolled nausea or vomiting, as well as increased redness, swelling, or drainage from your wound.   -  A nurse from the General Surgery Clinic will contact you 24 hours, or next business day, after your discharge from the hospital.  If you have questions or to schedule a follow up appointment please call the General Surgery Clinic at [phone].  Call [phone] and ask to speak with the Surgery resident on call if you are having troubles in the evenings, at night, or on the weekend.  -  If you are receiving home care please inform your home care nurse of our contact number.      Pending Results     Date and Time Order Name Status Description    8/12/2018 0742 EKG 12-lead, tracing only Preliminary     8/11/2018 2248 Urine Culture Aerobic Bacterial Preliminary             Statement of Approval     Ordered          08/12/18 1106  I have reviewed and agree with all the recommendations and orders detailed in this document.  EFFECTIVE NOW     Approved and electronically signed by:  Mony Majano MD             Admission Information     Date & Time Provider Department Dept. Phone    8/11/2018 Violeta Diaz MD [address] [facility] [phone]      Your Vitals Were     Blood Pressure Pulse Temperature Respirations Height Weight     "147/103 80 98  F (36.7  C) (Oral) 16 1.753 m (5' 9\") 160 kg (352 lb 12.8 oz)    Last Period Pulse Oximetry BMI (Body Mass Index)             07/30/2018 95% 52.1 kg/m2         MyChart Information     [employer] gives you secure access to your electronic health record. If you see a primary care provider, you can also send messages to your care team and make appointments. If you have questions, please call your primary care clinic.  If you do not have a primary care provider, please call [phone] and they will assist you.        Care EveryWhere ID     This is your Care EveryWhere ID. This could be used by other organizations to access your [facility] medical records  [id number]        Equal Access to Services     KINGA YOUNG : Rowena Eason, carlitos tarango, adrián dubon, steven serrato. So [facility] [phone].    ATENCIÓN: Si habla español, tiene a blanchard disposición servicios gratuitos de asistencia lingüística. Llame al [phone].    We comply with applicable federal civil rights laws and [place] laws. We do not discriminate on the basis of race, color, national origin, age, disability, sex, sexual orientation, or gender identity.               Review of your medicines      START taking        Dose / Directions    methocarbamol 500 MG tablet   Commonly known as:  ROBAXIN   Used for:  Abdominal pain, right upper quadrant        Dose:  500 mg   Take 1 tablet (500 mg) by mouth 4 times daily   Quantity:  240 tablet   Refills:  0       oxyCODONE IR 5 MG tablet   Commonly known as:  ROXICODONE   Used for:  Myofascial pain, Abdominal pain, right upper quadrant        Dose:  5-10 mg   Take 1-2 tablets (5-10 mg) by mouth every 4 hours as needed for other (pain control or improvement in physical function.)   Quantity:  20 tablet   Refills:  0       pantoprazole 40 MG EC tablet   Commonly known as:  PROTONIX   Used for:  Abdominal pain, right upper quadrant        Dose:  " 40 mg   Start taking on:  8/13/2018   Take 1 tablet (40 mg) by mouth every morning (before breakfast)   Quantity:  30 tablet   Refills:  1         CONTINUE these medicines which have NOT CHANGED        Dose / Directions    HYDROcodone-acetaminophen 5-325 MG per tablet   Commonly known as:  NORCO   Used for:  Post-op pain        Dose:  1-2 tablet   Take 1-2 tablets by mouth every 4 hours as needed for severe pain   Quantity:  10 tablet   Refills:  0       IBUPROFEN PO        Dose:  400 mg   Take 400 mg by mouth every 8 hours as needed for moderate pain   Refills:  0       lisinopril 10 MG tablet   Commonly known as:  PRINIVIL/ZESTRIL   Used for:  Essential hypertension        Dose:  10 mg   Take 1 tablet (10 mg) by mouth daily   Quantity:  90 tablet   Refills:  3       TYLENOL PO        Dose:  500 mg   Take 500 mg by mouth every 8 hours as needed for mild pain or fever   Refills:  0         STOP taking     ranitidine 150 MG tablet   Commonly known as:  ZANTAC                Where to get your medicines      These medications were sent to [facility] [address] [address]     Phone:  [phone]     pantoprazole 40 MG EC tablet         Some of these will need a paper prescription and others can be bought over the counter. Ask your nurse if you have questions.     Bring a paper prescription for each of these medications     methocarbamol 500 MG tablet    oxyCODONE IR 5 MG tablet                Protect others around you: Learn how to safely use, store and throw away your medicines at www.disposemymeds.org.        Information about OPIOIDS     PRESCRIPTION OPIOIDS: WHAT YOU NEED TO KNOW   We gave you an opioid (narcotic) pain medicine. It is important to manage your pain, but opioids are not always the best choice. You should first try all the other options your care team gave you. Take this medicine for as short a time (and as few doses) as  possible.    Some activities can increase your pain, such as bandage changes or therapy sessions. It may help to take your pain medicine 30 to 60 minutes before these activities. Reduce your stress by getting enough sleep, working on hobbies you enjoy and practicing relaxation or meditation. Talk to your care team about ways to manage your pain beyond prescription opioids.    These medicines have risks:    DO NOT drive when on new or higher doses of pain medicine. These medicines can affect your alertness and reaction times, and you could be arrested for driving under the influence (DUI). If you need to use opioids long-term, talk to your care team about driving.    DO NOT operate heavy machinery    DO NOT do any other dangerous activities while taking these medicines.    DO NOT drink any alcohol while taking these medicines.     If the opioid prescribed includes acetaminophen, DO NOT take with any other medicines that contain acetaminophen. Read all labels carefully. Look for the word  acetaminophen  or  Tylenol.  Ask your pharmacist if you have questions or are unsure.    You can get addicted to pain medicines, especially if you have a history of addiction (chemical, alcohol or substance dependence). Talk to your care team about ways to reduce this risk.    All opioids tend to cause constipation. Drink plenty of water and eat foods that have a lot of fiber, such as fruits, vegetables, prune juice, apple juice and high-fiber cereal. Take a laxative (Miralax, milk of magnesia, Colace, Senna) if you don t move your bowels at least every other day. Other side effects include upset stomach, sleepiness, dizziness, throwing up, tolerance (needing more of the medicine to have the same effect), physical dependence and slowed breathing.    Store your pills in a secure place, locked if possible. We will not replace any lost or stolen medicine. If you don t finish your medicine, please throw away (dispose) as directed by your  pharmacist. The [place] [employer]ution [employer] has more information about safe disposal: https://www.pca.state.[place]/living-green/managing-unwanted-medications             Medication List: This is a list of all your medications and when to take them. Check marks below indicate your daily home schedule. Keep this list as a reference.      Medications           Morning Afternoon Evening Bedtime As Needed    HYDROcodone-acetaminophen 5-325 MG per tablet   Commonly known as:  NORCO   Take 1-2 tablets by mouth every 4 hours as needed for severe pain                                IBUPROFEN PO   Take 400 mg by mouth every 8 hours as needed for moderate pain                                lisinopril 10 MG tablet   Commonly known as:  PRINIVIL/ZESTRIL   Take 1 tablet (10 mg) by mouth daily                                methocarbamol 500 MG tablet   Commonly known as:  ROBAXIN   Take 1 tablet (500 mg) by mouth 4 times daily   Last time this was given:  500 mg on 8/12/2018 11:45 AM                                oxyCODONE IR 5 MG tablet   Commonly known as:  ROXICODONE   Take 1-2 tablets (5-10 mg) by mouth every 4 hours as needed for other (pain control or improvement in physical function.)   Last time this was given:  5 mg on 8/12/2018 10:56 AM                                pantoprazole 40 MG EC tablet   Commonly known as:  PROTONIX   Take 1 tablet (40 mg) by mouth every morning (before breakfast)   Start taking on:  8/13/2018   Last time this was given:  40 mg on 8/12/2018  8:17 AM                                TYLENOL PO   Take 500 mg by mouth every 8 hours as needed for mild pain or fever   Last time this was given:  650 mg on 8/12/2018  8:17 AM                                          More Information        Managing Post-Op Pain at Home  Pain is expected after surgery. Know that you have a right to have this pain controlled. Managing pain helps you recover faster. Less pain means you can be active sooner. It also  means less stress on the body and mind, which will help your body heal. When you go home after surgery, you will take charge of your pain management.  What is post-op pain?  Pain after surgery (post-op pain) is normal. How much pain you feel depends on the surgery. Your use of pain medicines and your sensitivity to pain are also factors. Each person feels pain differently. So try not to compare your pain with someone else s. Your healthcare team will need to know how you are feeling. Be honest. If you are in pain, say so.  Measuring your pain  A pain scale helps you rate pain intensity. In the scale, 0 means no pain, and 10 is the worst pain possible. Pain scales are not used to compare your pain with another person's pain. A pain scale is used only to measure how your pain changes for you. You should rate your pain every few hours. You may feel some pain even with medicines. It is important to tell your healthcare provider if medicines don't reduce the pain. Be sure to mention if the pain suddenly increases or changes.     Your recovery  Your first hours at home, you may feel groggy or tired from the medicines given during surgery. As pain management methods used during surgery wear off, pain may increase. So be sure not to skip a dose of prescribed medicine. In fact, set an alarm or have someone remind you when it s time to take your medicine. During this time, try to rest, even if you feel pretty good. Within the first 24 hours, a nurse or other healthcare provider is likely to call and ask how you re doing.   Medicines for pain  Medicines can help to block pain, limit swelling and control related problems. You may be given more than one medicine to treat your pain. Medicines may be changed as you feel better, or if they cause side effects.   Pain medicine can be given in several ways: by injection, by mouth, as a patch, or as a suppository.  Medicines What they do Possible side effects   Non-steroidal  anti-inflammatory drugs (NSAIDs) Reduce mild to moderate pain. They also help reduce swelling. Nausea, stomach and digestive problems, and kidney and liver problems. Certain NSAIDs may increase the risk for cardiovascular disease in some people.   Opioids (morphine and similar medicines often called narcotics) Reduce moderate to severe pain Nausea, vomiting, itching, drowsiness, constipation, slowed or shallow breathing   Other pain relievers (analgesics) Reduce mild to severe pain Constipation, nausea, dizziness, drowsiness, kidney and liver problems   Anti-vomiting medicine (antiemetics) Manage nausea Dizziness, drowsiness, muscle spasms   Seizure medicines (anticonvulsants) Manage nerve-related pain Drowsiness, dizziness, liver problems   Medicines for depression (antidepressants) Manage chronic pain Dry mouth, drowsiness, dizziness, constipation   Non-medicine pain relief  Medicines are not the only way to manage pain after surgery.   You can use ice to help reduce swelling and pain. Use a cold pack or bag of ice cubes wrapped in a thin cloth. Never put ice directly on your skin. Use the ice for up to 20 minutes at a time every 3 to 4 hours.   You can also reduce swelling and pain by keeping the operated area above the level of your heart if you can. This helps blood and other fluids drain from the area.   You can also use relaxation to reduce pain. Try these techniques:    Visualization or guided imagery. You picture yourself in a quiet, peaceful place to help take your mind off the pain.    Progressive body relaxation. Starting at your feet, you clench and release your muscles. Work up your body slowly until you reach your neck and face.    Deep breathing. Breathe in deeply and hold your breath for a few seconds. Then exhale slowly to help relax your body.   Tips for controlling pain    Give pain medicine time to work. Most pain relievers taken by mouth need at least 20 to 30 minutes to take effect. They may  not reach their maximum effect for close to an hour.     Take pain medicine at regular times as directed. Don t wait until the pain gets bad to take it.    Get plenty of rest. Taking your medicine at night may help you get a good night s rest.    If pain lessens, try taking your medicine less often or in smaller doses.  Safety tips for taking pain medicines    Ask your pharmacist if you need to take the medicine with food or milk to avoid an upset stomach.    Don t break, crush, or cut in half any long-acting medicines. This could be harmful.    Don t take more medicine than directed. If your pain isn t relieved, call your healthcare provider.    Try to time your medicine so that you take it before starting an activity, such as dressing or sitting at the table for dinner.    Constipation is a common side effect with some pain medicines. Eating fruit, vegetables, and other foods high in fiber can help. Also drink plenty of fluids.    Don t drink alcohol while you are taking pain medicine. This can make you dizzy and slow your breathing. It can even be fatal.    Don t drive if you are taking opioid medicines.    Don t take opioids in combination with benzodiazepines. Doing so can cause serious health problems. These include extreme sleepiness, slowed breathing, and death. Let your healthcare provider know if you are taking benzodiazepines.     When to call your healthcare provider  Call your healthcare provider right away if:    Your pain is not relieved or if it gets worse    You can't take your pain medicines as prescribed    You have severe side effects like breathing problems, trouble waking up, dizziness, confusion, or severe constipation   Date Last Reviewed: 12/1/2017 2000-2017 [employer]. [address] [place] [address], [place] [address]. All rights reserved. This information is not intended as a substitute for professional medical care. Always follow your healthcare professional's  instructions.

## 2018-08-12 VITALS
RESPIRATION RATE: 16 BRPM | HEART RATE: 80 BPM | DIASTOLIC BLOOD PRESSURE: 97 MMHG | HEIGHT: 69 IN | OXYGEN SATURATION: 95 % | SYSTOLIC BLOOD PRESSURE: 141 MMHG | TEMPERATURE: 98 F | BODY MASS INDEX: 43.4 KG/M2 | WEIGHT: 293 LBS

## 2018-08-12 PROBLEM — R10.9 ABDOMINAL PAIN: Status: ACTIVE | Noted: 2018-08-12

## 2018-08-12 LAB
ALBUMIN SERPL-MCNC: 3.2 G/DL (ref 3.4–5)
ALP SERPL-CCNC: 102 U/L (ref 40–150)
ALT SERPL W P-5'-P-CCNC: 20 U/L (ref 0–50)
ANION GAP SERPL CALCULATED.3IONS-SCNC: 11 MMOL/L (ref 3–14)
AST SERPL W P-5'-P-CCNC: 13 U/L (ref 0–45)
BILIRUB DIRECT SERPL-MCNC: 0.1 MG/DL (ref 0–0.2)
BILIRUB SERPL-MCNC: 0.5 MG/DL (ref 0.2–1.3)
BUN SERPL-MCNC: 10 MG/DL (ref 7–30)
CALCIUM SERPL-MCNC: 8.3 MG/DL (ref 8.5–10.1)
CHLORIDE SERPL-SCNC: 105 MMOL/L (ref 94–109)
CO2 SERPL-SCNC: 24 MMOL/L (ref 20–32)
CREAT SERPL-MCNC: 0.57 MG/DL (ref 0.52–1.04)
ERYTHROCYTE [DISTWIDTH] IN BLOOD BY AUTOMATED COUNT: 13.5 % (ref 10–15)
GFR SERPL CREATININE-BSD FRML MDRD: >90 ML/MIN/1.7M2
GLUCOSE BLDC GLUCOMTR-MCNC: 129 MG/DL (ref 70–99)
GLUCOSE BLDC GLUCOMTR-MCNC: 130 MG/DL (ref 70–99)
GLUCOSE SERPL-MCNC: 137 MG/DL (ref 70–99)
HBA1C MFR BLD: 6.9 % (ref 0–5.6)
HCT VFR BLD AUTO: 40.3 % (ref 35–47)
HGB BLD-MCNC: 13.3 G/DL (ref 11.7–15.7)
MCH RBC QN AUTO: 30.4 PG (ref 26.5–33)
MCHC RBC AUTO-ENTMCNC: 33 G/DL (ref 31.5–36.5)
MCV RBC AUTO: 92 FL (ref 78–100)
PLATELET # BLD AUTO: 307 10E9/L (ref 150–450)
POTASSIUM SERPL-SCNC: 3.9 MMOL/L (ref 3.4–5.3)
PROT SERPL-MCNC: 7.1 G/DL (ref 6.8–8.8)
RBC # BLD AUTO: 4.37 10E12/L (ref 3.8–5.2)
SODIUM SERPL-SCNC: 140 MMOL/L (ref 133–144)
WBC # BLD AUTO: 12.4 10E9/L (ref 4–11)

## 2018-08-12 PROCEDURE — 80048 BASIC METABOLIC PNL TOTAL CA: CPT | Performed by: SURGERY

## 2018-08-12 PROCEDURE — 80076 HEPATIC FUNCTION PANEL: CPT | Performed by: SURGERY

## 2018-08-12 PROCEDURE — 36415 COLL VENOUS BLD VENIPUNCTURE: CPT | Performed by: SURGERY

## 2018-08-12 PROCEDURE — G0378 HOSPITAL OBSERVATION PER HR: HCPCS

## 2018-08-12 PROCEDURE — 93005 ELECTROCARDIOGRAM TRACING: CPT

## 2018-08-12 PROCEDURE — 25000132 ZZH RX MED GY IP 250 OP 250 PS 637: Performed by: SURGERY

## 2018-08-12 PROCEDURE — 85027 COMPLETE CBC AUTOMATED: CPT | Performed by: SURGERY

## 2018-08-12 PROCEDURE — 25000132 ZZH RX MED GY IP 250 OP 250 PS 637: Performed by: STUDENT IN AN ORGANIZED HEALTH CARE EDUCATION/TRAINING PROGRAM

## 2018-08-12 PROCEDURE — 83036 HEMOGLOBIN GLYCOSYLATED A1C: CPT | Performed by: STUDENT IN AN ORGANIZED HEALTH CARE EDUCATION/TRAINING PROGRAM

## 2018-08-12 PROCEDURE — 96376 TX/PRO/DX INJ SAME DRUG ADON: CPT | Performed by: EMERGENCY MEDICINE

## 2018-08-12 PROCEDURE — 93010 ELECTROCARDIOGRAM REPORT: CPT | Performed by: INTERNAL MEDICINE

## 2018-08-12 PROCEDURE — 25000128 H RX IP 250 OP 636: Performed by: EMERGENCY MEDICINE

## 2018-08-12 PROCEDURE — 00000146 ZZHCL STATISTIC GLUCOSE BY METER IP

## 2018-08-12 RX ORDER — MORPHINE SULFATE 4 MG/ML
4 INJECTION, SOLUTION INTRAMUSCULAR; INTRAVENOUS ONCE
Status: COMPLETED | OUTPATIENT
Start: 2018-08-12 | End: 2018-08-12

## 2018-08-12 RX ORDER — ONDANSETRON 4 MG/1
4 TABLET, ORALLY DISINTEGRATING ORAL EVERY 6 HOURS PRN
Status: DISCONTINUED | OUTPATIENT
Start: 2018-08-12 | End: 2018-08-12 | Stop reason: HOSPADM

## 2018-08-12 RX ORDER — AMOXICILLIN 250 MG
1 CAPSULE ORAL 2 TIMES DAILY
Status: DISCONTINUED | OUTPATIENT
Start: 2018-08-12 | End: 2018-08-12 | Stop reason: HOSPADM

## 2018-08-12 RX ORDER — OXYCODONE HYDROCHLORIDE 5 MG/1
5-10 TABLET ORAL EVERY 4 HOURS PRN
Qty: 20 TABLET | Refills: 0 | Status: SHIPPED | OUTPATIENT
Start: 2018-08-12 | End: 2019-01-17

## 2018-08-12 RX ORDER — PROCHLORPERAZINE MALEATE 5 MG
10 TABLET ORAL EVERY 6 HOURS PRN
Status: DISCONTINUED | OUTPATIENT
Start: 2018-08-12 | End: 2018-08-12 | Stop reason: HOSPADM

## 2018-08-12 RX ORDER — OXYCODONE HYDROCHLORIDE 5 MG/1
5-10 TABLET ORAL EVERY 4 HOURS PRN
Status: DISCONTINUED | OUTPATIENT
Start: 2018-08-12 | End: 2018-08-12 | Stop reason: HOSPADM

## 2018-08-12 RX ORDER — SUCRALFATE ORAL 1 G/10ML
1 SUSPENSION ORAL ONCE
Status: COMPLETED | OUTPATIENT
Start: 2018-08-12 | End: 2018-08-12

## 2018-08-12 RX ORDER — PROCHLORPERAZINE 25 MG
25 SUPPOSITORY, RECTAL RECTAL EVERY 12 HOURS PRN
Status: DISCONTINUED | OUTPATIENT
Start: 2018-08-12 | End: 2018-08-12 | Stop reason: HOSPADM

## 2018-08-12 RX ORDER — LISINOPRIL 10 MG/1
10 TABLET ORAL DAILY
Status: DISCONTINUED | OUTPATIENT
Start: 2018-08-12 | End: 2018-08-12 | Stop reason: HOSPADM

## 2018-08-12 RX ORDER — ACETAMINOPHEN 325 MG/1
650 TABLET ORAL EVERY 4 HOURS PRN
Status: DISCONTINUED | OUTPATIENT
Start: 2018-08-12 | End: 2018-08-12 | Stop reason: HOSPADM

## 2018-08-12 RX ORDER — AMOXICILLIN 250 MG
2 CAPSULE ORAL 2 TIMES DAILY
Status: DISCONTINUED | OUTPATIENT
Start: 2018-08-12 | End: 2018-08-12 | Stop reason: HOSPADM

## 2018-08-12 RX ORDER — PANTOPRAZOLE SODIUM 40 MG/1
40 TABLET, DELAYED RELEASE ORAL
Status: DISCONTINUED | OUTPATIENT
Start: 2018-08-12 | End: 2018-08-12 | Stop reason: HOSPADM

## 2018-08-12 RX ORDER — ONDANSETRON 2 MG/ML
4 INJECTION INTRAMUSCULAR; INTRAVENOUS ONCE
Status: COMPLETED | OUTPATIENT
Start: 2018-08-12 | End: 2018-08-12

## 2018-08-12 RX ORDER — NALOXONE HYDROCHLORIDE 0.4 MG/ML
.1-.4 INJECTION, SOLUTION INTRAMUSCULAR; INTRAVENOUS; SUBCUTANEOUS
Status: DISCONTINUED | OUTPATIENT
Start: 2018-08-12 | End: 2018-08-12 | Stop reason: HOSPADM

## 2018-08-12 RX ORDER — METHOCARBAMOL 500 MG/1
500 TABLET, FILM COATED ORAL 4 TIMES DAILY
Status: DISCONTINUED | OUTPATIENT
Start: 2018-08-12 | End: 2018-08-12 | Stop reason: HOSPADM

## 2018-08-12 RX ORDER — ACETAMINOPHEN 650 MG/1
650 SUPPOSITORY RECTAL EVERY 4 HOURS PRN
Status: DISCONTINUED | OUTPATIENT
Start: 2018-08-12 | End: 2018-08-12 | Stop reason: HOSPADM

## 2018-08-12 RX ORDER — ONDANSETRON 2 MG/ML
4 INJECTION INTRAMUSCULAR; INTRAVENOUS EVERY 6 HOURS PRN
Status: DISCONTINUED | OUTPATIENT
Start: 2018-08-12 | End: 2018-08-12 | Stop reason: HOSPADM

## 2018-08-12 RX ORDER — OXYCODONE HYDROCHLORIDE 5 MG/1
5-10 TABLET ORAL EVERY 6 HOURS PRN
Status: DISCONTINUED | OUTPATIENT
Start: 2018-08-12 | End: 2018-08-12

## 2018-08-12 RX ORDER — NICOTINE POLACRILEX 4 MG
15-30 LOZENGE BUCCAL
Status: DISCONTINUED | OUTPATIENT
Start: 2018-08-12 | End: 2018-08-12 | Stop reason: HOSPADM

## 2018-08-12 RX ORDER — DEXTROSE MONOHYDRATE 25 G/50ML
25-50 INJECTION, SOLUTION INTRAVENOUS
Status: DISCONTINUED | OUTPATIENT
Start: 2018-08-12 | End: 2018-08-12 | Stop reason: HOSPADM

## 2018-08-12 RX ORDER — PANTOPRAZOLE SODIUM 40 MG/1
40 TABLET, DELAYED RELEASE ORAL
Qty: 30 TABLET | Refills: 1 | Status: SHIPPED | OUTPATIENT
Start: 2018-08-13 | End: 2019-01-17

## 2018-08-12 RX ORDER — METHOCARBAMOL 500 MG/1
500 TABLET, FILM COATED ORAL 4 TIMES DAILY
Qty: 240 TABLET | Refills: 0 | Status: SHIPPED | OUTPATIENT
Start: 2018-08-12 | End: 2019-01-17

## 2018-08-12 RX ADMIN — SODIUM CHLORIDE 1000 ML: 9 INJECTION, SOLUTION INTRAVENOUS at 02:28

## 2018-08-12 RX ADMIN — ACETAMINOPHEN 650 MG: 325 TABLET, FILM COATED ORAL at 08:17

## 2018-08-12 RX ADMIN — MORPHINE SULFATE 4 MG: 4 INJECTION INTRAVENOUS at 02:28

## 2018-08-12 RX ADMIN — LISINOPRIL 10 MG: 10 TABLET ORAL at 12:20

## 2018-08-12 RX ADMIN — OXYCODONE HYDROCHLORIDE 5 MG: 5 TABLET ORAL at 07:05

## 2018-08-12 RX ADMIN — OXYCODONE HYDROCHLORIDE 5 MG: 5 TABLET ORAL at 10:56

## 2018-08-12 RX ADMIN — PANTOPRAZOLE SODIUM 40 MG: 40 TABLET, DELAYED RELEASE ORAL at 08:17

## 2018-08-12 RX ADMIN — METHOCARBAMOL 500 MG: 500 TABLET, FILM COATED ORAL at 11:45

## 2018-08-12 RX ADMIN — OXYCODONE HYDROCHLORIDE 5 MG: 5 TABLET ORAL at 03:39

## 2018-08-12 RX ADMIN — SUCRALFATE 1 G: 1 SUSPENSION ORAL at 08:17

## 2018-08-12 RX ADMIN — ONDANSETRON 4 MG: 2 INJECTION, SOLUTION INTRAMUSCULAR; INTRAVENOUS at 02:28

## 2018-08-12 RX ADMIN — OXYCODONE HYDROCHLORIDE 5 MG: 5 TABLET ORAL at 08:17

## 2018-08-12 NOTE — PROGRESS NOTES
"General Surgery Progress Note    Subjective: Slept well overnight. When she awakened this morning experienced severe R flank pain (similar to her post-surgical pain). Pain is worse with deep breathing and moving.     Objective:   /90  Pulse 98  Temp 98.3  F (36.8  C) (Oral)  Resp 18  Ht 1.753 m (5' 9\")  Wt (!) 160 kg (352 lb 12.8 oz)  LMP 07/30/2018  SpO2 97%  BMI 52.1 kg/m2    PE:  Gen: Awake, alert, NAD. Appears frustrated during interview.   CV: RRR, hypertensive  Resp: non-labored on room air  Abd: Obese, soft, nondistended, TTP in R flank area. Scattered ecchymosis near previous incision sites.  Ext: warm and well perfused  Incision: c/d/i    Recent labs reviewed. (8/11):  -WBC 14.7  -Glu 206    UA (8/11):  -Glu 70  -Ketones 10  -Albumin 10  -Moderate LE  -WBC: 6    Recent imaging reviewed.    CT (8/11):    Impression:  1. Mucosal hyperenhancement and wall thickening of the second portion  of the duodenum suggestive of infectious/inflammatory duodenitis.  2. Postoperative changes of cholecystectomy with mesenteric soft  tissue stranding and scattered pneumoperitoneum, likely within normal  limits postoperatively.    A/P: Mary Dixon is a 38 year old female, who is POD # 11  following laparoscopic cholecystectomy for chronic cholecystitis who is admitted for RUQ/flank pain, leukocytosis, and CT findings of duodenitis, c/f ulcer disease.     NEURO Increase frequency of oxycodone to 5-10 q4h, prn. Tylenol 650 q4h prn.    CV Hypertensive. Restart home Lisinopril 10 mg every day. EKG demonstrated NSR.   PULM Aggressive pulmonary toilet and I/S.   FEN/GI ADAT. MIVF @ 125/hr. LFTs normal. No evidence of fluid collection or biliary tree dilation. Will start emperic treatment for duodenal ulcer. Sucralfate x1, Protonix. If no improvement will recommend EGD as an outpatient.    UA + for moderate LE and 6 WBC. No dysuria or other urinary Sx. Will f/u culture results    HEME No issues.    ID Afebrile, " WBC 14.7. Recheck labs today.    ENDO Glu 206 on admission. UA + for ketones and Glu. Patient likely has undiagnosed Diabetes. Will start sliding scale insulin and check HbA1c.   ACTIVITY Encourage ambulation   PPx SCDs, Protonix   DISPO Home today if pain well controlled.        Seen and discussed with chief resident, Dr. Rose, who will discuss with staff: Dr. Diaz.    Maliha Gonzalez MD  PGY-1 Surgery  Pager [phone]

## 2018-08-12 NOTE — ED NOTES
[facility], [place]   ED Nurse to Floor Handoff     Mary Dixon is a 38 year old female who speaks English and lives alone,  in a home  They arrived in the ED by car from home    ED Chief Complaint: Abdominal Pain    ED Dx;   Final diagnoses:   Abdominal pain, right upper quadrant   Hypertension, unspecified type   Duodenitis   Leukocytosis, unspecified type         Needed?: No    Allergies: No Known Allergies.  Past Medical Hx:   Past Medical History:   Diagnosis Date     Benign intracranial hypertension      Mononucleosis      Ovarian cancer (H)     borderline ovarian cancer     Pneumonia      Post-menopausal bleeding       Baseline Mental status: WDL  Current Mental Status changes: at basesline    Infection present or suspected this encounter: yes enteric  Sepsis suspected: No  Isolation type: No active isolations     Activity level - Baseline/Home:  Independent  Activity Level - Current:   Independent    Bariatric equipment needed?: No    In the ED these meds were given:   Medications   0.9% sodium chloride BOLUS (0 mLs Intravenous Stopped 8/12/18 0000)     Followed by   sodium chloride 0.9% infusion (1,000 mLs Intravenous New Bag 8/12/18 0228)   morphine (PF) injection 4 mg (4 mg Intravenous Given 8/12/18 0228)   morphine (PF) injection 4 mg (4 mg Intravenous Given 8/11/18 2300)   ondansetron (ZOFRAN) injection 4 mg (4 mg Intravenous Given 8/11/18 2258)   iopamidol (ISOVUE-370) solution 135 mL (135 mLs Intravenous Given 8/11/18 2359)   sodium chloride (PF) 0.9% PF flush 84 mL (84 mLs Intravenous Given 8/11/18 2359)   ondansetron (ZOFRAN) injection 4 mg (4 mg Intravenous Given 8/12/18 0228)       Drips running?  Yes    Home pump  No    Current LDAs  Peripheral IV 08/01/18 (Active)   Number of days:11       Peripheral IV 08/11/18 Right Lower forearm (Active)   Site Assessment WDL 8/11/2018 10:42 PM   Line Status Saline locked 8/11/2018 10:42 PM   Phlebitis Scale 0-->no  "symptoms 8/11/2018 10:42 PM   Infiltration Scale 0 8/11/2018 10:42 PM   Infiltration Site Treatment Method  None 8/11/2018 10:42 PM   Extravasation? No 8/11/2018 10:42 PM   Dressing Intervention Dressing reinforced 8/11/2018 10:42 PM   Number of days:1       Incision/Surgical Site 08/01/18 Bilateral Abdomen (Active)   Number of days:11       Labs results:   Labs Ordered and Resulted from Time of ED Arrival Up to the Time of Departure from the ED   CBC WITH PLATELETS DIFFERENTIAL - Abnormal; Notable for the following:        Result Value    WBC 14.7 (*)     Absolute Neutrophil 11.4 (*)     All other components within normal limits   COMPREHENSIVE METABOLIC PANEL - Abnormal; Notable for the following:     Glucose 206 (*)     All other components within normal limits   UA MACROSCOPIC WITH REFLEX TO MICRO AND CULTURE - Abnormal; Notable for the following:     Glucose Urine 70 (*)     Ketones Urine 10 (*)     Protein Albumin Urine 10 (*)     Leukocyte Esterase Urine Moderate (*)     WBC Urine 6 (*)     Squamous Epithelial /HPF Urine 3 (*)     Mucous Urine Present (*)     All other components within normal limits   LIPASE   MAGNESIUM   HCG QUALITATIVE   PERIPHERAL IV CATHETER   URINE CULTURE AEROBIC BACTERIAL       Imaging Studies:   Recent Results (from the past 24 hour(s))   Chest  XR, 1 view portable    Narrative    No acute airspace disease.   CT Abdomen Pelvis w Contrast    Narrative    1. Mucosal hyperenhancement and wall thickening of the second portion  of the duodenum suggestive of infectious/inflammatory duodenitis.  2. Postoperative changes of cholecystectomy with mesenteric soft  tissue stranding and scattered pneumoperitoneum, likely within normal  limits postoperatively.       Recent vital signs:   /68  Pulse 94  Temp 98.6  F (37  C) (Oral)  Resp 17  Ht 1.753 m (5' 9\")  Wt (!) 159.7 kg (352 lb)  LMP 07/30/2018  SpO2 97%  BMI 51.98 kg/m2    Cardiac Rhythm: Normal Sinus  Pt needs tele? " No  Skin/wound Issues: None    Code Status: Full Code    Pain control: fair    Nausea control: good    Abnormal labs/tests/findings requiring intervention: See Epic    Family present during ED course? Yes   Family Comments/Social Situation comments: Twin Sister    Tasks needing completion: None    Hilario Barton RN  [facility]-- [phone] [place] [facility]  [phone] [facility]

## 2018-08-12 NOTE — PLAN OF CARE
Problem: Patient Care Overview  Goal: Individualization & Mutuality  Outcome: Improving  Observation goals:  -vital signs normal or at patient baseline   -tolerating oral intake to maintain hydration   -adequate pain control on oral analgesics   -returns to baseline functional status   1) Blood pressure went from 157/113 p 90 to 140/93 p 88 after pain medications. Pt declined Lisinopril stating that blood pressure has been in control for the last 2 months without it.   2) Pt is starting to drink and eat now.    3) Pain medications staggered and Tylenol given. Good control. Pt felt able to walk to the cafeteria and the 5 th floor. Pain with coughing reported.   4) WBC has decreased. EKG is normal sinus rhythm. A1C decreased from December of last year. Pt verbalized knowing that she is borderline diabetic. Pt is feeling a bit better in general.

## 2018-08-12 NOTE — PLAN OF CARE
"Problem: Patient Care Overview  Goal: Individualization & Mutuality  Outcome: Improving  BP (!) 141/97  Pulse 80  Temp 98  F (36.7  C) (Oral)  Resp 16  Ht 1.753 m (5' 9\")  Wt (!) 160 kg (352 lb 12.8 oz)  LMP 07/30/2018  SpO2 95%  BMI 52.1 kg/m2  Pt took Lisinopril 10 mg po prior to leaving.  No recheck able to be done.   Afebrile. Pain has decreased with Oxycodone 5 mg given at 1100 and Robaxin 500 mg as charted.   Ate one meal without pain. Denies nausea.   Good UOP. 1000 ml water in.   No bowel movement.   Right PIV removed with cannula intact. Discharge instructions reviewed in detail with patient. Time left for questions.   Pt left ambulatory in stable condition.        "

## 2018-08-12 NOTE — PLAN OF CARE
Problem: Patient Care Overview  Goal: Plan of Care/Patient Progress Review  Outcome: No Change  Admitted from the ED to  at approximately 0300. Sister at bedside. Hypertensive, 160/90. oVSS on RA. Pain medication was given prior to coming to  but requested PRN oxycodone 5mg. Denies nausea. Clear liquid diet ordered. PIV infusing NS at 125mL/hr. Voiding on demand, has not voided since coming to . Reports having a BM on 8/11/2018. Four lap sites noted to abdomen from lap maco procedure on 8/1/2018, dermabonded. SRIRAM, no drainage noted. Able to ambulate independently. Educated on use of call light. Verbalizes understanding.

## 2018-08-12 NOTE — DISCHARGE SUMMARY
[facility] Discharge Summary    Mary Dixon MRN# [id number]   Age: 38 year old YOB: 1980     Date of Admission:  8/11/2018  Date of Discharge::  8/12/2018  Admitting Physician:  Violeta Diaz MD  Discharge Physician:  Maliha Gonzalez MD            Admission Diagnoses:   Abdominal pain, right upper quadrant [R10.11]  Duodenitis [K29.80]  Leukocytosis, unspecified type [D72.829]  Hypertension, unspecified type [I10]          Discharge Diagnosis:   Same.          Procedures:   None.           Medications Prior to Admission:     Prescriptions Prior to Admission   Medication Sig Dispense Refill Last Dose     Acetaminophen (TYLENOL PO) Take 500 mg by mouth every 8 hours as needed for mild pain or fever   8/11/2018 at 2000     HYDROcodone-acetaminophen (NORCO) 5-325 MG per tablet Take 1-2 tablets by mouth every 4 hours as needed for severe pain 10 tablet 0 8/11/2018 at 1900     lisinopril (PRINIVIL/ZESTRIL) 10 MG tablet Take 1 tablet (10 mg) by mouth daily 90 tablet 3 Past Week at Unknown time     IBUPROFEN PO Take 400 mg by mouth every 8 hours as needed for moderate pain   Taking     [DISCONTINUED] ranitidine (ZANTAC) 150 MG tablet Take 1 tablet (150 mg) by mouth 2 times daily 180 tablet 3 Taking             Discharge Medications:     Current Discharge Medication List      START taking these medications    Details   methocarbamol (ROBAXIN) 500 MG tablet Take 1 tablet (500 mg) by mouth 4 times daily  Qty: 240 tablet, Refills: 0    Associated Diagnoses: Abdominal pain, right upper quadrant      oxyCODONE IR (ROXICODONE) 5 MG tablet Take 1-2 tablets (5-10 mg) by mouth every 4 hours as needed for other (pain control or improvement in physical function.)  Qty: 20 tablet, Refills: 0    Associated Diagnoses: Myofascial pain; Abdominal pain, right upper quadrant      pantoprazole (PROTONIX) 40 MG EC tablet Take 1 tablet (40 mg) by mouth every morning (before breakfast)  Qty: 30 tablet, Refills:  "1    Associated Diagnoses: Abdominal pain, right upper quadrant         CONTINUE these medications which have NOT CHANGED    Details   Acetaminophen (TYLENOL PO) Take 500 mg by mouth every 8 hours as needed for mild pain or fever      HYDROcodone-acetaminophen (NORCO) 5-325 MG per tablet Take 1-2 tablets by mouth every 4 hours as needed for severe pain  Qty: 10 tablet, Refills: 0    Associated Diagnoses: Post-op pain      lisinopril (PRINIVIL/ZESTRIL) 10 MG tablet Take 1 tablet (10 mg) by mouth daily  Qty: 90 tablet, Refills: 3    Associated Diagnoses: Essential hypertension      IBUPROFEN PO Take 400 mg by mouth every 8 hours as needed for moderate pain         STOP taking these medications       ranitidine (ZANTAC) 150 MG tablet Comments:   Reason for Stopping:                     Brief History of Illness:   Mary Dixon is a 38 year-old female with a history of chronic cholecystitis, intracranial HTN, obesity, myofascial pain syndrome, remote ovarial Ca, who is now POD#11 from a laparoscopic cholecystectomy at [facility]. She presented to the ED with 4 days of intense RUQ pain after \"reaching to grab phone off the bed.\" Additionally, patient had an episode of \"tunnel vision,\" diaphoresis, tachycardia/tachypnea last night, prompting her to be evaluated in the ED.           Hospital Course:   WBC on admission was 14.7 with stable vital signs. CT scan demonstrated mild evidence of duodenitis but no intraabdominal fluid collections. EKG demonstrated no abnormalities. She was admitted to observation overnight. Her pain was controlled with Oxycodone, Tylenol, and Robaxin. She was given 1 dose of sucralfate and Protonix as there was concern for duodenitis/duodenal ulcer disease.     The next morning her WBC was 12.4. Hepatic panel was normal. Her UA showed mild leukocyte esterase with 6 WBC. Patient Asx. Additionally her glucose was high and HbA1c was 6.9. Urine was + for Glu and Ketones.    At the time of " discharge she was ambulating, tolerating a regular diet, and her pain ws controlled on oral medications.          Discharge Instructions and Follow-Up:   Discharge Instructions:    Activity  Resume activity as tolerated. Please continue ambulating at least 3 times per day.    Incisions  Your incisions are healing well with no evidence of infection. Please continue routine incisional care.     Medications  - Please start taking Protonix, 40 mg every morning   - You will be discharged with a limited amount of narcotic pain medication  - Do not take any additional Tylenol (acetaminophen) while using a narcotic pain medication which includes acetaminophen  - Do not take more than 4,000mg of acetaminophen in any 24 hour period, as this can cause liver damage  - Take stool softeners such as Senna or Miralax while you are using narcotics, but stop if you develop diarrhea  - Wean yourself off of narcotic pain medications    Follow-Up:  -Please follow up with your primary care provider in 1-2 weeks regarding elevated HbA1c.  -Please follow up with GI as an outpatient in 1-2 weeks for an EGD to further evaluate duodenitis seen on CT scan.     - Call your primary care provider to touch base regarding your recent admission.     - Call or return sooner than your regularly scheduled visit if you develop any of the following: fever >101.5, uncontrolled pain, uncontrolled nausea or vomiting, as well as increased redness, swelling, or drainage from your wound.   -  A nurse from the General Surgery Clinic will contact you 24 hours, or next business day, after your discharge from the hospital.  If you have questions or to schedule a follow up appointment please call the General Surgery Clinic at [phone].  Call [phone] and ask to speak with the Surgery resident on call if you are having troubles in the evenings, at night, or on the weekend.  -  If you are receiving home care please inform your home care nurse of our contact  number.         Discharge Disposition:   Discharged to home      Attestation:  I have reviewed today's vital signs, notes, medications, labs and imaging.  Amount of time performed on this discharge summary: 30 minutes.    Maliha Gonzalez MD

## 2018-08-12 NOTE — ED PROVIDER NOTES
History     Chief Complaint   Patient presents with     Abdominal Pain     HPI  Mary Dixon is a 38 year old female s/p appendectomy (3/6/08) and recent laparoscopic cholecystectomy with cholangiograms on 8/1/18 (uncomplicated) with history of benign intracranial HTN and ovarian cancer who presents for evaluation of sharp, right upper quadrant abdominal pain that started yesterday. She also endorses episodes of tunnel vision, tinnitus, tachycardia, near-syncope, darker urine, and nausea. Patient denies numbness or tingling. She reports her wound has been healing okay.    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        Family History   Problem Relation Age of Onset     Hypertension Father      Diabetes Maternal Grandmother      Alzheimer Disease Maternal Grandmother      Breast Cancer Maternal Grandmother      HEART DISEASE Maternal Grandfather      Hypertension Maternal Grandfather      Coronary Artery Disease Maternal Grandfather 55     bypass       Social History   Substance Use Topics     Smoking status: Current Every Day Smoker     Packs/day: 0.50     Years: 2.00     Types: Cigarettes     Smokeless tobacco: Never Used     Alcohol use No       Current Facility-Administered Medications   Medication     morphine (PF) injection 4 mg     ondansetron (ZOFRAN) injection 4 mg     sodium chloride 0.9% infusion     Current Outpatient Prescriptions   Medication     Acetaminophen  "(TYLENOL PO)     HYDROcodone-acetaminophen (NORCO) 5-325 MG per tablet     lisinopril (PRINIVIL/ZESTRIL) 10 MG tablet     IBUPROFEN PO     ranitidine (ZANTAC) 150 MG tablet     Facility-Administered Medications Ordered in Other Encounters   Medication     indocyanine green (IC-GREEN) injection      No Known Allergies      I have reviewed the Medications, Allergies, Past Medical and Surgical History, and Social History in the Epic system.    Review of Systems     ROS: 14 point ROS neg other than the symptoms noted above in the HPI.    Physical Exam   BP: (!) 153/91  Pulse: 111  Temp: 98.6  F (37  C)  Resp: 18  Height: 175.3 cm (5' 9\")  Weight: (!) 159.7 kg (352 lb)  SpO2: 96 %      Physical Exam   Constitutional: No distress.   HENT:   Head: Atraumatic.   Mouth/Throat: Oropharynx is clear and moist. No oropharyngeal exudate.   Eyes: Pupils are equal, round, and reactive to light. No scleral icterus.   Cardiovascular: Normal heart sounds and intact distal pulses.    Pulmonary/Chest: Breath sounds normal. No respiratory distress.   Abdominal: Soft. Bowel sounds are normal. There is tenderness in the right upper quadrant. There is no rebound and no guarding.   Benign  Wounds are cleaned, dry, and intact   Musculoskeletal: She exhibits no edema or tenderness.   Skin: Skin is warm. No rash noted. She is not diaphoretic.       ED Course     ED Course   Addendum 00 30 hours patient is doing well her pain has decreased that workup did show mild leukocytosis at 14.7 with a CT scan to show some duodenitis as well as some postop changes.  At this point to ensure the patient safety I plan to call general surgery resident to evaluate this patient in ensure a safe disposition    Addendum 0200 hrs. this is beginning to get some more pain is asking for pain medication I just got a call back from surgery and they will admit this patient to observation to Dr. Diaz.  She did not agrees with admission      Procedures       10:19 PM  " The patient was seen and examined by Dr. Guerra in Room 20.          Critical Care time:  none             Labs Ordered and Resulted from Time of ED Arrival Up to the Time of Departure from the ED   CBC WITH PLATELETS DIFFERENTIAL - Abnormal; Notable for the following:        Result Value    WBC 14.7 (*)     Absolute Neutrophil 11.4 (*)     All other components within normal limits   COMPREHENSIVE METABOLIC PANEL - Abnormal; Notable for the following:     Glucose 206 (*)     All other components within normal limits   UA MACROSCOPIC WITH REFLEX TO MICRO AND CULTURE - Abnormal; Notable for the following:     Glucose Urine 70 (*)     Ketones Urine 10 (*)     Protein Albumin Urine 10 (*)     Leukocyte Esterase Urine Moderate (*)     WBC Urine 6 (*)     Squamous Epithelial /HPF Urine 3 (*)     Mucous Urine Present (*)     All other components within normal limits   LIPASE   MAGNESIUM   HCG QUALITATIVE   PERIPHERAL IV CATHETER   URINE CULTURE AEROBIC BACTERIAL            Assessments & Plan (with Medical Decision Making)   This is a nice 38-year-old female with a past medical history of recent cholecystectomy done only 10 days ago by Dr. Gilbert Garcia.  Patient had laparoscopic cholecystectomy and presents now for evaluation of abdominal pain.  Sickle exam is reassuring patient was noted to have a leukocytosis of 14.7 otherwise normal labs CT scan revealed some duodenitis as well as changes consistent with recent surgery.  Very consult has been called patient will be disposition once a second surgery surgery consult is completed.    I have reviewed the nursing notes.    I have reviewed the findings, diagnosis, plan and need for follow up with the patient.    New Prescriptions    No medications on file       Final diagnoses:   Abdominal pain, right upper quadrant   Hypertension, unspecified type   Duodenitis   Leukocytosis, unspecified type   I, Donovan Merritt, am serving as a trained medical scribe to document services  personally performed by Tobias Guerra MD, based on the provider's statements to me.   I, Tobias Guerra MD, was physically present and have reviewed and verified the accuracy of this note documented by Donovan Merritt.      8/11/2018   [facility], [place], EMERGENCY DEPARTMENT     Tobias Guerra MD  08/12/18 0203

## 2018-08-12 NOTE — H&P
"  GENERAL SURGERY H&P/CONSULT  August 12, 2018      ASSESSMENT: Mary Dixon is a 38 year old female POD # 10 s/p laparoscopic cholecystectomy who presents with RUQ pain and leukocytosis. No evidence of biloma or an abscess. No intrahepatic biliary dilatation on CT. Only finding is duodenitis, which may indicate PUD.     PLAN:    - Admit to obs  - Trend labs  - CLD for now  - Hold off on abx given no obvious infectious etiology.     Patient's findings and plan discussed with staff, Dr. Diaz.    ------------------------------------------------------------------------------------------    HISTORY PRESENTING ILLNESS: Mary Dixon is a 38 year old female POD # 10 s/p laparoscopic cholecystectomy on August 1 at [facility].     On POD#5 the patient had worsened abdominal pain but the pain improved soon after (she did not use Norco for the following 4 days). However, yesterday she noticed new onset sharp, \"stabbing\" pain in her RUQ soon after she got down on her hands and knees and reached for her phone. The pain is exacerbated by taking a deep breath or by sudden movements. Her urine has been darker than normal recently but she has not noticed any change in the color of her stool. She has been tolerating a regular diet and has been having regular BMs nearly every day. She is unsure as to whether she has had fevers recently. One day recently she had a \"fit\" where she had abdominal pain, sweating, nausea, and tunnel vision for about 20 minutes which was followed by a decrease in her abdominal pain.    Op note was reviewed and per report CVS was achieved and ICG was used to identify the cystic duct, Path report shows chronic cholecystitis.     REVIEW OF SYSTEMS: A 10 point ROS was negative except as noted above. No nausea, vomiting. No urinary difficulties.    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     Social History Main Topics     Smoking status: Current Every Day Smoker     Packs/day: 0.50     Years: 2.00     Types: Cigarettes     Smokeless tobacco: Never Used     Alcohol use No     Drug use: No     Sexual activity: No     Other Topics Concern     Parent/Sibling W/ Cabg, Mi Or Angioplasty Before 65f 55m? No     Social History Narrative       FAMILY HISTORY:   Family History   Problem Relation Age of Onset     Hypertension Father      Diabetes Maternal Grandmother      Alzheimer Disease Maternal Grandmother      Breast Cancer Maternal Grandmother      HEART DISEASE Maternal Grandfather      Hypertension Maternal Grandfather      Coronary Artery Disease Maternal Grandfather 55     bypass       ALLERGIES:    No Known Allergies    MEDICATIONS:    Current Facility-Administered Medications on File Prior to Encounter:  indocyanine green (IC-GREEN) injection     Current Outpatient Prescriptions on File Prior to Encounter:  Acetaminophen (TYLENOL PO) Take 500 mg by mouth every 8 hours as needed for mild pain or fever   HYDROcodone-acetaminophen (NORCO) 5-325 MG per tablet Take 1-2 tablets by mouth every 4 hours as needed for severe pain   lisinopril (PRINIVIL/ZESTRIL) 10 MG tablet Take 1 tablet (10 mg) by mouth daily   IBUPROFEN PO Take  400 mg by mouth every 8 hours as needed for moderate pain   ranitidine (ZANTAC) 150 MG tablet Take 1 tablet (150 mg) by mouth 2 times daily       PHYSICAL EXAMINATION:  Temp:  [98.6  F (37  C)] 98.6  F (37  C)  Pulse:  [111] 111  Resp:  [18] 18  BP: (128-153)/(69-91) 128/69  SpO2:  [94 %-96 %] 95 %  General: Alert, well-appearing in no acute distress.  HEENT: Normocephalic, atraumatic. Patent nares.  Chest wall: Symmetric thorax.  Respiratory: Non-labored breathing.  Cardiovascular: Regular rate and rhythm.   Gastrointestinal: Moderate tenderness in RUQ. Port sites healing well with appropriate erythema, no induration. Abdomen soft, obese, non-distended.   Extremities: Moving all four extremities.  Skin: 2-3 small papules noted on abdomen (one suprapubic, one RLQ). As noted above.    LABS: Reviewed.   Complete Blood Count     Recent Labs  Lab 08/11/18 2247   WBC 14.7*   HGB 13.9        Basic Metabolic Panel    Recent Labs  Lab 08/11/18 2247      POTASSIUM 3.8   CHLORIDE 104   CO2 25   BUN 15   CR 0.70   *     Liver Function Tests    Recent Labs  Lab 08/11/18  2247   AST 11   ALT 23   ALKPHOS 120   BILITOTAL 0.4   ALBUMIN 3.5     Pancreatic Enzymes    Recent Labs  Lab 08/11/18  2247   LIPASE 113       IMAGING:  Results for orders placed or performed during the hospital encounter of 08/11/18   CT Abdomen Pelvis w Contrast    Narrative    1. Mucosal hyperenhancement and wall thickening of the second portion  of the duodenum suggestive of infectious/inflammatory duodenitis.  2. Postoperative changes of cholecystectomy with mesenteric soft  tissue stranding and scattered pneumoperitoneum, likely within normal  limits postoperatively.   Chest  XR, 1 view portable    Narrative    No acute airspace disease.         CO-MORBIDITIES:   Abdominal pain, right upper quadrant  Hypertension, unspecified type      Declan Huang MD  General Surgery, PGY-3  [phone]

## 2018-08-12 NOTE — DISCHARGE INSTRUCTIONS
Discharge Instructions:    Activity  Resume activity as tolerated. Please continue ambulating at least 3 times per day.    Incisions  Your incisions are healing well with no evidence of infection. Please continue routine incisional care.     Medications  - Please start taking Protonix, 40 mg every morning   - You will be discharged with a limited amount of narcotic pain medication  - Do not take any additional Tylenol (acetaminophen) while using a narcotic pain medication which includes acetaminophen  - Do not take more than 4,000mg of acetaminophen in any 24 hour period, as this can cause liver damage  - Take stool softeners such as Senna or Miralax while you are using narcotics, but stop if you develop diarrhea  - Wean yourself off of narcotic pain medications    Follow-Up:  - Call your primary care provider to touch base regarding your recent admission.     - Call or return sooner than your regularly scheduled visit if you develop any of the following: fever >101.5, uncontrolled pain, uncontrolled nausea or vomiting, as well as increased redness, swelling, or drainage from your wound.   -  A nurse from the General Surgery Clinic will contact you 24 hours, or next business day, after your discharge from the hospital.  If you have questions or to schedule a follow up appointment please call the General Surgery Clinic at [phone].  Call [phone] and ask to speak with the Surgery resident on call if you are having troubles in the evenings, at night, or on the weekend.  -  If you are receiving home care please inform your home care nurse of our contact number.

## 2018-08-12 NOTE — ED TRIAGE NOTES
Pt presents to ED with c/o abd pain following gallbladder removal. Gallbladder was removed on 8/1. Pain improved after POD 5. Yesterday morning pt crouched down and since then has had severe pain that comes and gos. Today had episode of tachypnea, tachycardia, tunnel vision, and diaphoresis with episode of increased pain. Since then pain has somewhat improved. Pt has been taking Norco, tylenol, and ibuprofen for pain.

## 2018-08-13 ENCOUNTER — CARE COORDINATION (OUTPATIENT)
Dept: SURGERY | Facility: CLINIC | Age: 38
End: 2018-08-13

## 2018-08-13 LAB
BACTERIA SPEC CULT: NORMAL
INTERPRETATION ECG - MUSE: NORMAL
Lab: NORMAL
SPECIMEN SOURCE: NORMAL

## 2018-08-14 ENCOUNTER — TELEPHONE (OUTPATIENT)
Dept: SURGERY | Facility: CLINIC | Age: 38
End: 2018-08-14

## 2018-08-14 ENCOUNTER — OFFICE VISIT (OUTPATIENT)
Dept: FAMILY MEDICINE | Facility: CLINIC | Age: 38
End: 2018-08-14
Payer: COMMERCIAL

## 2018-08-14 ENCOUNTER — TELEPHONE (OUTPATIENT)
Dept: GASTROENTEROLOGY | Facility: CLINIC | Age: 38
End: 2018-08-14

## 2018-08-14 VITALS
BODY MASS INDEX: 43.4 KG/M2 | TEMPERATURE: 98.3 F | WEIGHT: 293 LBS | SYSTOLIC BLOOD PRESSURE: 140 MMHG | DIASTOLIC BLOOD PRESSURE: 91 MMHG | HEIGHT: 69 IN | HEART RATE: 86 BPM

## 2018-08-14 DIAGNOSIS — G93.2 BENIGN INTRACRANIAL HYPERTENSION: ICD-10-CM

## 2018-08-14 DIAGNOSIS — Z90.49 S/P CHOLECYSTECTOMY: ICD-10-CM

## 2018-08-14 DIAGNOSIS — M79.18 MYOFASCIAL PAIN: Primary | ICD-10-CM

## 2018-08-14 DIAGNOSIS — E66.01 MORBID OBESITY (H): ICD-10-CM

## 2018-08-14 DIAGNOSIS — E11.9 TYPE 2 DIABETES MELLITUS WITHOUT COMPLICATION, WITHOUT LONG-TERM CURRENT USE OF INSULIN (H): ICD-10-CM

## 2018-08-14 DIAGNOSIS — R10.9 ABDOMINAL PAIN: Primary | ICD-10-CM

## 2018-08-14 LAB
BASOPHILS # BLD AUTO: 0.1 10E9/L (ref 0–0.2)
BASOPHILS NFR BLD AUTO: 0.5 %
CRP SERPL-MCNC: 64.2 MG/L (ref 0–8)
DIFFERENTIAL METHOD BLD: NORMAL
EOSINOPHIL # BLD AUTO: 0.4 10E9/L (ref 0–0.7)
EOSINOPHIL NFR BLD AUTO: 4.5 %
ERYTHROCYTE [DISTWIDTH] IN BLOOD BY AUTOMATED COUNT: 13.1 % (ref 10–15)
ERYTHROCYTE [SEDIMENTATION RATE] IN BLOOD BY WESTERGREN METHOD: 19 MM/H (ref 0–20)
HCT VFR BLD AUTO: 44.8 % (ref 35–47)
HGB BLD-MCNC: 15 G/DL (ref 11.7–15.7)
LYMPHOCYTES # BLD AUTO: 2.1 10E9/L (ref 0.8–5.3)
LYMPHOCYTES NFR BLD AUTO: 21.7 %
MCH RBC QN AUTO: 30.2 PG (ref 26.5–33)
MCHC RBC AUTO-ENTMCNC: 33.5 G/DL (ref 31.5–36.5)
MCV RBC AUTO: 90 FL (ref 78–100)
MONOCYTES # BLD AUTO: 0.6 10E9/L (ref 0–1.3)
MONOCYTES NFR BLD AUTO: 5.8 %
NEUTROPHILS # BLD AUTO: 6.6 10E9/L (ref 1.6–8.3)
NEUTROPHILS NFR BLD AUTO: 67.5 %
PLATELET # BLD AUTO: 410 10E9/L (ref 150–450)
RBC # BLD AUTO: 4.96 10E12/L (ref 3.8–5.2)
WBC # BLD AUTO: 9.8 10E9/L (ref 4–11)

## 2018-08-14 PROCEDURE — 85652 RBC SED RATE AUTOMATED: CPT | Performed by: PHYSICIAN ASSISTANT

## 2018-08-14 PROCEDURE — 99495 TRANSJ CARE MGMT MOD F2F 14D: CPT | Performed by: PHYSICIAN ASSISTANT

## 2018-08-14 PROCEDURE — 85025 COMPLETE CBC W/AUTO DIFF WBC: CPT | Performed by: PHYSICIAN ASSISTANT

## 2018-08-14 PROCEDURE — 36415 COLL VENOUS BLD VENIPUNCTURE: CPT | Performed by: PHYSICIAN ASSISTANT

## 2018-08-14 PROCEDURE — 87040 BLOOD CULTURE FOR BACTERIA: CPT | Performed by: PHYSICIAN ASSISTANT

## 2018-08-14 PROCEDURE — 86140 C-REACTIVE PROTEIN: CPT | Performed by: PHYSICIAN ASSISTANT

## 2018-08-14 RX ORDER — AMITRIPTYLINE HYDROCHLORIDE 10 MG/1
TABLET ORAL
Qty: 30 TABLET | Refills: 0 | Status: SHIPPED | OUTPATIENT
Start: 2018-08-14 | End: 2018-08-30

## 2018-08-14 RX ORDER — DOCUSATE SODIUM 100 MG/1
100 CAPSULE, LIQUID FILLED ORAL 2 TIMES DAILY
COMMUNITY
End: 2019-01-17

## 2018-08-14 RX ORDER — FLASH GLUCOSE SENSOR
1 KIT MISCELLANEOUS CONTINUOUS
Qty: 1 DEVICE | Refills: 0 | Status: SHIPPED | OUTPATIENT
Start: 2018-08-14 | End: 2022-10-17

## 2018-08-14 NOTE — TELEPHONE ENCOUNTER
Pt advised of info,  Has decided to start with the HIDA scan and  result,  (pt was advised that if pain is reproduced with dye then will most likely know  result herself at the time.)   Ordered and given number to call to schedule.    Divina Wu RN  [facility]

## 2018-08-14 NOTE — TELEPHONE ENCOUNTER
Gilbert Garcia MD   Fl Jose Armando Mccormack Rn Pool 17 minutes ago (11:36 AM)                  I could see her on Friday as well, but I think that she probably needs to be seen at the  anyway.  Her CT (on 8/11) was unremarkable, and her labs today are OK.  She might need and ERCP.  In the meantime, let's get a HIDA scan to rule out a leak.

## 2018-08-14 NOTE — TELEPHONE ENCOUNTER
Spoke to patient to schedule clinic f/u appointment to go over EGD results per . Patient stated she still needed to schedule an EGD and will call me back to schedule the f/u appointment. Gave her call back number to call and schedule.

## 2018-08-14 NOTE — TELEPHONE ENCOUNTER
Reason for Call:  Other     Detailed comments: Pt had cholecystectomy on 08/01. She was seen in ER and admitted overnight on 08/11 for debilitating pain. Was seen by PCP today and advised to see surgeon. Appt is made for this Friday with a surgeon at the [facility] but wanting to know if she should be seen by the operating surgeon. She was started on amitriptyline today by her PCP - Please advise    Phone Number Patient can be reached at: Home number on file [phone] (home)    Best Time: Any    Can we leave a detailed message on this number? YES    Call taken on 8/14/2018 at 10:29 AM by Denise Behrendt

## 2018-08-14 NOTE — MR AVS SNAPSHOT
After Visit Summary   8/14/2018    Mary Dixon    MRN: [id number]           Patient Information     Date Of Birth          1980        Visit Information        Provider Department      8/14/2018 9:20 AM Riri Meyers PA-C [facility]        Today's Diagnoses     Myofascial pain    -  1    S/P cholecystectomy        Blood glucose elevated           Follow-ups after your visit        Your next 10 appointments already scheduled     Aug 17, 2018 11:00 AM CDT   Post-Op with [facility] SOC PROVIDER   [facility] ([facility])    [address] [place] [id number]   [phone]              Who to contact     Normal or non-critical lab and imaging results will be communicated to you by WoowU[employer]art, letter or phone within 4 business days after the clinic has received the results. If you do not hear from us within 7 days, please contact the clinic through MyChart or phone. If you have a critical or abnormal lab result, we will notify you by phone as soon as possible.  Submit refill requests through [employer] or call your pharmacy and they will forward the refill request to us. Please allow 3 business days for your refill to be completed.          If you need to speak with a  for additional information , please call: [phone]             Additional Information About Your Visit        [employer] Information     [employer] gives you secure access to your electronic health record. If you see a primary care provider, you can also send messages to your care team and make appointments. If you have questions, please call your primary care clinic.  If you do not have a primary care provider, please call [phone] and they will assist you.        Care EveryWhere ID     This is your Care EveryWhere ID. This could be used by other organizations to access your [place] medical records  [id number]        Your Vitals Were     Pulse Temperature Height Last  "Period BMI (Body Mass Index)       86 98.3  F (36.8  C) (Tympanic) 5' 9\" (1.753 m) 07/30/2018 50.95 kg/m2        Blood Pressure from Last 3 Encounters:   08/14/18 (!) 140/91   08/12/18 (!) 141/97   08/06/18 (!) 145/108    Weight from Last 3 Encounters:   08/14/18 (!) 345 lb (156.5 kg)   08/12/18 (!) 352 lb 12.8 oz (160 kg)   08/06/18 (!) 343 lb (155.6 kg)              We Performed the Following     Blood culture     CBC with platelets and differential     CRP, inflammation     ESR: Erythrocyte sedimentation rate          Today's Medication Changes          These changes are accurate as of 8/14/18 10:19 AM.  If you have any questions, ask your nurse or doctor.               Start taking these medicines.        Dose/Directions    amitriptyline 10 MG tablet   Commonly known as:  ELAVIL   Used for:  Myofascial pain   Started by:  Riri Meyers PA-C        Start at 1 tab at bedtime for 3 days, then 2 tabs at bedtime for 3 days, then 3 tabs at bedtime.   Quantity:  30 tablet   Refills:  0       FREESTYLE MIMA READER Trinity   Used for:  Blood glucose elevated   Started by:  Riri Meyers PA-C        Dose:  1 Device   1 Device continuous   Quantity:  1 Device   Refills:  0            Where to get your medicines      These medications were sent to [facility] [address] [place] [address] AVE [address] AT [place] [address] [facility] [address]     Phone:  [phone]     amitriptyline 10 MG tablet    FREESTYLE MIMA READER Trinity                Primary Care Provider Office Phone # Fax #    Aldo Del Valle [phone] [address] [place] [phone]        Equal Access to Services     KINGA YOUNG AH: Rowena patel Sodaina, waaxda luqadaha, qaybta kaalmargy dubon, steven serrato. So [facility] [phone].    ATENCIÓN: Si habla español, tiene a blanchard disposición servicios gratuitos de asistencia " lingüística. Mary al [phone].    We comply with applicable federal civil rights laws and [place] laws. We do not discriminate on the basis of race, color, national origin, age, disability, sex, sexual orientation, or gender identity.            Thank you!     Thank you for choosing [facility]  for your care. Our goal is always to provide you with excellent care. Hearing back from our patients is one way we can continue to improve our services. Please take a few minutes to complete the written survey that you may receive in the mail after your visit with us. Thank you!             Your Updated Medication List - Protect others around you: Learn how to safely use, store and throw away your medicines at www.disposemymeds.org.          This list is accurate as of 8/14/18 10:19 AM.  Always use your most recent med list.                   Brand Name Dispense Instructions for use Diagnosis    amitriptyline 10 MG tablet    ELAVIL    30 tablet    Start at 1 tab at bedtime for 3 days, then 2 tabs at bedtime for 3 days, then 3 tabs at bedtime.    Myofascial pain       docusate sodium 100 MG capsule    COLACE     Take 100 mg by mouth 2 times daily    Myofascial pain       FREESTYLE MIMA READER Trinity     1 Device    1 Device continuous    Blood glucose elevated       IBUPROFEN PO      Take 400 mg by mouth every 8 hours as needed for moderate pain        lisinopril 10 MG tablet    PRINIVIL/ZESTRIL    90 tablet    Take 1 tablet (10 mg) by mouth daily    Essential hypertension       methocarbamol 500 MG tablet    ROBAXIN    240 tablet    Take 1 tablet (500 mg) by mouth 4 times daily    Abdominal pain, right upper quadrant       oxyCODONE IR 5 MG tablet    ROXICODONE    20 tablet    Take 1-2 tablets (5-10 mg) by mouth every 4 hours as needed for other (pain control or improvement in physical function.)    Myofascial pain, Abdominal pain, right upper quadrant       pantoprazole 40 MG EC tablet    PROTONIX    30  tablet    Take 1 tablet (40 mg) by mouth every morning (before breakfast)    Abdominal pain, right upper quadrant       TYLENOL PO      Take 500 mg by mouth every 8 hours as needed for mild pain or fever

## 2018-08-14 NOTE — TELEPHONE ENCOUNTER
To  Dr. Garcia to review and advise.  Can this pt be worked into your schedule?  See message below.      Tayla Vasquez  [facility] RN

## 2018-08-14 NOTE — PROGRESS NOTES
SUBJECTIVE:   Mary Dixon is a 38 year old female who presents to clinic today for the following health issues:          Hospital Follow-up Visit:    Hospital/Nursing Home/IP Rehab Facility: [facility]  Date of Admission: 8/11/18  Date of Discharge: 8/12/18  Reason(s) for Admission: Abdominal Pain            Problems taking medications regularly:  None       Medication changes since discharge: None       Problems adhering to non-medication therapy:  None    Summary of hospitalization:  [facility] discharge summary reviewed  Diagnostic Tests/Treatments reviewed.  Follow up needed: recommended EGD for duodenitis, f/u on A1c  Other Healthcare Providers Involved in Patient s Care:         None  Update since discharge: stable.     Post Discharge Medication Reconciliation: discharge medications reconciled, continue medications without change.  Plan of care communicated with patient     Coding guidelines for this visit:  Type of Medical   Decision Making Face-to-Face Visit       within 7 Days of discharge Face-to-Face Visit        within 14 days of discharge   Moderate Complexity [id number]   High Complexity [id number]              Gallbladder removed on 8/1/18  Says at her day +5 post op appointment she was given medications for pain and started to feel better  Was feeling good and was going to try going back to work this Monday but over the weekend says she was reaching for her phone that fell behind her bed and immediately felt a sharp pain in her right upper quadrant  Was evaluated in the ED  Noted to have possible duodenitis on the CT but otherwise no cause of her pain  Started on protonix and advised to get an EGD in 1-2 weeks  A1c also elevated so advised to f/u with primary    Today patient reports pain continues in her right upper quadrant.   Not there constantly but anytime she laughs, coughs, sneezes or moves in a certain way she will get sharp pain  Was given a muscle relaxer  "and pain meds while inpatient   Says the muscle relaxers just \"knocks her out\" but doesn't really relieve the pain  Doesn't like to take pain meds but says it's the only thing that takes the edge off    [facility] in [facility]     Says she does not want or will not do the EGD  Was reading about duodenitis and says that \"half the population has h pylori\" , she smokes, and has no other symptoms so feels like its a useless test    Admits she is a noncompliant patient. A1c has been higher than her current level before but she refuses to be labeled as diabetic and does not want medications  She would like to get the glucose monitor that goes on her skin so she can keep better track of what makes her sugars get really high    She did take her lisinopril today but admits she doesn't take that daily - only when she believes her blood pressure is high    Problem list and histories reviewed & adjusted, as indicated.  Additional history: as documented    Current Outpatient Prescriptions   Medication Sig Dispense Refill     Acetaminophen (TYLENOL PO) Take 500 mg by mouth every 8 hours as needed for mild pain or fever       amitriptyline (ELAVIL) 10 MG tablet Start at 1 tab at bedtime for 3 days, then 2 tabs at bedtime for 3 days, then 3 tabs at bedtime. 30 tablet 0     Continuous Blood Gluc  (FREESTYLE MIMA READER) BELTRAN 1 Device continuous 1 Device 0     docusate sodium (COLACE) 100 MG capsule Take 100 mg by mouth 2 times daily       IBUPROFEN PO Take 400 mg by mouth every 8 hours as needed for moderate pain       lisinopril (PRINIVIL/ZESTRIL) 10 MG tablet Take 1 tablet (10 mg) by mouth daily 90 tablet 3     methocarbamol (ROBAXIN) 500 MG tablet Take 1 tablet (500 mg) by mouth 4 times daily 240 tablet 0     oxyCODONE IR (ROXICODONE) 5 MG tablet Take 1-2 tablets (5-10 mg) by mouth every 4 hours as needed for other (pain control or improvement in physical function.) 20 tablet 0 " "    pantoprazole (PROTONIX) 40 MG EC tablet Take 1 tablet (40 mg) by mouth every morning (before breakfast) 30 tablet 1     No Known Allergies  BP Readings from Last 3 Encounters:   08/14/18 (!) 140/91   08/12/18 (!) 141/97   08/06/18 (!) 145/108    Wt Readings from Last 3 Encounters:   08/14/18 (!) 345 lb (156.5 kg)   08/12/18 (!) 352 lb 12.8 oz (160 kg)   08/06/18 (!) 343 lb (155.6 kg)                    Reviewed and updated as needed this visit by clinical staff       Reviewed and updated as needed this visit by Provider         ROS:  Remainder of ROS obtained and found to be negative other than that which was documented above      OBJECTIVE:     BP (!) 140/91  Pulse 86  Temp 98.3  F (36.8  C) (Tympanic)  Ht 5' 9\" (1.753 m)  Wt (!) 345 lb (156.5 kg)  LMP 07/30/2018  BMI 50.95 kg/m2  Body mass index is 50.95 kg/(m^2).  GENERAL: healthy, alert and no distress  RESP: lungs clear to auscultation - no rales, rhonchi or wheezes  CV: regular rates and rhythm, normal S1 S2, no S3 or S4 and no murmur, click or rub  ABDOMEN: bowel sounds normal. Tender to palpate in RUQ    Diagnostic Test Results:  pending    ASSESSMENT/PLAN:     (M79.1) Myofascial pain  (primary encounter diagnosis)  Comment: post surgical pain after reaching for something. She was getting better per history until that so likely irritated abdominal wall/muscles. Discussed trial of elavil at bedtime which can help with myofascial pain in some cases and may help with sleep and better pain control so she is not as uncomfortable when first waking. WBC elevated on admission, coming down by discharge. REpeat labs today  Plan: docusate sodium (COLACE) 100 MG capsule,         amitriptyline (ELAVIL) 10 MG tablet, CBC with         platelets and differential, Blood culture, ESR:        Erythrocyte sedimentation rate, CRP,         inflammation            (Z90.49) S/P cholecystectomy  Comment: keep f/u appt on Friday with surgeon  Plan: CBC with platelets and " differential, Blood         culture, ESR: Erythrocyte sedimentation rate,         CRP, inflammation            (E11.9) Type 2 diabetes mellitus without complication, without long-term current use of insulin (H)  Comment: patient noncompliant. Does not like being labeled with this diagnosis  Plan: script for meter     (E66.01) Morbid obesity (H)  Comment:   Plan:     (G93.2) Benign intracranial hypertension  Comment: on lisinopril although not always consistent with taking  Plan: continue lisinopril. Encouraged to be regular with medications        Riri Meyers PA-C  [facility]

## 2018-08-15 ENCOUNTER — TELEPHONE (OUTPATIENT)
Dept: SURGERY | Facility: CLINIC | Age: 38
End: 2018-08-15

## 2018-08-15 ENCOUNTER — HOSPITAL ENCOUNTER (OUTPATIENT)
Dept: NUCLEAR MEDICINE | Facility: CLINIC | Age: 38
Setting detail: NUCLEAR MEDICINE
Discharge: HOME | End: 2018-08-15
Attending: SURGERY | Admitting: SURGERY
Payer: COMMERCIAL

## 2018-08-15 DIAGNOSIS — R10.9 ABDOMINAL PAIN: ICD-10-CM

## 2018-08-15 PROCEDURE — A9537 TC99M MEBROFENIN: HCPCS | Performed by: SURGERY

## 2018-08-15 PROCEDURE — 34300033 ZZH RX 343: Performed by: SURGERY

## 2018-08-15 PROCEDURE — 78227 HEPATOBIL SYST IMAGE W/DRUG: CPT

## 2018-08-15 PROCEDURE — 78226 HEPATOBILIARY SYSTEM IMAGING: CPT

## 2018-08-15 RX ORDER — FLASH GLUCOSE SENSOR
KIT MISCELLANEOUS
Qty: 3 EACH | Refills: 11 | Status: SHIPPED | OUTPATIENT
Start: 2018-08-15 | End: 2019-06-17

## 2018-08-15 RX ORDER — KIT FOR THE PREPARATION OF TECHNETIUM TC 99M MEBROFENIN 45 MG/10ML
5 INJECTION, POWDER, LYOPHILIZED, FOR SOLUTION INTRAVENOUS ONCE
Status: COMPLETED | OUTPATIENT
Start: 2018-08-15 | End: 2018-08-15

## 2018-08-15 RX ADMIN — MEBROFENIN 5 MILLICURIE: 45 INJECTION, POWDER, LYOPHILIZED, FOR SOLUTION INTRAVENOUS at 08:54

## 2018-08-15 NOTE — TELEPHONE ENCOUNTER
Reason for Call:  Other call back    Detailed comments: Pt had a scan done today and want to know if she needs to be seen this Friday?    Phone Number Patient can be reached at: Home number on file [phone] (home) Or My Chart      Best Time: today    Can we leave a detailed message on this number? YES    Call taken on 8/15/2018 at 12:51 PM by Petra Rubio

## 2018-08-16 NOTE — TELEPHONE ENCOUNTER
"Pt advised. States she has not had the gallbladder pain. Is dealing with \"other pain\".  Is still feeling pain especially with increased activity. Pt did see PCP and treatment started for Myofacial pains etc.    Advised needs to take the time to rest and heal.  Advised to call if decides she wishes to be seen tomorrow.  Divina Wu RN  [facility]  "

## 2018-08-17 ENCOUNTER — APPOINTMENT (OUTPATIENT)
Dept: LAB | Facility: CLINIC | Age: 38
End: 2018-08-17
Attending: SURGERY
Payer: COMMERCIAL

## 2018-08-17 ENCOUNTER — OFFICE VISIT (OUTPATIENT)
Dept: SURGERY | Facility: CLINIC | Age: 38
End: 2018-08-17
Attending: SURGERY
Payer: COMMERCIAL

## 2018-08-17 DIAGNOSIS — K91.5 POST-CHOLECYSTECTOMY SYNDROME: Primary | ICD-10-CM

## 2018-08-17 LAB
ALBUMIN SERPL-MCNC: 3.7 G/DL (ref 3.4–5)
ALP SERPL-CCNC: 104 U/L (ref 40–150)
ALT SERPL W P-5'-P-CCNC: 26 U/L (ref 0–50)
ANION GAP SERPL CALCULATED.3IONS-SCNC: 7 MMOL/L (ref 3–14)
AST SERPL W P-5'-P-CCNC: 13 U/L (ref 0–45)
BILIRUB SERPL-MCNC: 0.2 MG/DL (ref 0.2–1.3)
BUN SERPL-MCNC: 13 MG/DL (ref 7–30)
CALCIUM SERPL-MCNC: 9 MG/DL (ref 8.5–10.1)
CHLORIDE SERPL-SCNC: 107 MMOL/L (ref 94–109)
CO2 SERPL-SCNC: 26 MMOL/L (ref 20–32)
CREAT SERPL-MCNC: 0.68 MG/DL (ref 0.52–1.04)
ERYTHROCYTE [DISTWIDTH] IN BLOOD BY AUTOMATED COUNT: 13.2 % (ref 10–15)
GFR SERPL CREATININE-BSD FRML MDRD: >90 ML/MIN/1.7M2
GLUCOSE SERPL-MCNC: 121 MG/DL (ref 70–99)
HCT VFR BLD AUTO: 43.7 % (ref 35–47)
HGB BLD-MCNC: 15 G/DL (ref 11.7–15.7)
MCH RBC QN AUTO: 30.4 PG (ref 26.5–33)
MCHC RBC AUTO-ENTMCNC: 34.3 G/DL (ref 31.5–36.5)
MCV RBC AUTO: 89 FL (ref 78–100)
PLATELET # BLD AUTO: 378 10E9/L (ref 150–450)
POTASSIUM SERPL-SCNC: 4 MMOL/L (ref 3.4–5.3)
PROT SERPL-MCNC: 8 G/DL (ref 6.8–8.8)
RBC # BLD AUTO: 4.93 10E12/L (ref 3.8–5.2)
SODIUM SERPL-SCNC: 140 MMOL/L (ref 133–144)
WBC # BLD AUTO: 7.9 10E9/L (ref 4–11)

## 2018-08-17 PROCEDURE — 80053 COMPREHEN METABOLIC PANEL: CPT | Performed by: SURGERY

## 2018-08-17 PROCEDURE — 36415 COLL VENOUS BLD VENIPUNCTURE: CPT | Performed by: SURGERY

## 2018-08-17 PROCEDURE — 85027 COMPLETE CBC AUTOMATED: CPT | Performed by: SURGERY

## 2018-08-17 RX ORDER — OXYCODONE HYDROCHLORIDE 5 MG/1
5 TABLET ORAL EVERY 6 HOURS PRN
Qty: 20 TABLET | Refills: 0 | Status: SHIPPED | OUTPATIENT
Start: 2018-08-17 | End: 2019-01-17

## 2018-08-17 NOTE — LETTER
8/17/2018       RE: Mary Dixon  [address]  [place] [address]     Dear Colleague,    Thank you for referring your patient, Mary Dixon, to the [facility] SURGICAL CARE OUTPATIENT at [facility]. Please see a copy of my visit note below.    Surgery Clinic Follow Up    Reason for visit:  F/u post cholecystectomy syndrome    HPI:  Mary Dixon is a 38F who underwent lap cholecystectomy at an OSH ~ 3 weeks ago for biliary colic.    She was admitted to the general surgery service at [facility] last week with post cholecystectomy syndrome, which was most notable for spastic pain in the RUQ and pre syncopal symptoms.     She was discharge home on 8/11.      Today, the patient states that she still has intermittent pain in her RUQ.    She denies nausea/ emesis  Her appetite is normal  She has not notice any jaundice.  No fevers/ chills.  No changes in bowel habits.      A/p:  38F with protracted recovery s/p lap cholecystectomy  Would recommended refraining from strenuous activity for an additional week, as much of the patient's discomfort seems muscloskeletal in origin.  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    Adiditional 15 tablets of oxycodone prescribed.    F/u PRN    Violeta Diaz      Again, thank you for allowing me to participate in the care of your patient.      Sincerely,    SOC PROVIDER

## 2018-08-17 NOTE — LETTER
Date:September 12, 2018      Patient was self referred, no letter generated. Do not send.        [facility]

## 2018-08-20 LAB
BACTERIA SPEC CULT: NO GROWTH
Lab: NORMAL
SPECIMEN SOURCE: NORMAL

## 2018-08-24 ENCOUNTER — TELEPHONE (OUTPATIENT)
Dept: FAMILY MEDICINE | Facility: CLINIC | Age: 38
End: 2018-08-24

## 2018-08-24 NOTE — TELEPHONE ENCOUNTER
Reason for Call:  Return to work letter / medication question     Detailed comments:   1)  Mary is requesting a RTW letter returning to work on 8/28/18.  She has a letter from her surgeon releasing her from work through Monday 8/27/18.  She is also needing it to state if she still has restrictions.  Current restrictions state that no lifting above 10-15 lbs. Through 8/31/18.  She states that she's fine without any restrictions.    2)  Amtripyline is working great.  How long does she need to be on it?      Please review and advise.  Thank you..Esme Hedrick    Phone Number Patient can be reached at: Home number on file [phone] (home)    Best Time: any time    Can we leave a detailed message on this number? YES    Call taken on 8/24/2018 at 2:05 PM by Esme Hedrick

## 2018-08-24 NOTE — TELEPHONE ENCOUNTER
Letter written- kept lifting instructions as per surgeon through 8/31 after which all restrictions are lifted.   The length of time she is on amitriptyline depends on how she is doing. AFter a month or so of being on it, she can try to go off and just monitor the pain    Riri

## 2018-08-24 NOTE — LETTER
[facility]  [address]  [facility] [address]  Phone: [phone]    August 24, 2018        Mary Dixon  [address] [address]          To whom it may concern:    RE: Mary Dixon    Patient may return to work 8/28/18 with the following:  No lifting above 10-15lbs. These restrictions are to remain in place through 8/31/18.     She is able to return to work with no restrictions as of 9/1/18      Please contact me for questions or concerns.      Sincerely,        Riri Meyers PA-C

## 2018-08-27 NOTE — TELEPHONE ENCOUNTER
Tried to leave message for Mary of completed letter, but answering machine cut me off.  Letter placed @ .  Thank you..Esme Hedrick

## 2018-08-30 DIAGNOSIS — M79.18 MYOFASCIAL PAIN: ICD-10-CM

## 2018-08-31 RX ORDER — AMITRIPTYLINE HYDROCHLORIDE 10 MG/1
20 TABLET ORAL AT BEDTIME
Qty: 180 TABLET | Refills: 0 | Status: SHIPPED | OUTPATIENT
Start: 2018-08-31 | End: 2019-01-17

## 2018-08-31 NOTE — TELEPHONE ENCOUNTER
"Requested Prescriptions   Pending Prescriptions Disp Refills     amitriptyline (ELAVIL) 10 MG tablet [Pharmacy Med Name: AMITRIPTYLINE 10MG TABLETS] 30 tablet 0    Last Written Prescription Date:  8/14/18  Last Fill Quantity: 30,  # refills: 0   Last office visit: 8/14/2018 with prescribing provider:  8/14/18 hiral   Future Office Visit:     Sig: TAKE 1 TABLET BY MOUTH EVERY NIGHT AT BEDTIME FOR 3 DAYS, THEN TAKE 2 TABLETS FOR 3 DAYS, THEN TAKE 3 TABLETS THEREAFTER    Tricyclic Agents ( Annual appt and no PHQ9) Failed    8/30/2018  5:05 PM       Failed - Blood Pressure under 140/90 in past 12 mos    BP Readings from Last 3 Encounters:   08/14/18 (!) 140/91   08/12/18 (!) 141/97   08/06/18 (!) 145/108                Passed - Recent (12 mo) or future (30 days) visit within authorizing provider's specialty    Patient had office visit in the last 12 months or has a visit in the next 30 days with authorizing provider or within the authorizing provider's specialty.  See \"Patient Info\" tab in inbasket, or \"Choose Columns\" in Meds & Orders section of the refill encounter.           Passed - Patient is age 18 or older       Passed - Patient is not pregnant       Passed - No positive pregnancy test on record in past 12 mos          "

## 2018-08-31 NOTE — TELEPHONE ENCOUNTER
Routing refill request to provider for review/approval because:  Blood pressure not in range.    Holly Sepulveda RN

## 2018-09-12 NOTE — PROGRESS NOTES
Surgery Clinic Follow Up    Reason for visit:  F/u post cholecystectomy syndrome    HPI:  Mary Dixon is a 38F who underwent lap cholecystectomy at an OSH ~ 3 weeks ago for biliary colic.    She was admitted to the general surgery service at [facility] last week with post cholecystectomy syndrome, which was most notable for spastic pain in the RUQ and pre syncopal symptoms.     She was discharge home on 8/11.      Today, the patient states that she still has intermittent pain in her RUQ.    She denies nausea/ emesis  Her appetite is normal  She has not notice any jaundice.  No fevers/ chills.  No changes in bowel habits.      A/p:  38F with protracted recovery s/p lap cholecystectomy  Would recommended refraining from strenuous activity for an additional week, as much of the patient's discomfort seems muscloskeletal in origin.  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    Adiditional 15 tablets of oxycodone prescribed.    F/u PRN    Violeta Diaz

## 2018-09-17 ENCOUNTER — TELEPHONE (OUTPATIENT)
Dept: FAMILY MEDICINE | Facility: CLINIC | Age: 38
End: 2018-09-17

## 2018-09-17 NOTE — TELEPHONE ENCOUNTER
Panel Management Review      Patient has the following on her problem list:     Hypertension   Last three blood pressure readings:  BP Readings from Last 3 Encounters:   08/14/18 (!) 140/91   08/12/18 (!) 141/97   08/06/18 (!) 145/108     Blood pressure: FAILED    HTN Guidelines:  Age 18-59 BP range:  Less than 140/90  Age 60-85 with Diabetes:  Less than 140/90  Age 60-85 without Diabetes:  less than 150/90      Composite cancer screening  Chart review shows that this patient is due/due soon for the following Pap Smear  Summary:    Patient is due/failing the following:   BP CHECK    Action needed:   Patient needs office visit for BP check with clinic RN.    Type of outreach:    Phone, spoke to patient.  Lost connection with pt once asking if she was monitoring her BP's. Will try again at a later time.     Questions for provider review:    Spoke with Dr. Del Valle about pt.                                                                                                                                     Rossi Villavicencio MA  2:16 PM 9/17/2018       Chart routed to none .

## 2018-12-09 ENCOUNTER — OFFICE VISIT (OUTPATIENT)
Dept: URGENT CARE | Facility: URGENT CARE | Age: 38
End: 2018-12-09
Payer: COMMERCIAL

## 2018-12-09 VITALS
TEMPERATURE: 97.8 F | RESPIRATION RATE: 14 BRPM | BODY MASS INDEX: 51.24 KG/M2 | HEART RATE: 64 BPM | WEIGHT: 293 LBS | DIASTOLIC BLOOD PRESSURE: 66 MMHG | SYSTOLIC BLOOD PRESSURE: 122 MMHG

## 2018-12-09 DIAGNOSIS — H65.93 BILATERAL NON-SUPPURATIVE OTITIS MEDIA: Primary | ICD-10-CM

## 2018-12-09 DIAGNOSIS — G44.209 TENSION HEADACHE: ICD-10-CM

## 2018-12-09 DIAGNOSIS — M62.838 NECK MUSCLE SPASM: ICD-10-CM

## 2018-12-09 PROCEDURE — 99214 OFFICE O/P EST MOD 30 MIN: CPT | Performed by: NURSE PRACTITIONER

## 2018-12-09 RX ORDER — AMOXICILLIN 875 MG
875 TABLET ORAL 2 TIMES DAILY
Qty: 20 TABLET | Refills: 0 | Status: SHIPPED | OUTPATIENT
Start: 2018-12-09 | End: 2019-02-25

## 2018-12-09 RX ORDER — CYCLOBENZAPRINE HCL 10 MG
10 TABLET ORAL 3 TIMES DAILY PRN
Qty: 30 TABLET | Refills: 0 | Status: SHIPPED | OUTPATIENT
Start: 2018-12-09 | End: 2018-12-19

## 2018-12-09 NOTE — LETTER
December 9, 2018      Mary Dixon  [address] [address]        To Whom It May Concern:    Mary Dixon was seen in our clinic. Please release the next 1-2 days due to illness.      Sincerely,        LYLE Astudillo CNP

## 2018-12-09 NOTE — PROGRESS NOTES
SUBJECTIVE:   Mary Dixon is a 38 year old female who presents to clinic today for the following health issues:  Chief Complaint   Patient presents with     Neck Problem     Started last week with bad headahce for 3 days.  Excedrine helped.  Neck pain last night swollen lymph nodes     Ear Problem     Has been draining, no pain                  Problem list and histories reviewed & adjusted, as indicated.  Additional history: as documented    Patient Active Problem List   Diagnosis     Benign intracranial hypertension     Status post oophorectomy     Tobacco abuse     Breast lump     Attention deficit disorder of adult     Myofascial pain     CARDIOVASCULAR SCREENING; LDL GOAL LESS THAN 160     Personal history of ovarian cancer     HTN (hypertension)     PMB (postmenopausal bleeding)     Prediabetes     Morbid obesity (H)     Atypical chest pain     Hirsutism     Abdominal pain     Past Surgical History:   Procedure Laterality Date     APPENDECTOMY  3/6/08     LAPAROSCOPIC CHOLECYSTECTOMY WITH CHOLANGIOGRAMS N/A 8/1/2018    Procedure: LAPAROSCOPIC CHOLECYSTECTOMY WITH CHOLANGIOGRAMS;  Laparoscopic Cholecystectomy;  Surgeon: Gilbert Garcia MD;  Location: [facility]     SURGICAL HISTORY OF -   3/6/08    EUA, 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        Social History     Tobacco Use     Smoking status: Current Every Day Smoker     Packs/day: 0.50     Years: 2.00     Pack years: 1.00     Types: Cigarettes     Smokeless tobacco: Never Used   Substance Use Topics     Alcohol use: No     Family History   Problem Relation Age of Onset     Hypertension Father      Diabetes Maternal Grandmother      Alzheimer Disease Maternal Grandmother      Breast Cancer Maternal Grandmother      Heart Disease Maternal Grandfather      Hypertension Maternal Grandfather      Coronary Artery Disease Maternal Grandfather 55        bypass         Current Outpatient Medications   Medication Sig Dispense Refill      Acetaminophen (TYLENOL PO) Take 500 mg by mouth every 8 hours as needed for mild pain or fever       amoxicillin (AMOXIL) 875 MG tablet Take 1 tablet (875 mg) by mouth 2 times daily for 10 days 20 tablet 0     Continuous Blood Gluc  (FREESTYLE MIMA READER) BELTRAN 1 Device continuous 1 Device 0     continuous blood glucose monitoring (FREESTYLE MIMA) sensor For use with Freestyle Mima Flash  for continuous monitioring of blood glucose levels. Replace sensor every 10 days. 3 each 11     cyclobenzaprine (FLEXERIL) 10 MG tablet Take 1 tablet (10 mg) by mouth 3 times daily as needed for muscle spasms 30 tablet 0     IBUPROFEN PO Take 400 mg by mouth every 8 hours as needed for moderate pain       amitriptyline (ELAVIL) 10 MG tablet Take 2 tablets (20 mg) by mouth At Bedtime (Patient not taking: Reported on 12/9/2018) 180 tablet 0     docusate sodium (COLACE) 100 MG capsule Take 100 mg by mouth 2 times daily       lisinopril (PRINIVIL/ZESTRIL) 10 MG tablet Take 1 tablet (10 mg) by mouth daily (Patient not taking: Reported on 12/9/2018) 90 tablet 3     methocarbamol (ROBAXIN) 500 MG tablet Take 1 tablet (500 mg) by mouth 4 times daily (Patient not taking: Reported on 12/9/2018) 240 tablet 0     oxyCODONE IR (ROXICODONE) 5 MG tablet Take 1 tablet (5 mg) by mouth every 6 hours as needed for breakthrough pain (Patient not taking: Reported on 12/9/2018) 20 tablet 0     oxyCODONE IR (ROXICODONE) 5 MG tablet Take 1-2 tablets (5-10 mg) by mouth every 4 hours as needed for other (pain control or improvement in physical function.) (Patient not taking: Reported on 12/9/2018) 20 tablet 0     pantoprazole (PROTONIX) 40 MG EC tablet Take 1 tablet (40 mg) by mouth every morning (before breakfast) (Patient not taking: Reported on 12/9/2018) 30 tablet 1     No Known Allergies  Labs reviewed in EPIC    Reviewed and updated as needed this visit by clinical staff  Tobacco  Allergies  Meds  Problems  Med Hx  Surg Hx   Fam Hx  Soc Hx        Reviewed and updated as needed this visit by Provider  Tobacco  Allergies  Meds  Problems  Med Hx  Surg Hx  Fam Hx         ROS:  Constitutional, HEENT, cardiovascular, pulmonary, GI, , musculoskeletal, neuro, skin, endocrine and psych systems are negative, except as otherwise noted.    OBJECTIVE:     /66 (BP Location: Right arm, Cuff Size: Adult Large)   Pulse 64   Temp 97.8  F (36.6  C) (Tympanic)   Resp 14   Wt (!) 157.4 kg (347 lb)   BMI 51.24 kg/m    Body mass index is 51.24 kg/m .   GENERAL: healthy, alert and no distress, nontoxic in appearance  EYES: Eyes grossly normal to inspection, PERRL and conjunctivae and sclerae normal  HENT: ear canals and TM's intact bilaterally and both with small amount of yellow red fluid, nose and mouth without ulcers or lesions  NECK: no adenopathy, supple with full ROM. Does have muscle spasm in left neck going up into scalp  RESP: lungs clear to auscultation - no rales, rhonchi or wheezes  CV: regular rate and rhythm, normal S1 S2, no S3 or S4, no murmur, click or rub, no peripheral edema   ABDOMEN: soft, nontender, no hepatosplenomegaly, no masses and bowel sounds normal  MS: no gross musculoskeletal defects noted, no edema  No rash    Diagnostic Test Results:  No results found for this or any previous visit (from the past 24 hour(s)).    ASSESSMENT/PLAN:     Problem List Items Addressed This Visit     None      Visit Diagnoses     Bilateral non-suppurative otitis media    -  Primary    Relevant Medications    amoxicillin (AMOXIL) 875 MG tablet    Neck muscle spasm        Relevant Medications    cyclobenzaprine (FLEXERIL) 10 MG tablet    Tension headache        Relevant Medications    cyclobenzaprine (FLEXERIL) 10 MG tablet               Patient Instructions     Take antibiotics and muscle relaxers as directed.    Increase rest and fluids. Tylenol and/or Ibuprofen for comfort. Cool mist vaporizer. If your symptoms worsen or do not  resolve follow up with your primary care provider in 1 week and sooner if needed.      Mucinex 600 mg 12 hour formula for ear, head and chest congestion.  It can also thin post nasal drip which can cause a cough and sore throat.    Indications for emergent return to emergency department discussed with patient, who verbalized good understanding and agreement.  Patient understands the limitations of today's evaluation.           Patient Education     Tension Headache    A muscle tension headache is a very common cause of head pain. It s also called a stress headache. When some people are under stress, they tense the muscles of their shoulder, neck, and scalp without knowing it. If this tension lasts long enough, a headache can occur. A tension headache can be quite painful. It can last for hours or even days.  Home care  Follow these tips when caring for yourself at home:    Don t drive yourself home if you were given pain medicine for your headache. Instead, have someone else drive you home. Try to sleep when you get home. You should feel much better when you wake up.    Put heat on the back of your neck to help ease neck spasm.    Drink only clear liquids or eat a light diet until your symptoms get better. This will help you avoid nausea or vomiting.  How to prevent headaches    Figure out what is causing stress in your life. Learn new ways to handle your stress. Ideas include regular exercise, biofeedback, self-hypnosis, yoga, and meditation. Talk with your healthcare provider to find out more information about managing stress. Many books and digital media are also available on this subject.    Take time out at the first sign of a tension headache, if possible. Take yourself out of the stressful situation. Find a quiet, comfortable place to sit or lie down and let yourself relax. Heat and deep massage of the tight areas in the neck and shoulders may help ease muscle spasm. You may also get relief from a medicine  like ibuprofen or a prescribed muscle relaxant.  Follow-up care  Follow up with your healthcare provider, or as advised. Talk with your provider if you have frequent headaches. He or she can figure out a treatment plan. Ask if you can have medicine to take at home the next time you get a bad headache. This may keep you from having to visit the emergency department in the future. You may need to see a headache specialist (neurologist) if you continue to have headaches.  When to seek medical advice  Call your healthcare provider right away if any of these occur:    Your head pain gets worse during sexual intercourse or strenuous activity    Your head pain doesn t get better within 24 hours    You aren t able to keep liquids down (repeated vomiting)    Fever of 100.4 F (38 C) or higher, or as directed by your healthcare provider    Stiff neck    Extreme drowsiness, confusion, or fainting    Dizziness or dizziness with spinning sensation (vertigo)    Weakness in an arm or leg or one side of your face    You have difficulty speaking    Your vision changes  Date Last Reviewed: 8/1/2016 2000-2018 [employer]. [address] [place] [address]. All rights reserved. This information is not intended as a substitute for professional medical care. Always follow your healthcare professional's instructions.           Patient Education     Neck Spasm   A spasm of the neck muscles can happen after a sudden awkward neck movement. Sleeping with your neck in a crooked position can also cause spasm. Some people respond to emotional stress by tensing the muscles of their neck, shoulders, and upper back. If neck spasm lasts long enough, it can cause a headache.  The treatment described below will usually help the pain to go away in 5 to 7 days. Pain that continues may need further evaluation or other types of treatment such as physical therapy.  Home care    Rest and relax the muscles. Use a comfortable pillow  that supports the head and keeps the spine in a neutral position. The position of the head should not be tilted forward or backward. A rolled up towel may help for a custom fit.    Some people find relief with heat. Heat can be applied with either a warm shower or bath or a moist towel heated in the microwave and massage. Others prefer cold packs. You can make an ice pack by filling a plastic bag that seals at the top with ice cubes or crushed ice and then wrapping it with a thin towel. Try both and use the method that feels best for 15 to 20 minutes, several times a day.    Whether using ice or heat, be careful that you don't injure your skin. Never put ice directly on the skin. Always wrap the ice in a towel or other type of cloth. This is very important, especially in people with poor skin sensation.    Try to reduce your stress level. Emotional stress can lead to neck muscle tension and get in the way of or delay the healing process.    You may use over-the-counter pain medicine to control pain, unless another medicine was prescribed. If you have chronic liver or kidney disease or ever had a stomach ulcer or gastrointestinal bleeding, talk with your healthcare provider before using these medicines.  Follow-up care  Follow up with your healthcare provider if your symptoms don't show signs of improvement after one week. Physical therapy or further tests may be needed.  If X-rays, CT scans, or MRI scans were taken, you will be told of any new findings that may affect your care.  Call 911  Call 911 if you have:    Sudden weakness or numbness in one or both arms or legs    Neck swelling, trouble with or painful swallowing    Trouble breathing    Chest pain   When to seek medical advice  Call your healthcare provider right away if any of these occur:    Pain becomes worse or spreads into one or both arms or legs    Increasing headache with nausea or vomiting    Fever of 100.4 F (38 C) or higher, or as directed by your  healthcare provider    [employer]  Date Last Reviewed: 5/1/2018 2000-2018 [employer] [address] [place] [address], [place] [address]. All rights reserved. This information is not intended as a substitute for professional medical care. Always follow your healthcare professional's instructions.           Patient Education     Otitis Media (Middle-Ear Infection) in Adults  Otitis media is another name for a middle-ear infection. It means an infection behind your eardrum. This kind of ear infection can happen after any condition that keeps fluid from draining from the middle ear. These conditions include allergies, a cold, a sore throat, or a respiratory infection.  Middle-ear infections are common in children, but they can also happen in adults. An ear infection in an adult may mean a more serious problem than in a child. So you may need additional tests. If you have an ear infection, you should see your health care provider for treatment.  What are the types of middle-ear infections?  Infections can affect the middle ear in several ways. They are:    Acute otitis media. This middle-ear infection occurs suddenly. It causes swelling and redness. Fluid and mucus become trapped inside the ear. You can have a fever and ear pain.    Otitis media with effusion. Fluid (effusion) and mucus build up in the middle ear after the infection goes away. You may feel like your middle ear is full. This can continue for months and may affect your hearing.    Chronic otitis media with effusion. Fluid (effusion) remains in the middle ear for a long time. Or it builds up again and again, even though there is no infection. This type of middle-ear infection may be hard to treat. It may also affect your hearing.  Who is more likely to get a middle-ear infection?  You are more likely to get an ear infection if you:    Smoke or are around someone who smokes    Have seasonal or year-round allergy symptoms    Have a cold or other upper  respiratory infection  What causes a middle-ear infection?  The middle ear connects to the throat by a canal called the eustachian tube. This tube helps even out the pressure between the outer ear and the inner ear. A cold or allergy can irritate the tube or cause the area around it to swell. This can keep fluid from draining from the middle ear. The fluid builds up behind the eardrum. Bacteria and viruses can grow in this fluid. The bacteria and viruses cause the middle-ear infection.  What are the symptoms of a middle-ear infection?  Common symptoms of a middle-ear infection in adults are:    Pain in 1 or both ears    Drainage from the ear    Muffled hearing    Sore throat   You may also have a fever. Rarely, your balance can be affected.  These symptoms may be the same as for other conditions. It s important to talk with your health care provider if you think you have a middle-ear infection. If you have a high fever, severe pain behind your ear, or paralysis in your face, see your provider as soon as you can.  How is a middle-ear infection diagnosed?  Your health care provider will take a medical history and do a physical exam. He or she will look at the outer ear and eardrum with an otoscope. The otoscope is a lighted tool that lets your provider see inside the ear. A pneumatic otoscope blows a puff of air into the ear to check how well your eardrum moves. If you eardrum doesn t move well, it may mean you have fluid behind it.  Your provider may also do a test called tympanometry. This test tells how well the middle ear is working. It can find any changes in pressure in the middle ear. Your provider may test your hearing with a tuning fork.  How is a middle-ear infection treated?  A middle-ear infection may be treated with:    Antibiotics, taken by mouth or as ear drops    Medication for pain    Decongestants, antihistamines, or nasal steroids  Your health care provider may also have you try autoinsufflation.  This helps adjust the air pressure in your ear. For this, you pinch your nose and gently exhale. This forces air back through the eustachian tube.  The exact treatment for your ear infection will depend on the type of infection you have. In general, if your symptoms don t get better in 48 to 72 hours, contact your health care provider.  Middle-ear infections can cause long-term problems if not treated. They can lead to:    Infection in other parts of the head    Permanent hearing loss    Paralysis of a nerve in your face  If you have a middle-ear infection that doesn t get better, you may need to see an ear, nose, and throat specialist (otolaryngologist). You may need a CT scan or MRI to check for head and neck cancer.  Ear tubes  Sometimes fluid stays in the middle ear even after you take antibiotics and the infection goes away. In this case, your health care provider may suggest that a small tube be placed in your ear. The tube is put at the opening of the eardrum. The tube keeps fluid from building up and relieves pressure in the middle ear. It can also help you hear better. This surgery is called myringotomy. It is not often done in adults.  The tubes usually fall out on their own after 6 months to a year.    7239-1544 [employer]. [address] [place] [address], [place] [address]. All rights reserved. This information is not intended as a substitute for professional medical care. Always follow your healthcare professional's instructions.               [facility]

## 2018-12-09 NOTE — PATIENT INSTRUCTIONS
Take antibiotics and muscle relaxers as directed.    Increase rest and fluids. Tylenol and/or Ibuprofen for comfort. Cool mist vaporizer. If your symptoms worsen or do not resolve follow up with your primary care provider in 1 week and sooner if needed.      Mucinex 600 mg 12 hour formula for ear, head and chest congestion.  It can also thin post nasal drip which can cause a cough and sore throat.    Indications for emergent return to emergency department discussed with patient, who verbalized good understanding and agreement.  Patient understands the limitations of today's evaluation.           Patient Education     Tension Headache    A muscle tension headache is a very common cause of head pain. It s also called a stress headache. When some people are under stress, they tense the muscles of their shoulder, neck, and scalp without knowing it. If this tension lasts long enough, a headache can occur. A tension headache can be quite painful. It can last for hours or even days.  Home care  Follow these tips when caring for yourself at home:    Don t drive yourself home if you were given pain medicine for your headache. Instead, have someone else drive you home. Try to sleep when you get home. You should feel much better when you wake up.    Put heat on the back of your neck to help ease neck spasm.    Drink only clear liquids or eat a light diet until your symptoms get better. This will help you avoid nausea or vomiting.  How to prevent headaches    Figure out what is causing stress in your life. Learn new ways to handle your stress. Ideas include regular exercise, biofeedback, self-hypnosis, yoga, and meditation. Talk with your healthcare provider to find out more information about managing stress. Many books and digital media are also available on this subject.    Take time out at the first sign of a tension headache, if possible. Take yourself out of the stressful situation. Find a quiet, comfortable place to sit  or lie down and let yourself relax. Heat and deep massage of the tight areas in the neck and shoulders may help ease muscle spasm. You may also get relief from a medicine like ibuprofen or a prescribed muscle relaxant.  Follow-up care  Follow up with your healthcare provider, or as advised. Talk with your provider if you have frequent headaches. He or she can figure out a treatment plan. Ask if you can have medicine to take at home the next time you get a bad headache. This may keep you from having to visit the emergency department in the future. You may need to see a headache specialist (neurologist) if you continue to have headaches.  When to seek medical advice  Call your healthcare provider right away if any of these occur:    Your head pain gets worse during sexual intercourse or strenuous activity    Your head pain doesn t get better within 24 hours    You aren t able to keep liquids down (repeated vomiting)    Fever of 100.4 F (38 C) or higher, or as directed by your healthcare provider    Stiff neck    Extreme drowsiness, confusion, or fainting    Dizziness or dizziness with spinning sensation (vertigo)    Weakness in an arm or leg or one side of your face    You have difficulty speaking    Your vision changes  Date Last Reviewed: 8/1/2016 2000-2018 [employer]. [address] [place] [address]. All rights reserved. This information is not intended as a substitute for professional medical care. Always follow your healthcare professional's instructions.           Patient Education     Neck Spasm   A spasm of the neck muscles can happen after a sudden awkward neck movement. Sleeping with your neck in a crooked position can also cause spasm. Some people respond to emotional stress by tensing the muscles of their neck, shoulders, and upper back. If neck spasm lasts long enough, it can cause a headache.  The treatment described below will usually help the pain to go away in 5 to 7 days.  Pain that continues may need further evaluation or other types of treatment such as physical therapy.  Home care    Rest and relax the muscles. Use a comfortable pillow that supports the head and keeps the spine in a neutral position. The position of the head should not be tilted forward or backward. A rolled up towel may help for a custom fit.    Some people find relief with heat. Heat can be applied with either a warm shower or bath or a moist towel heated in the microwave and massage. Others prefer cold packs. You can make an ice pack by filling a plastic bag that seals at the top with ice cubes or crushed ice and then wrapping it with a thin towel. Try both and use the method that feels best for 15 to 20 minutes, several times a day.    Whether using ice or heat, be careful that you don't injure your skin. Never put ice directly on the skin. Always wrap the ice in a towel or other type of cloth. This is very important, especially in people with poor skin sensation.    Try to reduce your stress level. Emotional stress can lead to neck muscle tension and get in the way of or delay the healing process.    You may use over-the-counter pain medicine to control pain, unless another medicine was prescribed. If you have chronic liver or kidney disease or ever had a stomach ulcer or gastrointestinal bleeding, talk with your healthcare provider before using these medicines.  Follow-up care  Follow up with your healthcare provider if your symptoms don't show signs of improvement after one week. Physical therapy or further tests may be needed.  If X-rays, CT scans, or MRI scans were taken, you will be told of any new findings that may affect your care.  Call 911  Call 911 if you have:    Sudden weakness or numbness in one or both arms or legs    Neck swelling, trouble with or painful swallowing    Trouble breathing    Chest pain   When to seek medical advice  Call your healthcare provider right away if any of these  occur:    Pain becomes worse or spreads into one or both arms or legs    Increasing headache with nausea or vomiting    Fever of 100.4 F (38 C) or higher, or as directed by your healthcare provider    Chills  Date Last Reviewed: 5/1/2018 2000-2018 [employer]. [address] [place] [address]. All rights reserved. This information is not intended as a substitute for professional medical care. Always follow your healthcare professional's instructions.           Patient Education     Otitis Media (Middle-Ear Infection) in Adults  Otitis media is another name for a middle-ear infection. It means an infection behind your eardrum. This kind of ear infection can happen after any condition that keeps fluid from draining from the middle ear. These conditions include allergies, a cold, a sore throat, or a respiratory infection.  Middle-ear infections are common in children, but they can also happen in adults. An ear infection in an adult may mean a more serious problem than in a child. So you may need additional tests. If you have an ear infection, you should see your health care provider for treatment.  What are the types of middle-ear infections?  Infections can affect the middle ear in several ways. They are:    Acute otitis media. This middle-ear infection occurs suddenly. It causes swelling and redness. Fluid and mucus become trapped inside the ear. You can have a fever and ear pain.    Otitis media with effusion. Fluid (effusion) and mucus build up in the middle ear after the infection goes away. You may feel like your middle ear is full. This can continue for months and may affect your hearing.    Chronic otitis media with effusion. Fluid (effusion) remains in the middle ear for a long time. Or it builds up again and again, even though there is no infection. This type of middle-ear infection may be hard to treat. It may also affect your hearing.  Who is more likely to get a middle-ear  infection?  You are more likely to get an ear infection if you:    Smoke or are around someone who smokes    Have seasonal or year-round allergy symptoms    Have a cold or other upper respiratory infection  What causes a middle-ear infection?  The middle ear connects to the throat by a canal called the eustachian tube. This tube helps even out the pressure between the outer ear and the inner ear. A cold or allergy can irritate the tube or cause the area around it to swell. This can keep fluid from draining from the middle ear. The fluid builds up behind the eardrum. Bacteria and viruses can grow in this fluid. The bacteria and viruses cause the middle-ear infection.  What are the symptoms of a middle-ear infection?  Common symptoms of a middle-ear infection in adults are:    Pain in 1 or both ears    Drainage from the ear    Muffled hearing    Sore throat   You may also have a fever. Rarely, your balance can be affected.  These symptoms may be the same as for other conditions. It s important to talk with your health care provider if you think you have a middle-ear infection. If you have a high fever, severe pain behind your ear, or paralysis in your face, see your provider as soon as you can.  How is a middle-ear infection diagnosed?  Your health care provider will take a medical history and do a physical exam. He or she will look at the outer ear and eardrum with an otoscope. The otoscope is a lighted tool that lets your provider see inside the ear. A pneumatic otoscope blows a puff of air into the ear to check how well your eardrum moves. If you eardrum doesn t move well, it may mean you have fluid behind it.  Your provider may also do a test called tympanometry. This test tells how well the middle ear is working. It can find any changes in pressure in the middle ear. Your provider may test your hearing with a tuning fork.  How is a middle-ear infection treated?  A middle-ear infection may be treated  with:    Antibiotics, taken by mouth or as ear drops    Medication for pain    Decongestants, antihistamines, or nasal steroids  Your health care provider may also have you try autoinsufflation. This helps adjust the air pressure in your ear. For this, you pinch your nose and gently exhale. This forces air back through the eustachian tube.  The exact treatment for your ear infection will depend on the type of infection you have. In general, if your symptoms don t get better in 48 to 72 hours, contact your health care provider.  Middle-ear infections can cause long-term problems if not treated. They can lead to:    Infection in other parts of the head    Permanent hearing loss    Paralysis of a nerve in your face  If you have a middle-ear infection that doesn t get better, you may need to see an ear, nose, and throat specialist (otolaryngologist). You may need a CT scan or MRI to check for head and neck cancer.  Ear tubes  Sometimes fluid stays in the middle ear even after you take antibiotics and the infection goes away. In this case, your health care provider may suggest that a small tube be placed in your ear. The tube is put at the opening of the eardrum. The tube keeps fluid from building up and relieves pressure in the middle ear. It can also help you hear better. This surgery is called myringotomy. It is not often done in adults.  The tubes usually fall out on their own after 6 months to a year.    1116-6376 [employer]. [address] [place] [address], [place] [address]. All rights reserved. This information is not intended as a substitute for professional medical care. Always follow your healthcare professional's instructions.

## 2018-12-09 NOTE — NURSING NOTE
"Chief Complaint   Patient presents with     Neck Problem     Started last week with bad headahce for 3 days.  Excedrine helped.  Neck pain last night swollen lymph nodes     Ear Problem     Has been draining, no pain        Initial /66 (BP Location: Right arm, Cuff Size: Adult Large)   Pulse 64   Temp 97.8  F (36.6  C) (Tympanic)   Resp 14   Wt (!) 157.4 kg (347 lb)   BMI 51.24 kg/m   Estimated body mass index is 51.24 kg/m  as calculated from the following:    Height as of 8/14/18: 1.753 m (5' 9\").    Weight as of this encounter: 157.4 kg (347 lb).    Patient presents to the clinic using No DME    Health Maintenance that is potentially due pending provider review:  NONE    n/a    Is there anyone who you would like to be able to receive your results? No  If yes have patient fill out JIM  Rajani Sainz M.A.      "

## 2019-01-15 ENCOUNTER — HOSPITAL ENCOUNTER (EMERGENCY)
Facility: CLINIC | Age: 39
Discharge: HOME | End: 2019-01-15
Attending: PHYSICIAN ASSISTANT | Admitting: PHYSICIAN ASSISTANT
Payer: COMMERCIAL

## 2019-01-15 VITALS
RESPIRATION RATE: 22 BRPM | DIASTOLIC BLOOD PRESSURE: 93 MMHG | HEART RATE: 117 BPM | TEMPERATURE: 97.7 F | OXYGEN SATURATION: 99 % | SYSTOLIC BLOOD PRESSURE: 145 MMHG

## 2019-01-15 DIAGNOSIS — J20.9 ACUTE BRONCHITIS WITH SYMPTOMS > 10 DAYS: ICD-10-CM

## 2019-01-15 DIAGNOSIS — F17.210 CIGARETTE SMOKER: ICD-10-CM

## 2019-01-15 DIAGNOSIS — J01.90 ACUTE NON-RECURRENT SINUSITIS, UNSPECIFIED LOCATION: ICD-10-CM

## 2019-01-15 DIAGNOSIS — R09.81 NASAL CONGESTION: ICD-10-CM

## 2019-01-15 DIAGNOSIS — Z72.0 TOBACCO USE: ICD-10-CM

## 2019-01-15 PROCEDURE — G0463 HOSPITAL OUTPT CLINIC VISIT: HCPCS | Performed by: PHYSICIAN ASSISTANT

## 2019-01-15 PROCEDURE — 99214 OFFICE O/P EST MOD 30 MIN: CPT | Mod: Z6 | Performed by: PHYSICIAN ASSISTANT

## 2019-01-15 RX ORDER — PREDNISONE 20 MG/1
TABLET ORAL
Qty: 10 TABLET | Refills: 0 | Status: SHIPPED | OUTPATIENT
Start: 2019-01-15 | End: 2019-02-25

## 2019-01-15 RX ORDER — DOXYCYCLINE 100 MG/1
100 CAPSULE ORAL 2 TIMES DAILY
Qty: 20 CAPSULE | Refills: 0 | Status: SHIPPED | OUTPATIENT
Start: 2019-01-15 | End: 2019-02-25

## 2019-01-15 NOTE — ED PROVIDER NOTES
History     Chief Complaint   Patient presents with     Cough     HPI  Mary Dixon is a 38 year old female who presents to the urgent care with     ENT Symptoms             Symptoms: cc Present Absent Comment   Fever/Chills   x    Fatigue  x     Muscle Aches   x    Eye Irritation   x    Sneezing  x     Nasal Pranay/Drg  x     Sinus Pressure/Pain  x     Loss of smell   x    Dental pain  x     Sore Throat   x In the beginning with hoarse voice, but nothing currently.    Swollen Glands  x     Ear Pain/Fullness  x     Cough x      Wheeze  x     Chest Pain   x    Shortness of breath   x    Rash   x    Other   x  Slight headache.      Symptom duration:  10 days.    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      Patient states positive tobacco 0.5 ppd for a few years. Patient states she also gets exercised induced asthma, and with prediabetes which she controls with food intake.     Problem list, Medication list, Allergies, and Medical/Social/Surgical histories reviewed in EPIC and updated as appropriate.      Problem List:    Patient Active Problem List    Diagnosis Date Noted     Abdominal pain 08/12/2018     Priority: Medium     Atypical chest pain 01/16/2018     Priority: Medium     Being attributed to anxiety       Hirsutism 01/16/2018     Priority: Medium     Morbid obesity (H) 01/03/2018     Priority: Medium     Prediabetes 02/03/2013     Priority: Medium     PMB (postmenopausal bleeding) 12/26/2012     Priority: Medium     HTN (hypertension) 05/18/2012     Priority: Medium     Personal history of ovarian cancer 05/16/2012     Priority: Medium     Last visit 12/26/12 Dr. Fowler  1.) Patient is aware that she is due her EMBx, but declines general exam today due to flu like symptoms. We will therefore wait to do this at her next surveillance visit.    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 Petra Kaminski is gyn/onc - at [facility]  Did not remove uterus    Per pt report, she has \"lifting restrictions\" and hasn't been able to go back to work. Says her ob/gyn has been filling out the paperwork. She says she has seen PT.    Transdermal estrogen 100mcg patch - saw repro med - Dr. Faria - see scanned documents    Most recent visit with Dr. Fowler 11/09 - pt is off hormones and is to do withdrawal bleeds 4x/year. Has progesterone for this.   Sent to chiropractor for therapy        Benign intracranial hypertension 12/05/2006     Priority: Medium     Diagnosed in 2005  Not on diamox.  Has seen neuro          Past Medical History:    Past Medical History:   Diagnosis Date     Benign intracranial hypertension      Mononucleosis      Ovarian cancer (H)      Pneumonia      Post-menopausal bleeding        Past Surgical History:    Past Surgical History:   Procedure Laterality Date     APPENDECTOMY  3/6/08     LAPAROSCOPIC CHOLECYSTECTOMY WITH CHOLANGIOGRAMS N/A 8/1/2018    Procedure: LAPAROSCOPIC CHOLECYSTECTOMY WITH CHOLANGIOGRAMS;  Laparoscopic Cholecystectomy;  Surgeon: Gilbert Garcia MD;  Location: [facility] OR     SURGICAL HISTORY OF -   3/6/08    EUA, 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        Family History:    Family History   Problem Relation Age of Onset     Hypertension Father      Diabetes Maternal " Grandmother      Alzheimer Disease Maternal Grandmother      Breast Cancer Maternal Grandmother      Heart Disease Maternal Grandfather      Hypertension Maternal Grandfather      Coronary Artery Disease Maternal Grandfather 55        bypass       Social History:  Marital Status:   [2]  Social History     Tobacco Use     Smoking status: Current Every Day Smoker     Packs/day: 0.50     Years: 2.00     Pack years: 1.00     Types: Cigarettes     Smokeless tobacco: Never Used   Substance Use Topics     Alcohol use: No     Drug use: No        Medications:      doxycycline hyclate (VIBRAMYCIN) 100 MG capsule   predniSONE (DELTASONE) 20 MG tablet   Acetaminophen (TYLENOL PO)   amitriptyline (ELAVIL) 10 MG tablet   Continuous Blood Gluc  (FREESTYLE MIMA READER) BELTRAN   continuous blood glucose monitoring (FREESTYLE MIMA) sensor   docusate sodium (COLACE) 100 MG capsule   IBUPROFEN PO   lisinopril (PRINIVIL/ZESTRIL) 10 MG tablet   methocarbamol (ROBAXIN) 500 MG tablet   oxyCODONE IR (ROXICODONE) 5 MG tablet   oxyCODONE IR (ROXICODONE) 5 MG tablet   pantoprazole (PROTONIX) 40 MG EC tablet         Review of Systems    Physical Exam   BP: (!) 145/93  Pulse: 117  Temp: 97.7  F (36.5  C)  Resp: 22  SpO2: 99 %      Physical Exam    Constitutional: healthy, alert, no distress and cooperative   Cardiovascular: RRR. No murmurs, clicks gallops or rub  Respiratory: Lungs clear to auscultation bilaterally. No rales, rhonchi, wheezing appreciated. No accessory muscle use or retractions.   ENT: bilateral TM normal without fluid or infection, neck has bilateral anterior cervical nodes enlarged, pharynx erythematous without exudate, mild bilateral maxillary sinus tenderness, post nasal drip noted and nasal mucosa congested  Abdomen: Abdomen soft, non-tender. BS normal. No masses, organomegaly  SKIN: no suspicious lesions or rashes    No results found for this or any previous visit (from the past 24 hour(s)).            ED  Course        Procedures              Critical Care time:  none               No results found for this or any previous visit (from the past 24 hour(s)).    Medications - No data to display    Assessments & Plan (with Medical Decision Making)     I have reviewed the nursing notes.    I have reviewed the findings, diagnosis, plan and need for follow up with the patient.   38-year-old female presents to urgent care with 10-day history of cough, wheezing, nasal congestion, sinus pain and pressure.  Patient states she has been using over-the-counter medications with no relief.  Patient has a history of tobacco use, exercise-induced asthma per patient, and prediabetic.  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  Patient also traveled to [place] prior to symptoms starting.  On exam lungs clear to auscultation bilaterally with no indication for x-ray at this time.  Sinus tenderness appreciated bilaterally to the maxillary sinuses, with congestion and postnasal drainage appreciated.  Discussed with patient that at this time symptoms still appear to be more viral in origin and trial of prednisone for the cough, congestion, and wheezing discussed however this will elevate her blood sugar levels and states she is fine since she controls her sugars very well with her diet.  Patient given a wait-and-see prescription for doxycycline to use in 3-5 days if symptoms persist or fail to improve with prednisone alone.  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  Patient is symptomatic treatment which was discussed on discharge paperwork for congestion and cough and to continue her Flonase nasal spray as directed.  Patient to monitor blood  pressure with any sort of over-the-counter cough decongestant medication.  Patient to return to the ED if worsening headache, confusion, dizziness, or fevers occur.        Medication List      Started    doxycycline hyclate 100 MG capsule  Commonly known as:  VIBRAMYCIN  100 mg, Oral, 2 TIMES DAILY     predniSONE 20 MG tablet  Commonly known as:  DELTASONE  Take two tablets (= 40mg) each day for 5 (five) days            Final diagnoses:   Acute bronchitis with symptoms > 10 days   Acute non-recurrent sinusitis, unspecified location   Nasal congestion   Tobacco use       1/15/2019   [facility] EMERGENCY DEPARTMENT     Frances Mccormack PA-C  01/15/19 [id number]

## 2019-01-15 NOTE — ED TRIAGE NOTES
Patient presents today with cough, sinus pain. Symptoms started over a week  ago. Arrived to urgent care ambulatory.

## 2019-01-15 NOTE — DISCHARGE INSTRUCTIONS
Use Medication as directed; wait and see with doxycycline prescription. Patient to start prednisone today and if symptoms persist in 3-5 days she can then start the doxycycline.     Hydrate with fluids, rest, cool humidifier.  May use acetaminophen prn.    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     Follow up with PCP in 1-2 weeks.    Go to Emergency Room if sx worsen or change, Shortness of breath, chest pain, headache, persistent fevers, or painful breathing occur.     Patient voiced understanding of instructions given.

## 2019-01-15 NOTE — ED AVS SNAPSHOT
[facility] Emergency Department  [address] [address]  Phone:  [phone]  Fax:  [phone]                                    Mary Dixon   MRN: [id number]    Department:  [facility] Emergency Department   Date of Visit:  1/15/2019           After Visit Summary Signature Page    I have received my discharge instructions, and my questions have been answered. I have discussed any challenges I see with this plan with the nurse or doctor.    ..........................................................................................................................................  Patient/Patient Representative Signature      ..........................................................................................................................................  Patient Representative Print Name and Relationship to Patient    ..................................................               ................................................  Date                                   Time    ..........................................................................................................................................  Reviewed by Signature/Title    ...................................................              ..............................................  Date                                               Time          22EPIC Rev 08/18

## 2019-01-15 NOTE — LETTER
January 15, 2019      To Whom It May Concern:      Mary Dixon was seen in our urgent Department today, 01/15/19. Please excuse her from work today and tomorrow due to illness.       Sincerely,        Frances Mccormack PA-C

## 2019-01-17 ENCOUNTER — OFFICE VISIT (OUTPATIENT)
Dept: FAMILY MEDICINE | Facility: CLINIC | Age: 39
End: 2019-01-17
Payer: COMMERCIAL

## 2019-01-17 VITALS
OXYGEN SATURATION: 97 % | SYSTOLIC BLOOD PRESSURE: 123 MMHG | TEMPERATURE: 97.3 F | HEIGHT: 69 IN | DIASTOLIC BLOOD PRESSURE: 83 MMHG | HEART RATE: 98 BPM | WEIGHT: 293 LBS | BODY MASS INDEX: 43.4 KG/M2

## 2019-01-17 DIAGNOSIS — J01.90 ACUTE SINUSITIS WITH SYMPTOMS > 10 DAYS: Primary | ICD-10-CM

## 2019-01-17 PROCEDURE — 99213 OFFICE O/P EST LOW 20 MIN: CPT | Performed by: PHYSICIAN ASSISTANT

## 2019-01-17 ASSESSMENT — MIFFLIN-ST. JEOR: SCORE: 2301.15

## 2019-01-17 ASSESSMENT — PAIN SCALES - GENERAL: PAINLEVEL: NO PAIN (0)

## 2019-01-17 NOTE — LETTER
[facility]  [address]  [facility] [address]  Phone: [phone]    January 17, 2019        Mary Dixon  [address] [address]          To whom it may concern:    RE: Mary Dixon    Patient was seen and treated today at our clinic and missed work through 1/21/19    Please contact me for questions or concerns.      Sincerely,        Riri Meyers PA-C

## 2019-01-17 NOTE — PROGRESS NOTES
SUBJECTIVE:   Mary Dixon is a 38 year old female who presents to clinic today for the following health issues:      ED/UC Followup:    Facility:  [facility] Emergency Department   Date of visit: 1/15/19  Reason for visit: Bronchitis   Current Status: She is not feeling any better. She is feeling a little worse.      Was told it was bronchitis and given prednisone - makes her feel horrible  Was given doxy but told to wait a few days to start. Still has not started it  Feeling run down  Coughing, sinus pressure/congestion, chills        Problem list and histories reviewed & adjusted, as indicated.  Additional history: as documented    Current Outpatient Medications   Medication Sig Dispense Refill     Acetaminophen (TYLENOL PO) Take 500 mg by mouth every 8 hours as needed for mild pain or fever       Continuous Blood Gluc  (FREESTYLE MIMA READER) BELTRAN 1 Device continuous 1 Device 0     continuous blood glucose monitoring (FREESTYLE MIMA) sensor For use with Freestyle Mima Flash  for continuous monitioring of blood glucose levels. Replace sensor every 10 days. 3 each 11     IBUPROFEN PO Take 400 mg by mouth every 8 hours as needed for moderate pain       lisinopril (PRINIVIL/ZESTRIL) 10 MG tablet Take 1 tablet (10 mg) by mouth daily 90 tablet 3     doxycycline hyclate (VIBRAMYCIN) 100 MG capsule Take 1 capsule (100 mg) by mouth 2 times daily for 10 days (Patient not taking: Reported on 1/17/2019) 20 capsule 0     No Known Allergies  BP Readings from Last 3 Encounters:   01/17/19 123/83   01/15/19 (!) 145/93   12/09/18 122/66    Wt Readings from Last 3 Encounters:   01/17/19 (!) 154.7 kg (341 lb)   12/09/18 (!) 157.4 kg (347 lb)   08/14/18 (!) 156.5 kg (345 lb)                    Reviewed and updated as needed this visit by clinical staff       Reviewed and updated as needed this visit by Provider         ROS:  Remainder of ROS obtained and found to be negative other than that which was  "documented above      OBJECTIVE:     /83   Pulse 98   Temp 97.3  F (36.3  C) (Tympanic)   Ht 1.753 m (5' 9\")   Wt (!) 154.7 kg (341 lb)   SpO2 97%   BMI 50.36 kg/m    Body mass index is 50.36 kg/m .  GENERAL: healthy, alert and no distress  EYES: Eyes grossly normal to inspection  HENT: ear canals and TM's normal, nose and mouth without ulcers or lesions  NECK: no adenopathy  RESP: lungs clear to auscultation - no rales, rhonchi or wheezes  CV: regular rates and rhythm, normal S1 S2, no S3 or S4 and no murmur, click or rub    Diagnostic Test Results:  none     ASSESSMENT/PLAN:     (J01.90) Acute sinusitis with symptoms > 10 days  (primary encounter diagnosis)  Comment:   Plan: stop prednisone given little improvement and it is making her sugars run high which then makes her feel worse. Start doxy  Follow up if not improving          Riri Meyers PA-C  [facility]    "

## 2019-01-23 PROBLEM — E11.9 DIABETES MELLITUS, TYPE 2 (H): Status: ACTIVE | Noted: 2019-01-23

## 2019-02-25 ENCOUNTER — OFFICE VISIT (OUTPATIENT)
Dept: FAMILY MEDICINE | Facility: CLINIC | Age: 39
End: 2019-02-25
Payer: COMMERCIAL

## 2019-02-25 VITALS
WEIGHT: 293 LBS | TEMPERATURE: 97.7 F | RESPIRATION RATE: 16 BRPM | OXYGEN SATURATION: 100 % | HEIGHT: 69 IN | DIASTOLIC BLOOD PRESSURE: 89 MMHG | HEART RATE: 104 BPM | BODY MASS INDEX: 43.4 KG/M2 | SYSTOLIC BLOOD PRESSURE: 136 MMHG

## 2019-02-25 DIAGNOSIS — I10 ESSENTIAL HYPERTENSION: ICD-10-CM

## 2019-02-25 DIAGNOSIS — E66.01 MORBID OBESITY (H): ICD-10-CM

## 2019-02-25 DIAGNOSIS — G93.2 BENIGN INTRACRANIAL HYPERTENSION: ICD-10-CM

## 2019-02-25 DIAGNOSIS — Z29.89 ALTITUDE SICKNESS PROPHYLAXIS: ICD-10-CM

## 2019-02-25 DIAGNOSIS — E11.9 TYPE 2 DIABETES MELLITUS WITHOUT COMPLICATION, WITHOUT LONG-TERM CURRENT USE OF INSULIN (H): ICD-10-CM

## 2019-02-25 DIAGNOSIS — Z72.0 TOBACCO ABUSE: ICD-10-CM

## 2019-02-25 DIAGNOSIS — J06.9 UPPER RESPIRATORY TRACT INFECTION, UNSPECIFIED TYPE: Primary | ICD-10-CM

## 2019-02-25 PROCEDURE — 99214 OFFICE O/P EST MOD 30 MIN: CPT | Performed by: PHYSICIAN ASSISTANT

## 2019-02-25 RX ORDER — ACETAZOLAMIDE 500 MG/1
CAPSULE, EXTENDED RELEASE ORAL
Qty: 6 CAPSULE | Refills: 0 | Status: SHIPPED | OUTPATIENT
Start: 2019-02-25 | End: 2019-09-12

## 2019-02-25 RX ORDER — LISINOPRIL 10 MG/1
10 TABLET ORAL DAILY
Qty: 90 TABLET | Refills: 3 | Status: SHIPPED | OUTPATIENT
Start: 2019-02-25 | End: 2020-04-17

## 2019-02-25 RX ORDER — BUPROPION HYDROCHLORIDE 150 MG/1
TABLET, FILM COATED, EXTENDED RELEASE ORAL
Qty: 180 TABLET | Refills: 3 | Status: SHIPPED | OUTPATIENT
Start: 2019-02-25 | End: 2019-09-12

## 2019-02-25 ASSESSMENT — PAIN SCALES - GENERAL: PAINLEVEL: NO PAIN (0)

## 2019-02-25 ASSESSMENT — MIFFLIN-ST. JEOR: SCORE: 2314.75

## 2019-02-25 NOTE — PROGRESS NOTES
SUBJECTIVE:   Mary Dixon is a 38 year old female who presents to clinic today for the following health issues:      ENT Symptoms             Symptoms: cc Present Absent Comment   Fever/Chills   x    Fatigue  x     Muscle Aches   x    Eye Irritation   x    Sneezing   x    Nasal Pranay/Drg  x     Sinus Pressure/Pain   x    Loss of smell   x    Dental pain   x    Sore Throat  x  Mild    Swollen Glands  x     Ear Pain/Fullness  x  Both ears    Cough  x  Coughing up phlegm, foul smelling    Wheeze  x     Chest Pain   x    Shortness of breath  x     Rash   x    Other   x      Symptom duration:  Wednesday 2/20   Symptom severity:  moderate    Treatments tried:  None   Contacts:  Yes         Medication Followup of Lisinopril 10 mg     Taking Medication as prescribed: yes    Side Effects:  None    Medication Helping Symptoms:  yes       -She is going to [place] at the end of March. She would like a prescription for a medication that helps with altitude sickness.       Problem list and histories reviewed & adjusted, as indicated.  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 Not sure if it happened right away or just when she got to the higher doses    Going to [place] in March for a work conference  Asking for diamox as she has had this before and has worked well for her  Given her h/o of increased intracranial pressure was given the higher dose last time which worked well for her    Current Outpatient Medications   Medication Sig Dispense Refill     Acetaminophen (TYLENOL PO) Take 500 mg by mouth every 8 hours as  "needed for mild pain or fever       Continuous Blood Gluc  (FREESTYLE MIMA READER) BELTRAN 1 Device continuous 1 Device 0     continuous blood glucose monitoring (FREESTYLE MIMA) sensor For use with Freestyle Mima Flash  for continuous monitioring of blood glucose levels. Replace sensor every 10 days. 3 each 11     IBUPROFEN PO Take 400 mg by mouth every 8 hours as needed for moderate pain       lisinopril (PRINIVIL/ZESTRIL) 10 MG tablet Take 1 tablet (10 mg) by mouth daily 90 tablet 3     No Known Allergies  BP Readings from Last 3 Encounters:   02/25/19 (!) 142/96   01/17/19 123/83   01/15/19 (!) 145/93    Wt Readings from Last 3 Encounters:   02/25/19 (!) 156 kg (344 lb)   01/17/19 (!) 154.7 kg (341 lb)   12/09/18 (!) 157.4 kg (347 lb)                    Reviewed and updated as needed this visit by clinical staff       Reviewed and updated as needed this visit by Provider         ROS:  Remainder of ROS obtained and found to be negative other than that which was documented above      OBJECTIVE:     /89   Pulse 104   Temp 97.7  F (36.5  C) (Tympanic)   Resp 16   Ht 1.753 m (5' 9\")   Wt (!) 156 kg (344 lb)   SpO2 100%   BMI 50.80 kg/m    Body mass index is 50.8 kg/m .  GENERAL: healthy, alert and no distress  EYES: Eyes grossly normal to inspection  HENT: ear canals and TM's normal, nose and mouth without ulcers or lesions  NECK: no adenopathy  RESP: lungs clear to auscultation - no rales, rhonchi or wheezes  CV: regular rates and rhythm, normal S1 S2, no S3 or S4 and no murmur, click or rub    Diagnostic Test Results:  none     ASSESSMENT/PLAN:     (J06.9) Upper respiratory tract infection, unspecified type  (primary encounter diagnosis)  Comment:   Plan: treat with over the counter management for now as suspect it is viral in etiology  Follow up if worsening or failure to improve after 10-14 days of symptoms    (I10) Essential hypertension  Comment: stable. continue  Plan: lisinopril " (PRINIVIL/ZESTRIL) 10 MG tablet            (G93.2) Benign intracranial hypertension  Comment:   Plan: diamox for altitude sickness prevention    (Z29.8) Altitude sickness prophylaxis  Comment:   Plan: acetaZOLAMIDE (DIAMOX SEQUEL) 500 MG 12 hr         capsule            (Z72.0) Tobacco abuse  Comment: will try zyban again, monitor for sweating  Plan: buPROPion (ZYBAN) 150 MG 12 hr tablet            (E66.01) Morbid obesity (H)  Comment:   Plan:     (E11.9) Type 2 diabetes mellitus without complication, without long-term current use of insulin (H)  Comment: patient wearing continuous glucose monitor but refusing testing and/or treatment for diabetes  Plan:                     Riri Meyers PA-C  [facility]

## 2019-02-25 NOTE — LETTER
[facility]  [address]  [place] [address]  Phone: [phone]    February 25, 2019        Mary Dixon  [address] [address]          To whom it may concern:    RE: Mary Dixon    Patient was seen and treated today at our clinic and missed work 2/21/19 and 2/22/19    Please contact me for questions or concerns.      Sincerely,        Riri Meyers PA-C

## 2019-04-29 DIAGNOSIS — E11.42 TYPE 2 DIABETES MELLITUS WITH DIABETIC POLYNEUROPATHY, WITHOUT LONG-TERM CURRENT USE OF INSULIN (H): Primary | ICD-10-CM

## 2019-04-29 RX ORDER — FLASH GLUCOSE SENSOR
KIT MISCELLANEOUS
Status: CANCELLED | OUTPATIENT
Start: 2019-04-29

## 2019-04-29 RX ORDER — FLASH GLUCOSE SENSOR
1 KIT MISCELLANEOUS ONCE
Qty: 1 EACH | Refills: 0 | Status: SHIPPED | OUTPATIENT
Start: 2019-04-29 | End: 2019-09-12

## 2019-04-29 RX ORDER — FLASH GLUCOSE SCANNING READER
1 EACH MISCELLANEOUS ONCE
Qty: 1 DEVICE | Refills: 0 | Status: SHIPPED | OUTPATIENT
Start: 2019-04-29 | End: 2019-09-12

## 2019-06-17 ENCOUNTER — OFFICE VISIT (OUTPATIENT)
Dept: FAMILY MEDICINE | Facility: CLINIC | Age: 39
End: 2019-06-17
Payer: COMMERCIAL

## 2019-06-17 VITALS
BODY MASS INDEX: 43.4 KG/M2 | RESPIRATION RATE: 14 BRPM | TEMPERATURE: 97 F | HEIGHT: 69 IN | DIASTOLIC BLOOD PRESSURE: 84 MMHG | WEIGHT: 293 LBS | HEART RATE: 88 BPM | SYSTOLIC BLOOD PRESSURE: 128 MMHG

## 2019-06-17 DIAGNOSIS — I10 ESSENTIAL HYPERTENSION: ICD-10-CM

## 2019-06-17 DIAGNOSIS — E11.9 TYPE 2 DIABETES MELLITUS WITHOUT COMPLICATION, WITHOUT LONG-TERM CURRENT USE OF INSULIN (H): ICD-10-CM

## 2019-06-17 DIAGNOSIS — J01.90 ACUTE SINUSITIS WITH SYMPTOMS > 10 DAYS: Primary | ICD-10-CM

## 2019-06-17 DIAGNOSIS — Z13.6 CARDIOVASCULAR SCREENING; LDL GOAL LESS THAN 160: ICD-10-CM

## 2019-06-17 LAB
ANION GAP SERPL CALCULATED.3IONS-SCNC: 6 MMOL/L (ref 3–14)
BUN SERPL-MCNC: 19 MG/DL (ref 7–30)
CALCIUM SERPL-MCNC: 9.2 MG/DL (ref 8.5–10.1)
CHLORIDE SERPL-SCNC: 107 MMOL/L (ref 94–109)
CHOLEST SERPL-MCNC: 182 MG/DL
CO2 SERPL-SCNC: 24 MMOL/L (ref 20–32)
CREAT SERPL-MCNC: 0.59 MG/DL (ref 0.52–1.04)
GFR SERPL CREATININE-BSD FRML MDRD: >90 ML/MIN/{1.73_M2}
GLUCOSE SERPL-MCNC: 149 MG/DL (ref 70–99)
HBA1C MFR BLD: 7.3 % (ref 0–5.6)
HDLC SERPL-MCNC: 44 MG/DL
LDLC SERPL CALC-MCNC: 107 MG/DL
NONHDLC SERPL-MCNC: 138 MG/DL
POTASSIUM SERPL-SCNC: 4 MMOL/L (ref 3.4–5.3)
SODIUM SERPL-SCNC: 137 MMOL/L (ref 133–144)
T4 FREE SERPL-MCNC: 0.87 NG/DL (ref 0.76–1.46)
TRIGL SERPL-MCNC: 157 MG/DL
TSH SERPL DL<=0.005 MIU/L-ACNC: 7.33 MU/L (ref 0.4–4)

## 2019-06-17 PROCEDURE — 80048 BASIC METABOLIC PNL TOTAL CA: CPT | Performed by: PHYSICIAN ASSISTANT

## 2019-06-17 PROCEDURE — 84443 ASSAY THYROID STIM HORMONE: CPT | Performed by: PHYSICIAN ASSISTANT

## 2019-06-17 PROCEDURE — 83036 HEMOGLOBIN GLYCOSYLATED A1C: CPT | Performed by: PHYSICIAN ASSISTANT

## 2019-06-17 PROCEDURE — 84439 ASSAY OF FREE THYROXINE: CPT | Performed by: PHYSICIAN ASSISTANT

## 2019-06-17 PROCEDURE — 36415 COLL VENOUS BLD VENIPUNCTURE: CPT | Performed by: PHYSICIAN ASSISTANT

## 2019-06-17 PROCEDURE — 80061 LIPID PANEL: CPT | Performed by: PHYSICIAN ASSISTANT

## 2019-06-17 PROCEDURE — 99214 OFFICE O/P EST MOD 30 MIN: CPT | Performed by: PHYSICIAN ASSISTANT

## 2019-06-17 RX ORDER — FLASH GLUCOSE SENSOR
KIT MISCELLANEOUS
Qty: 6 EACH | Refills: 3 | Status: SHIPPED | OUTPATIENT
Start: 2019-06-17 | End: 2020-06-19

## 2019-06-17 RX ORDER — FLASH GLUCOSE SCANNING READER
1 EACH MISCELLANEOUS ONCE
Qty: 1 DEVICE | Refills: 0 | Status: CANCELLED | OUTPATIENT
Start: 2019-06-17 | End: 2019-06-17

## 2019-06-17 RX ORDER — DOXYCYCLINE 100 MG/1
100 CAPSULE ORAL 2 TIMES DAILY
Qty: 20 CAPSULE | Refills: 0 | Status: SHIPPED | OUTPATIENT
Start: 2019-06-17 | End: 2019-09-12

## 2019-06-17 ASSESSMENT — PAIN SCALES - GENERAL: PAINLEVEL: MODERATE PAIN (4)

## 2019-06-17 ASSESSMENT — MIFFLIN-ST. JEOR: SCORE: 2346.04

## 2019-06-17 NOTE — Clinical Note
Please abstract the following data from this visit with this patient into the appropriate field in Epic:Pap smear done on this date: 8/2017 (approximately), by this group: [facility], results were NIL/negative.

## 2019-06-17 NOTE — PROGRESS NOTES
"Subjective     Mary Dixon is a 39 year old female who presents to clinic today for the following health issues:    HPI   ENT Symptoms             Symptoms: cc Present Absent Comment   Fever/Chills   x    Fatigue   x    Muscle Aches   x    Eye Irritation   x    Sneezing   x    Nasal Pranay/Drg   x    Sinus Pressure/Pain   x    Loss of smell   x    Dental pain  x  Right sided tooth pain    Sore Throat   x    Swollen Glands   x    Ear Pain/Fullness  x  Right ear pain    Cough   x    Wheeze   x    Chest Pain   x    Shortness of breath   x    Rash   x    Other   x      Symptom duration:  2-3 weeks    Symptom severity:  moderate    Treatments tried:  Ibuprofen and Tylenol    Contacts:  None       Felt like her ear was throbbing initially   Has had an ear infection in the past and this is not hurting as bad as previous -controlled with ibuprofen/tylenol  In the last week noticed that her tooth was bothering her  Called the dentist but the dentist advised she have her ear checked first  Can't isolate what tooth it is - seems to be on the top and bottom  Cold sensitive - not heat sensitive - also controlled with ibuprofen/tylenol  Some pressure in her right sinus (maxillary)  No fevers  No cough or respiratory symptoms    Says she does clench her teeth when stressed although doesn't really think it's that  However does admit that she has been stressed at work - difficult boss, however starting a new job soon. Only 2 shifts left in her previous job  Also recently got her bachelors so that was stressful but is better    Needs a new order for the 14 days sharmaine  Says it has been wonderful - checking her sugars more because it's so easy and can tell what foods increase her sugars - often realizing it's foods she didn't expect  Doesn't want \"diabetes\" to show up on her problem list because then she gets \"never ending mail\" from clinic about things she needs    Okay with labs today, is fasting  Had a PAP 2 years ago through [facility] - gets endometrial biopsies because of her history although recent ones have been all normal  Had an eye exam this last fall       Reviewed and updated as needed this visit by Provider  Tobacco  Allergies  Meds  Problems  Med Hx  Surg Hx  Fam Hx         Review of Systems   Remainder of ROS obtained and found to be negative other than that which was documented above        Objective    /84   Pulse 88   Temp 97  F (36.1  C) (Tympanic)   Resp 14   Ht 1.753 m (5' 9\")   Wt (!) 159.7 kg (352 lb)   BMI 51.98 kg/m    Body mass index is 51.98 kg/m .  Physical Exam   GENERAL: healthy, alert and no distress  EYES: Eyes grossly normal to inspection  HENT: ear canals and TM's normal, nose and mouth without ulcers or lesions  NECK: small right sided adenopathy  RESP: lungs clear to auscultation - no rales, rhonchi or wheezes  CV: regular rates and rhythm, normal S1 S2, no S3 or S4 and no murmur, click or rub  SKIN: no suspicious lesions or rashes  Diabetic foot exam: normal DP and PT pulses, no trophic changes or ulcerative lesions and normal sensory exam    Diagnostic Test Results:  pending        Assessment & Plan     (J01.90) Acute sinusitis with symptoms > 10 days  (primary encounter diagnosis)  Comment: I have high suspicion she could be having TMJ issues given ear symptoms as well as non localized tooth symptoms both in upper and lower jaw. She does however describe some ongoing right sided maxillary symptoms as well so will treat for possible sinus component  Plan: doxycycline hyclate (VIBRAMYCIN) 100 MG capsule            (E11.9) Type 2 diabetes mellitus without complication, without long-term current use of insulin (H)  Comment: patient does not like diagnosis of diabetes because she feels she gets too many letters in the mail about labs etc... Does have the sharmaine monitor which she feels as really helped her be better about checking sugars and watching what foods she eats  Plan: continuous " blood glucose monitoring (FREESTYLE         MIMA) sensor, Hemoglobin A1c, Albumin Random         Urine Quantitative with Creat Ratio            (Z13.6) CARDIOVASCULAR SCREENING; LDL GOAL LESS THAN 160  Comment:   Plan: Lipid panel reflex to direct LDL Fasting,         Hemoglobin A1c            (I10) Essential hypertension  Comment:   Plan: TSH with free T4 reflex, Basic metabolic panel         (Ca, Cl, CO2, Creat, Gluc, K, Na, BUN),         CANCELED: Albumin Random Urine Quantitative         with Creat Ratio                Return in about 6 months (around 12/17/2019) for follow up.    Riri Meyers PA-C  [facility]

## 2019-09-12 ENCOUNTER — OFFICE VISIT (OUTPATIENT)
Dept: FAMILY MEDICINE | Facility: CLINIC | Age: 39
End: 2019-09-12
Payer: COMMERCIAL

## 2019-09-12 VITALS
WEIGHT: 293 LBS | TEMPERATURE: 98 F | BODY MASS INDEX: 43.4 KG/M2 | HEIGHT: 69 IN | SYSTOLIC BLOOD PRESSURE: 130 MMHG | DIASTOLIC BLOOD PRESSURE: 78 MMHG | HEART RATE: 91 BPM | OXYGEN SATURATION: 98 %

## 2019-09-12 DIAGNOSIS — L98.9 SKIN LESION: ICD-10-CM

## 2019-09-12 DIAGNOSIS — Z29.89 ALTITUDE SICKNESS PROPHYLAXIS: ICD-10-CM

## 2019-09-12 DIAGNOSIS — E11.9 TYPE 2 DIABETES MELLITUS WITHOUT COMPLICATION, WITHOUT LONG-TERM CURRENT USE OF INSULIN (H): ICD-10-CM

## 2019-09-12 DIAGNOSIS — E03.9 HYPOTHYROIDISM, UNSPECIFIED TYPE: ICD-10-CM

## 2019-09-12 DIAGNOSIS — K21.9 GASTROESOPHAGEAL REFLUX DISEASE, ESOPHAGITIS PRESENCE NOT SPECIFIED: Primary | ICD-10-CM

## 2019-09-12 LAB
CREAT UR-MCNC: 173 MG/DL
MICROALBUMIN UR-MCNC: 31 MG/L
MICROALBUMIN/CREAT UR: 18.15 MG/G CR (ref 0–25)

## 2019-09-12 PROCEDURE — 99214 OFFICE O/P EST MOD 30 MIN: CPT | Performed by: PHYSICIAN ASSISTANT

## 2019-09-12 PROCEDURE — 82043 UR ALBUMIN QUANTITATIVE: CPT | Performed by: PHYSICIAN ASSISTANT

## 2019-09-12 RX ORDER — LEVOTHYROXINE SODIUM 50 UG/1
50 TABLET ORAL DAILY
Qty: 90 TABLET | Refills: 3 | Status: SHIPPED | OUTPATIENT
Start: 2019-09-12 | End: 2021-06-15

## 2019-09-12 RX ORDER — PANTOPRAZOLE SODIUM 40 MG/1
40 TABLET, DELAYED RELEASE ORAL DAILY
Qty: 90 TABLET | Refills: 1 | Status: SHIPPED | OUTPATIENT
Start: 2019-09-12 | End: 2021-06-15

## 2019-09-12 RX ORDER — ACETAZOLAMIDE 500 MG/1
CAPSULE, EXTENDED RELEASE ORAL
Qty: 6 CAPSULE | Refills: 0 | Status: SHIPPED | OUTPATIENT
Start: 2019-09-12 | End: 2022-10-17

## 2019-09-12 ASSESSMENT — PAIN SCALES - GENERAL: PAINLEVEL: NO PAIN (0)

## 2019-09-12 ASSESSMENT — MIFFLIN-ST. JEOR: SCORE: 2318.83

## 2019-09-12 NOTE — PROGRESS NOTES
"Subjective     Mary Dixon is a 39 year old female who presents to clinic today for the following health issues:    HPI   Patient is here today to go over her thyroid labs.     Was put on thyroid medication before by an endocrinologist but it gave her horrible indigestion  Tried to take omeprazole at the same time but it didn't help   Has been put on protonix in the past (after her gallbladder surgery) and it helped with no symptoms  Would like to restart thyroid medication but would like to go on protonix as well     Would like a refill on medication for altitude sickness - will be going to [place] again soon for work    Also has some spots on her nose - was told by a dermatologist at work they looked like hemangiomas but she doesn't like thme and would like to have them removed or treated    Reviewed and updated as needed this visit by Provider         Review of Systems   Remainder of ROS obtained and found to be negative other than that which was documented above        Objective    /78   Pulse 91   Temp 98  F (36.7  C) (Tympanic)   Ht 1.753 m (5' 9\")   Wt (!) 156.9 kg (346 lb)   SpO2 98%   BMI 51.10 kg/m    Body mass index is 51.1 kg/m .  Physical Exam   GENERAL: healthy, alert and no distress  RESP: lungs clear to auscultation - no rales, rhonchi or wheezes  CV: regular rates and rhythm, normal S1 S2, no S3 or S4 and no murmur, click or rub  SKIN: small raised red papules along tip of nose    Diagnostic Test Results:  Labs reviewed in Epic        Assessment & Plan     (K21.9) Gastroesophageal reflux disease, esophagitis presence not specified  (primary encounter diagnosis)  Comment: synthroid medication causes indigestion. prilosec doesn't work well, protonix has worked  Plan: pantoprazole (PROTONIX) 40 MG EC tablet            (Z29.8) Altitude sickness prophylaxis  Comment:   Plan: acetaZOLAMIDE (DIAMOX SEQUEL) 500 MG 12 hr         capsule            (E03.9) Hypothyroidism, unspecified " type  Comment:   Plan: levothyroxine (SYNTHROID/LEVOTHROID) 50 MCG         tablet            (L98.9) Skin lesion  Comment:   Plan: DERMATOLOGY REFERRAL              Return in about 3 months (around 12/12/2019) for Lab Work.    Riri Meyers PA-C  [facility]

## 2019-10-10 ENCOUNTER — TELEPHONE (OUTPATIENT)
Dept: FAMILY MEDICINE | Facility: CLINIC | Age: 39
End: 2019-10-10

## 2019-10-10 NOTE — TELEPHONE ENCOUNTER
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  She has a small outbreak of herpes between her thighs and requesting medication.  I did tell her she probably would need to be seen but told her that the clinic RN would call her back.  Please call and assess.  Thank you..Esme Hedrick    Duration (how long have symptoms been present): unknown    Have you been treated for this before? Yes - she stated that she has had this in the past, but did not say how long ago.      Additional comments: [facility]    Phone Number patient can be reached at:  Home number on file [phone] (home)    Best Time:  Any time    Can we leave a detailed message on this number:  YES    Call taken on 10/10/2019 at 8:23 AM by Esme Hedrick

## 2019-10-10 NOTE — TELEPHONE ENCOUNTER
Call placed to patient.  Day 4 of rash.  Raised pustules, skin hot and red, intact.   Entire area about the size of a 50 cent piece, inner thigh above knee.  Tender to touch.    Patient has had this before, about 12 yrs ago.  Resolved without intervention after about a week, kept it covered.    Afebrile.  No recent illness or contacts.  Rash appeared 4 hr after receiving flu vaccine.  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  Was told she needed to get treatment/antibiotics.  Please advise.  Vane Ko RN

## 2019-10-14 NOTE — TELEPHONE ENCOUNTER
I would advise keeping it covered if there are open sores or vesicles. I really don't think any oral medication at this point is going to add much value, especially if all her other sx are improved    Riri

## 2019-10-14 NOTE — TELEPHONE ENCOUNTER
"Mary said she does not think it is shingles. She thinks it is \"herpes; like a cold sore.\"   \"I have had this before, in the exact same spot; and was told it was herpes, like a cold sore.\" She said the last time it happened was somewhere between 2000 and 2005. She does nbot remember the name of the medication that she took.  She said that the area is the same size as it was on 10/10/10; still has pustules. She said that she has kept it covered by [employer]. She said the other symptoms are \" Much better.\"   Not hot.   Does not hurt;   Does not itch.   How do you advise?  Thank you.   Jonathan Doyle RN    "

## 2019-11-04 ENCOUNTER — HEALTH MAINTENANCE LETTER (OUTPATIENT)
Age: 39
End: 2019-11-04

## 2020-02-10 ENCOUNTER — HEALTH MAINTENANCE LETTER (OUTPATIENT)
Age: 40
End: 2020-02-10

## 2020-03-10 ENCOUNTER — TELEPHONE (OUTPATIENT)
Dept: FAMILY MEDICINE | Facility: CLINIC | Age: 40
End: 2020-03-10

## 2020-03-10 NOTE — TELEPHONE ENCOUNTER
Prior Authorization Retail Medication Request    Medication/Dose: Pantoprazole  ICD code (if different than what is on RX):  Gastroesophageal reflux disease, esophagitis presence not specified [K21.9]  Previously Tried and Failed:    Rationale:  Plan limitations 90 in 365 days exceeded    Insurance Name:  [facility]  Insurance ID:  [id number]  Phone: [phone]      Pharmacy Information (if different than what is on RX)  Name:  [place], [place]  Phone:  [phone]

## 2020-03-12 NOTE — TELEPHONE ENCOUNTER
Central Prior Authorization Team   Phone: [phone]    PA Initiation    Medication: Pantoprazole  Insurance Company: [employer] - Phone [phone] Fax [phone]  Pharmacy Filling the Rx: "[id number] - [address] AT [address]  Filling Pharmacy Phone: [phone]  Filling Pharmacy Fax: [phone]  Start Date: 3/12/2020

## 2020-03-13 NOTE — TELEPHONE ENCOUNTER
Prior Authorization Approval    Authorization Effective Date: 2/11/2020  Authorization Expiration Date: 3/12/2021  Medication: Pantoprazole-APPROVED  Approved Dose/Quantity:    Reference #:     Insurance Company: [employer] - Phone [phone] Fax [phone]  Expected CoPay:       CoPay Card Available:      Foundation Assistance Needed:    Which Pharmacy is filling the prescription (Not needed for infusion/clinic administered): [id number] [address] AT [address]  Pharmacy Notified: Yes  Patient Notified: Yes  **Instructed pharmacy to notify patient when script is ready to /ship.**

## 2020-03-17 ENCOUNTER — E-VISIT (OUTPATIENT)
Dept: FAMILY MEDICINE | Facility: CLINIC | Age: 40
End: 2020-03-17
Payer: COMMERCIAL

## 2020-03-17 ENCOUNTER — MYC MEDICAL ADVICE (OUTPATIENT)
Dept: FAMILY MEDICINE | Facility: CLINIC | Age: 40
End: 2020-03-17

## 2020-03-17 DIAGNOSIS — F41.8 SITUATIONAL ANXIETY: Primary | ICD-10-CM

## 2020-03-17 PROCEDURE — 99422 OL DIG E/M SVC 11-20 MIN: CPT | Performed by: PHYSICIAN ASSISTANT

## 2020-03-17 RX ORDER — PROPRANOLOL HYDROCHLORIDE 20 MG/1
20 TABLET ORAL 3 TIMES DAILY PRN
Qty: 30 TABLET | Refills: 0 | Status: SHIPPED | OUTPATIENT
Start: 2020-03-17 | End: 2020-03-23

## 2020-03-17 ASSESSMENT — ANXIETY QUESTIONNAIRES
5. BEING SO RESTLESS THAT IT IS HARD TO SIT STILL: SEVERAL DAYS
7. FEELING AFRAID AS IF SOMETHING AWFUL MIGHT HAPPEN: SEVERAL DAYS
2. NOT BEING ABLE TO STOP OR CONTROL WORRYING: SEVERAL DAYS
6. BECOMING EASILY ANNOYED OR IRRITABLE: SEVERAL DAYS
3. WORRYING TOO MUCH ABOUT DIFFERENT THINGS: SEVERAL DAYS
1. FEELING NERVOUS, ANXIOUS, OR ON EDGE: SEVERAL DAYS
GAD7 TOTAL SCORE: 7
4. TROUBLE RELAXING: SEVERAL DAYS
GAD7 TOTAL SCORE: 7
7. FEELING AFRAID AS IF SOMETHING AWFUL MIGHT HAPPEN: SEVERAL DAYS

## 2020-03-18 ASSESSMENT — ANXIETY QUESTIONNAIRES: GAD7 TOTAL SCORE: 7

## 2020-03-20 ENCOUNTER — MYC MEDICAL ADVICE (OUTPATIENT)
Dept: FAMILY MEDICINE | Facility: CLINIC | Age: 40
End: 2020-03-20
Payer: COMMERCIAL

## 2020-03-20 DIAGNOSIS — F41.8 SITUATIONAL ANXIETY: ICD-10-CM

## 2020-03-20 PROCEDURE — 99207 ZZC NO BILLABLE SERVICE THIS VISIT: CPT | Performed by: PHYSICIAN ASSISTANT

## 2020-03-23 RX ORDER — PROPRANOLOL HYDROCHLORIDE 20 MG/1
20 TABLET ORAL 3 TIMES DAILY PRN
Qty: 270 TABLET | Refills: 0 | Status: SHIPPED | OUTPATIENT
Start: 2020-03-23 | End: 2021-01-11

## 2020-04-17 DIAGNOSIS — I10 ESSENTIAL HYPERTENSION: ICD-10-CM

## 2020-04-17 RX ORDER — LISINOPRIL 10 MG/1
TABLET ORAL
Qty: 30 TABLET | Refills: 1 | Status: SHIPPED | OUTPATIENT
Start: 2020-04-17 | End: 2020-06-22

## 2020-04-17 NOTE — TELEPHONE ENCOUNTER
"Prescription approved for 2 months per [facility] Refill Protocol. Needs labs June 2020.    Requested Prescriptions   Pending Prescriptions Disp Refills     lisinopril (ZESTRIL) 10 MG tablet [Pharmacy Med Name: LISINOPRIL 10MG TABLETS] 90 tablet 3     Sig: TAKE 1 TABLET(10 MG) BY MOUTH DAILY       ACE Inhibitors (Including Combos) Protocol Passed - 4/17/2020  9:34 AM        Passed - Blood pressure under 140/90 in past 12 months     BP Readings from Last 3 Encounters:   09/12/19 130/78   06/17/19 128/84   02/25/19 136/89                 Passed - Recent (12 mo) or future (30 days) visit within the authorizing provider's specialty     Patient has had an office visit with the authorizing provider or a provider within the authorizing providers department within the previous 12 mos or has a future within next 30 days. See \"Patient Info\" tab in inbasket, or \"Choose Columns\" in Meds & Orders section of the refill encounter.              Passed - Medication is active on med list        Passed - Patient is age 18 or older        Passed - No active pregnancy on record        Passed - Normal serum creatinine on file in past 12 months     Recent Labs   Lab Test 06/17/19  0939   CR 0.59       Ok to refill medication if creatinine is low          Passed - Normal serum potassium on file in past 12 months     Recent Labs   Lab Test 06/17/19  0939   POTASSIUM 4.0             Passed - No positive pregnancy test within past 12 months             Last Written Prescription Date:  2/25/19  Last Fill Quantity: 90,  # refills: 3   Last office visit: 9/12/2019 with prescribing provider:  DEAN Meyers   Future Office Visit:      Debbie EPREZ RN, BSN      "

## 2020-06-18 DIAGNOSIS — E11.9 TYPE 2 DIABETES MELLITUS WITHOUT COMPLICATION, WITHOUT LONG-TERM CURRENT USE OF INSULIN (H): ICD-10-CM

## 2020-06-18 NOTE — TELEPHONE ENCOUNTER
Routing refill request to provider for review/approval because:  Not on the G refill protocol     Debbie PEREZ RN, BSN

## 2020-06-19 DIAGNOSIS — I10 ESSENTIAL HYPERTENSION: ICD-10-CM

## 2020-06-19 DIAGNOSIS — E11.9 DIABETES MELLITUS, TYPE 2 (H): Primary | ICD-10-CM

## 2020-06-19 RX ORDER — FLASH GLUCOSE SENSOR
KIT MISCELLANEOUS
Qty: 6 EACH | Refills: 3 | Status: SHIPPED | OUTPATIENT
Start: 2020-06-19 | End: 2021-06-15

## 2020-06-22 RX ORDER — LISINOPRIL 10 MG/1
TABLET ORAL
Qty: 90 TABLET | Refills: 0 | Status: SHIPPED | OUTPATIENT
Start: 2020-06-22 | End: 2021-01-11

## 2020-06-22 NOTE — TELEPHONE ENCOUNTER
Prescription approved per [facility] [place]ill Protocol.  Jonathan Doyle RN

## 2020-09-01 ENCOUNTER — TRANSFERRED RECORDS (OUTPATIENT)
Dept: HEALTH INFORMATION MANAGEMENT | Facility: CLINIC | Age: 40
End: 2020-09-01

## 2020-09-01 LAB — RETINOPATHY: NORMAL

## 2020-11-16 ENCOUNTER — HEALTH MAINTENANCE LETTER (OUTPATIENT)
Age: 40
End: 2020-11-16

## 2021-01-11 DIAGNOSIS — F41.8 SITUATIONAL ANXIETY: ICD-10-CM

## 2021-01-11 DIAGNOSIS — I10 ESSENTIAL HYPERTENSION: ICD-10-CM

## 2021-01-11 RX ORDER — LISINOPRIL 10 MG/1
TABLET ORAL
Qty: 90 TABLET | Refills: 0 | Status: SHIPPED | OUTPATIENT
Start: 2021-01-11 | End: 2021-06-15

## 2021-01-11 RX ORDER — PROPRANOLOL HYDROCHLORIDE 20 MG/1
TABLET ORAL
Qty: 270 TABLET | Refills: 0 | Status: SHIPPED | OUTPATIENT
Start: 2021-01-11 | End: 2021-06-15

## 2021-01-11 NOTE — TELEPHONE ENCOUNTER
Medication is being filled for 1 time refill only due to:  Patient needs labs lipids,bmp,microalbumin. Future labs ordered yes. Patient needs to be seen because it is over one year. since being seen. Jonathan Doyle RN

## 2021-04-04 ENCOUNTER — HEALTH MAINTENANCE LETTER (OUTPATIENT)
Age: 41
End: 2021-04-04

## 2021-05-28 ENCOUNTER — RECORDS - HEALTHEAST (OUTPATIENT)
Dept: ADMINISTRATIVE | Facility: CLINIC | Age: 41
End: 2021-05-28

## 2021-05-29 ENCOUNTER — HEALTH MAINTENANCE LETTER (OUTPATIENT)
Age: 41
End: 2021-05-29

## 2021-06-15 ENCOUNTER — OFFICE VISIT (OUTPATIENT)
Dept: FAMILY MEDICINE | Facility: CLINIC | Age: 41
End: 2021-06-15
Payer: COMMERCIAL

## 2021-06-15 VITALS
TEMPERATURE: 96.5 F | HEART RATE: 87 BPM | SYSTOLIC BLOOD PRESSURE: 138 MMHG | WEIGHT: 293 LBS | DIASTOLIC BLOOD PRESSURE: 74 MMHG | OXYGEN SATURATION: 97 % | HEIGHT: 69 IN | BODY MASS INDEX: 43.4 KG/M2

## 2021-06-15 DIAGNOSIS — E66.01 MORBID OBESITY (H): ICD-10-CM

## 2021-06-15 DIAGNOSIS — I10 ESSENTIAL HYPERTENSION: Primary | ICD-10-CM

## 2021-06-15 DIAGNOSIS — E11.9 TYPE 2 DIABETES MELLITUS WITHOUT COMPLICATION, WITHOUT LONG-TERM CURRENT USE OF INSULIN (H): ICD-10-CM

## 2021-06-15 DIAGNOSIS — K21.00 GASTROESOPHAGEAL REFLUX DISEASE WITH ESOPHAGITIS, UNSPECIFIED WHETHER HEMORRHAGE: ICD-10-CM

## 2021-06-15 DIAGNOSIS — F41.8 SITUATIONAL ANXIETY: ICD-10-CM

## 2021-06-15 DIAGNOSIS — Z20.822 ENCOUNTER FOR LABORATORY TESTING FOR COVID-19 VIRUS: ICD-10-CM

## 2021-06-15 LAB
ANION GAP SERPL CALCULATED.3IONS-SCNC: 5 MMOL/L (ref 3–14)
BUN SERPL-MCNC: 18 MG/DL (ref 7–30)
CALCIUM SERPL-MCNC: 9.1 MG/DL (ref 8.5–10.1)
CHLORIDE SERPL-SCNC: 105 MMOL/L (ref 94–109)
CHOLEST SERPL-MCNC: 211 MG/DL
CO2 SERPL-SCNC: 27 MMOL/L (ref 20–32)
CREAT SERPL-MCNC: 0.77 MG/DL (ref 0.52–1.04)
CREAT UR-MCNC: 164 MG/DL
GFR SERPL CREATININE-BSD FRML MDRD: >90 ML/MIN/{1.73_M2}
GLUCOSE SERPL-MCNC: 141 MG/DL (ref 70–99)
HBA1C MFR BLD: 8 % (ref 0–5.6)
HDLC SERPL-MCNC: 47 MG/DL
LDLC SERPL CALC-MCNC: 128 MG/DL
MICROALBUMIN UR-MCNC: 38 MG/L
MICROALBUMIN/CREAT UR: 23.48 MG/G CR (ref 0–25)
NONHDLC SERPL-MCNC: 164 MG/DL
POTASSIUM SERPL-SCNC: 4 MMOL/L (ref 3.4–5.3)
SODIUM SERPL-SCNC: 137 MMOL/L (ref 133–144)
TRIGL SERPL-MCNC: 180 MG/DL

## 2021-06-15 PROCEDURE — 80061 LIPID PANEL: CPT | Performed by: PHYSICIAN ASSISTANT

## 2021-06-15 PROCEDURE — 83036 HEMOGLOBIN GLYCOSYLATED A1C: CPT | Performed by: PHYSICIAN ASSISTANT

## 2021-06-15 PROCEDURE — 99214 OFFICE O/P EST MOD 30 MIN: CPT | Performed by: PHYSICIAN ASSISTANT

## 2021-06-15 PROCEDURE — 86769 SARS-COV-2 COVID-19 ANTIBODY: CPT | Performed by: PHYSICIAN ASSISTANT

## 2021-06-15 PROCEDURE — 80048 BASIC METABOLIC PNL TOTAL CA: CPT | Performed by: PHYSICIAN ASSISTANT

## 2021-06-15 PROCEDURE — 82043 UR ALBUMIN QUANTITATIVE: CPT | Performed by: PHYSICIAN ASSISTANT

## 2021-06-15 PROCEDURE — 36415 COLL VENOUS BLD VENIPUNCTURE: CPT | Performed by: PHYSICIAN ASSISTANT

## 2021-06-15 RX ORDER — PANTOPRAZOLE SODIUM 40 MG/1
40 TABLET, DELAYED RELEASE ORAL DAILY
Qty: 90 TABLET | Refills: 1 | Status: SHIPPED | OUTPATIENT
Start: 2021-06-15 | End: 2022-10-17

## 2021-06-15 RX ORDER — FLASH GLUCOSE SENSOR
KIT MISCELLANEOUS
Qty: 6 EACH | Refills: 3 | Status: SHIPPED | OUTPATIENT
Start: 2021-06-15 | End: 2024-01-08

## 2021-06-15 RX ORDER — LISINOPRIL 10 MG/1
10 TABLET ORAL DAILY
Qty: 90 TABLET | Refills: 3 | Status: SHIPPED | OUTPATIENT
Start: 2021-06-15 | End: 2022-10-17

## 2021-06-15 RX ORDER — PROPRANOLOL HYDROCHLORIDE 20 MG/1
TABLET ORAL
Qty: 270 TABLET | Refills: 3 | Status: SHIPPED | OUTPATIENT
Start: 2021-06-15 | End: 2022-10-17

## 2021-06-15 ASSESSMENT — MIFFLIN-ST. JEOR: SCORE: 2249.86

## 2021-06-15 ASSESSMENT — ANXIETY QUESTIONNAIRES
IF YOU CHECKED OFF ANY PROBLEMS ON THIS QUESTIONNAIRE, HOW DIFFICULT HAVE THESE PROBLEMS MADE IT FOR YOU TO DO YOUR WORK, TAKE CARE OF THINGS AT HOME, OR GET ALONG WITH OTHER PEOPLE: NOT DIFFICULT AT ALL
2. NOT BEING ABLE TO STOP OR CONTROL WORRYING: SEVERAL DAYS
GAD7 TOTAL SCORE: 10
3. WORRYING TOO MUCH ABOUT DIFFERENT THINGS: SEVERAL DAYS
5. BEING SO RESTLESS THAT IT IS HARD TO SIT STILL: SEVERAL DAYS
6. BECOMING EASILY ANNOYED OR IRRITABLE: MORE THAN HALF THE DAYS
1. FEELING NERVOUS, ANXIOUS, OR ON EDGE: MORE THAN HALF THE DAYS
7. FEELING AFRAID AS IF SOMETHING AWFUL MIGHT HAPPEN: SEVERAL DAYS

## 2021-06-15 ASSESSMENT — PAIN SCALES - GENERAL: PAINLEVEL: NO PAIN (0)

## 2021-06-15 ASSESSMENT — PATIENT HEALTH QUESTIONNAIRE - PHQ9
5. POOR APPETITE OR OVEREATING: MORE THAN HALF THE DAYS
SUM OF ALL RESPONSES TO PHQ QUESTIONS 1-9: 0

## 2021-06-15 NOTE — PROGRESS NOTES
"    Assessment & Plan     (I10) Essential hypertension  (primary encounter diagnosis)  Comment: refilled - no side effects  Plan: lisinopril (ZESTRIL) 10 MG tablet            (E11.9) Type 2 diabetes mellitus without complication, without long-term current use of insulin (H)  Comment: add ozempic - better glucose control but also for weight loss benefit  Plan: continuous blood glucose monitoring (FREESTYLE         MIMA) sensor, Semaglutide,0.25 or 0.5MG/DOS, 2        MG/1.5ML SOPN            (F41.8) Situational anxiety  Comment: works well when she remembers to take it. Increased stress at work lately given short staffing   Plan: propranolol (INDERAL) 20 MG tablet            (K21.00) Gastroesophageal reflux disease with esophagitis, unspecified whether hemorrhage  Comment: refilled  Plan: pantoprazole (PROTONIX) 40 MG EC tablet            (Z20.822) Encounter for laboratory testing for COVID-19 virus  Comment: would like to check for antibodies  Plan: COVID-19 Elmo RBD Evi & Titer Reflex            (E66.01) Morbid obesity (H)  Comment:   Plan: Semaglutide,0.25 or 0.5MG/DOS, 2 MG/1.5ML SOPN              Tobacco Cessation:   reports that she has been smoking cigarettes. She has a 1.00 pack-year smoking history. She has never used smokeless tobacco.    BMI:   Estimated body mass index is 49.18 kg/m  as calculated from the following:    Height as of this encounter: 1.753 m (5' 9\").    Weight as of this encounter: 151 kg (333 lb).     Return in about 3 months (around 9/15/2021) for Lab Work.    DEAN Rausch [facility] FARHAD Hernandez is a 41 year old who presents for the following health issues     HPI     Diabetes Follow-up    How often are you checking your blood sugar? Continuous glucose monitor  What time of day are you checking your blood sugars (select all that apply)?  Continuous  Have you had any blood sugars above 200?  Yes after eating   Have you had any blood sugars below " 70?  No    What symptoms do you notice when your blood sugar is low?  None    What concerns do you have today about your diabetes? None     Do you have any of these symptoms? (Select all that apply)  No numbness or tingling in feet.  No redness, sores or blisters on feet.  No complaints of excessive thirst.  No reports of blurry vision.  No significant changes to weight.    Have you had a diabetic eye exam in the last 12 months? Yes- Date of last eye exam: September 2020,  Location: [facility]        BP Readings from Last 2 Encounters:   06/15/21 138/74   09/12/19 130/78     Hemoglobin A1C (%)   Date Value   06/15/2021 8.0 (H)   06/17/2019 7.3 (H)     LDL Cholesterol Calculated (mg/dL)   Date Value   06/15/2021 128 (H)   06/17/2019 107 (H)       Hypertension Follow-up      Do you check your blood pressure regularly outside of the clinic? Yes     Are you following a low salt diet? No    Are your blood pressures ever more than 140 on the top number (systolic) OR more   than 90 on the bottom number (diastolic), for example 140/90? No    Anxiety Follow-Up    How are you doing with your anxiety since your last visit? Worsened     Are you having other symptoms that might be associated with anxiety? Yes:  panic symptoms     Have you had a significant life event? OTHER: Covid and work      Are you feeling depressed? No    Do you have any concerns with your use of alcohol or other drugs? No    Social History     Tobacco Use     Smoking status: Current Every Day Smoker     Packs/day: 0.50     Years: 2.00     Pack years: 1.00     Types: Cigarettes     Smokeless tobacco: Never Used   Substance Use Topics     Alcohol use: No     Drug use: No     DONELL-7 SCORE 3/17/2020 6/15/2021   Total Score 7 (mild anxiety) -   Total Score 7 10     PHQ 6/15/2021   PHQ-9 Total Score 0   Q9: Thoughts of better off dead/self-harm past 2 weeks Not at all         How many servings of fruits and vegetables do you eat daily?  2-3    On average, how  "many sweetened beverages do you drink each day (Examples: soda, juice, sweet tea, etc.  Do NOT count diet or artificially sweetened beverages)?   0    How many days per week do you exercise enough to make your heart beat faster? 5    How many minutes a day do you exercise enough to make your heart beat faster? 60 or more    How many days per week do you miss taking your medication? 0    Increased stress at work- during/post COVID they have been short staffed and she is seeing more patient's   Certain coworkers tend to given her the more complicated patient's   Got to work the other day and \"couldn't take it\" - ended up going home. May need FMLA but currently her boss is supportive  Propranolol does help when she remembers to take it - will feel much more \"calm\"  - typically only taking once/day    Increased stressors at home recently as well with  and mother in law    Really likes the continuous glucose monitor   States her averages have been around 140        Review of Systems   Remainder of ROS obtained and found to be negative other than that which was documented above        Objective    /74   Pulse 87   Temp 96.5  F (35.8  C) (Tympanic)   Ht 1.753 m (5' 9\")   Wt (!) 151 kg (333 lb)   SpO2 97%   BMI 49.18 kg/m    Body mass index is 49.18 kg/m .  Physical Exam   GENERAL: healthy, alert and no distress  MENTAL STATUS EXAM:               1. Clinical observations: normal rate and tone of speech.Good eye contact and  cooperative in answering questions.              2. Well-oriented in all spheres with coherent, logical, goal directed and relevent thinking.              3. Thought content: No abnormal thought process and auditory hallucinations.              4. Affect and mood: normal/appropriate.               5. Sensorium and cognition: In contact with reality and oriented to time, place and person. No impairment in immediate, recent, or remote memory. Insight adequate and intelligence appeared to " be average.        Results for orders placed or performed in visit on 06/15/21   Albumin Random Urine Quantitative with Creat Ratio     Status: None   Result Value Ref Range    Creatinine Urine 164 mg/dL    Albumin Urine mg/L 38 mg/L    Albumin Urine mg/g Cr 23.48 0 - 25 mg/g Cr   Hemoglobin A1c     Status: Abnormal   Result Value Ref Range    Hemoglobin A1C 8.0 (H) 0 - 5.6 %   Lipid panel reflex to direct LDL Fasting     Status: Abnormal   Result Value Ref Range    Cholesterol 211 (H) <200 mg/dL    Triglycerides 180 (H) <150 mg/dL    HDL Cholesterol 47 (L) >49 mg/dL    LDL Cholesterol Calculated 128 (H) <100 mg/dL    Non HDL Cholesterol 164 (H) <130 mg/dL   Basic metabolic panel     Status: Abnormal   Result Value Ref Range    Sodium 137 133 - 144 mmol/L    Potassium 4.0 3.4 - 5.3 mmol/L    Chloride 105 94 - 109 mmol/L    Carbon Dioxide 27 20 - 32 mmol/L    Anion Gap 5 3 - 14 mmol/L    Glucose 141 (H) 70 - 99 mg/dL    Urea Nitrogen 18 7 - 30 mg/dL    Creatinine 0.77 0.52 - 1.04 mg/dL    GFR Estimate >90 >60 mL/min/[1.73_m2]    GFR Estimate If Black >90 >60 mL/min/[1.73_m2]    Calcium 9.1 8.5 - 10.1 mg/dL

## 2021-06-16 LAB
SARS-COV-2 AB PNL SERPL IA: NEGATIVE
SARS-COV-2 IGG SERPL IA-ACNC: NORMAL

## 2021-06-16 ASSESSMENT — ANXIETY QUESTIONNAIRES: GAD7 TOTAL SCORE: 10

## 2021-08-05 ENCOUNTER — OFFICE VISIT (OUTPATIENT)
Dept: FAMILY MEDICINE | Facility: CLINIC | Age: 41
End: 2021-08-05
Payer: COMMERCIAL

## 2021-08-05 VITALS
HEIGHT: 69 IN | SYSTOLIC BLOOD PRESSURE: 138 MMHG | TEMPERATURE: 97 F | HEART RATE: 79 BPM | BODY MASS INDEX: 43.4 KG/M2 | OXYGEN SATURATION: 98 % | DIASTOLIC BLOOD PRESSURE: 82 MMHG | WEIGHT: 293 LBS

## 2021-08-05 DIAGNOSIS — E11.65 TYPE 2 DIABETES MELLITUS WITH HYPERGLYCEMIA, WITHOUT LONG-TERM CURRENT USE OF INSULIN (H): ICD-10-CM

## 2021-08-05 DIAGNOSIS — N95.0 PMB (POSTMENOPAUSAL BLEEDING): ICD-10-CM

## 2021-08-05 DIAGNOSIS — E66.01 MORBID OBESITY (H): ICD-10-CM

## 2021-08-05 DIAGNOSIS — Z28.20 VACCINE REFUSED BY PATIENT: Primary | ICD-10-CM

## 2021-08-05 DIAGNOSIS — Z85.43 PERSONAL HISTORY OF OVARIAN CANCER: ICD-10-CM

## 2021-08-05 PROCEDURE — 99214 OFFICE O/P EST MOD 30 MIN: CPT | Performed by: PHYSICIAN ASSISTANT

## 2021-08-05 ASSESSMENT — MIFFLIN-ST. JEOR: SCORE: 2231.71

## 2021-08-05 ASSESSMENT — PAIN SCALES - GENERAL: PAINLEVEL: MILD PAIN (3)

## 2021-08-05 NOTE — PROGRESS NOTES
"    Assessment & Plan       (Z28.20) Vaccine refused by patient  (primary encounter diagnosis)  Comment:   Plan: stated her reaction to the flu vaccine does not make her medically exempt from the covid vaccine so will not fill out the form.     (E66.01) Morbid obesity (H)  Comment:   Plan: doing well on ozempic for sugars, also possible benefits for weight    (Z85.43) Personal history of ovarian cancer  Comment:   Plan: in remission - follows with oncology/OB-GYN    (N95.0) PMB (postmenopausal bleeding)  Comment: per patient has been ongoing since her hysterectomy. She has had several biopsies  Plan: due for follow up    (E11.65) Type 2 diabetes mellitus with hyperglycemia, without long-term current use of insulin (H)  Comment:   Plan: continue ozempic as she feels it is working well although does have some gi issues which will hopefully improve the longer she is on it         Tobacco Cessation:   reports that she has been smoking cigarettes. She has a 1.00 pack-year smoking history. She has never used smokeless tobacco.    BMI:   Estimated body mass index is 48.58 kg/m  as calculated from the following:    Height as of this encounter: 1.753 m (5' 9\").    Weight as of this encounter: 149.2 kg (329 lb).     No follow-ups on file.    DEAN Rausch [facility] FARHAD Hernandez is a 41 year old who presents for the following health issues     HPI       Patient is here today to get forms filled out. Possible FMLA and medical exemption.     Tearful today  Found out that she has 20 days to get the COVID vaccine or she could lose her job  Is willing to lose her job over it but does have the medical exemption form with her along with the CDC guidelines about possible reasons for exemption    She states she does not get the flu shot because she had a reaction years ago - hives, urticaria, itching  Has had to fill out exemption for the flu shot each year.  Per CDC form she thinks a reaction " "like that qualifies as being able to be exempt from the COVID shot  Has her [employer] employee records with her but there is no documentation of that reaction    States even if that didn't qualify her she \"doesn't want it\"     Also wants to mention that after our last visit she started bleeding (menstrual) and bled for a month  She is post menopausal, h/o ovarian cancer, S/p hysterectomy  This is not new for her - she has had post menopausal bleeding and has had several biopsies per oncology because of it  She is due for a follow up with oncology    Taking the ozempic - up to 0.5mg   Gets an upset stomach and nausea the first 2 days after giving  Has found that if she doesn't eat much she feels better those days  Says it's tolerable and doesn't want to change because it has helped her sugars  Read that this is normal the first couple months and then usually improves    Propranolol working well for her   If she doesn't take it states she \"is a wreck\"        Review of Systems   Remainder of ROS obtained and found to be negative other than that which was documented above        Objective    /82   Pulse 79   Temp 97  F (36.1  C) (Tympanic)   Ht 1.753 m (5' 9\")   Wt 149.2 kg (329 lb)   SpO2 98%   BMI 48.58 kg/m    Body mass index is 48.58 kg/m .  Physical Exam   GENERAL: healthy, alert and no distress  PSYCH: mentation appears normal, affect normal          "

## 2021-08-23 ENCOUNTER — TELEPHONE (OUTPATIENT)
Dept: FAMILY MEDICINE | Facility: CLINIC | Age: 41
End: 2021-08-23

## 2021-08-23 DIAGNOSIS — E66.01 MORBID OBESITY (H): ICD-10-CM

## 2021-08-23 DIAGNOSIS — E11.9 TYPE 2 DIABETES MELLITUS WITHOUT COMPLICATION, WITHOUT LONG-TERM CURRENT USE OF INSULIN (H): ICD-10-CM

## 2021-09-18 ENCOUNTER — HEALTH MAINTENANCE LETTER (OUTPATIENT)
Age: 41
End: 2021-09-18

## 2021-10-11 ENCOUNTER — OFFICE VISIT (OUTPATIENT)
Dept: FAMILY MEDICINE | Facility: CLINIC | Age: 41
End: 2021-10-11
Payer: COMMERCIAL

## 2021-10-11 VITALS
BODY MASS INDEX: 43.4 KG/M2 | HEART RATE: 104 BPM | DIASTOLIC BLOOD PRESSURE: 98 MMHG | HEIGHT: 69 IN | OXYGEN SATURATION: 98 % | TEMPERATURE: 98.1 F | WEIGHT: 293 LBS | SYSTOLIC BLOOD PRESSURE: 148 MMHG

## 2021-10-11 DIAGNOSIS — F43.23 ADJUSTMENT DISORDER WITH MIXED ANXIETY AND DEPRESSED MOOD: Primary | ICD-10-CM

## 2021-10-11 DIAGNOSIS — E11.65 TYPE 2 DIABETES MELLITUS WITH HYPERGLYCEMIA, WITHOUT LONG-TERM CURRENT USE OF INSULIN (H): ICD-10-CM

## 2021-10-11 LAB — HBA1C MFR BLD: 6.9 % (ref 0–5.6)

## 2021-10-11 PROCEDURE — 36415 COLL VENOUS BLD VENIPUNCTURE: CPT | Performed by: PHYSICIAN ASSISTANT

## 2021-10-11 PROCEDURE — 99214 OFFICE O/P EST MOD 30 MIN: CPT | Performed by: PHYSICIAN ASSISTANT

## 2021-10-11 PROCEDURE — 83036 HEMOGLOBIN GLYCOSYLATED A1C: CPT | Performed by: PHYSICIAN ASSISTANT

## 2021-10-11 ASSESSMENT — MIFFLIN-ST. JEOR: SCORE: 2231.71
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
## 2021-10-11 NOTE — LETTER
[facility]  [address] [address]  Phone: [phone]    October 11, 2021        Mary Dixon  [address] [address]          To whom it may concern:    RE: Mary Dixon    Patient is under my close care in clinic. We started a new medication in June 2021 and have been trying to manage side effects from the medication since that time. Due to these side effects that are not conducive to working she has missed some days at work intermittently and has made me aware of this and we have made adjustments to hopefully correct these side effects    Please contact me for questions or concerns.      Sincerely,        Riri Meyers PA-C

## 2021-11-19 ASSESSMENT — ANXIETY QUESTIONNAIRES
1. FEELING NERVOUS, ANXIOUS, OR ON EDGE: SEVERAL DAYS
4. TROUBLE RELAXING: SEVERAL DAYS
7. FEELING AFRAID AS IF SOMETHING AWFUL MIGHT HAPPEN: SEVERAL DAYS
8. IF YOU CHECKED OFF ANY PROBLEMS, HOW DIFFICULT HAVE THESE MADE IT FOR YOU TO DO YOUR WORK, TAKE CARE OF THINGS AT HOME, OR GET ALONG WITH OTHER PEOPLE?: SOMEWHAT DIFFICULT
GAD7 TOTAL SCORE: 6
5. BEING SO RESTLESS THAT IT IS HARD TO SIT STILL: SEVERAL DAYS
2. NOT BEING ABLE TO STOP OR CONTROL WORRYING: SEVERAL DAYS
6. BECOMING EASILY ANNOYED OR IRRITABLE: NOT AT ALL
GAD7 TOTAL SCORE: 6
7. FEELING AFRAID AS IF SOMETHING AWFUL MIGHT HAPPEN: SEVERAL DAYS
3. WORRYING TOO MUCH ABOUT DIFFERENT THINGS: SEVERAL DAYS
GAD7 TOTAL SCORE: 6

## 2021-11-20 ASSESSMENT — ANXIETY QUESTIONNAIRES: GAD7 TOTAL SCORE: 6

## 2021-11-22 ENCOUNTER — OFFICE VISIT (OUTPATIENT)
Dept: FAMILY MEDICINE | Facility: CLINIC | Age: 41
End: 2021-11-22
Payer: COMMERCIAL

## 2021-11-22 VITALS
DIASTOLIC BLOOD PRESSURE: 74 MMHG | OXYGEN SATURATION: 99 % | HEART RATE: 68 BPM | HEIGHT: 69 IN | TEMPERATURE: 97.3 F | WEIGHT: 293 LBS | BODY MASS INDEX: 43.4 KG/M2 | SYSTOLIC BLOOD PRESSURE: 136 MMHG

## 2021-11-22 DIAGNOSIS — R73.03 PREDIABETES: ICD-10-CM

## 2021-11-22 DIAGNOSIS — K59.00 CONSTIPATION, UNSPECIFIED CONSTIPATION TYPE: ICD-10-CM

## 2021-11-22 DIAGNOSIS — F41.9 ANXIETY: Primary | ICD-10-CM

## 2021-11-22 PROCEDURE — 99214 OFFICE O/P EST MOD 30 MIN: CPT | Performed by: PHYSICIAN ASSISTANT

## 2021-11-22 RX ORDER — ASPIRIN 81 MG
100 TABLET, DELAYED RELEASE (ENTERIC COATED) ORAL 2 TIMES DAILY
Qty: 180 TABLET | Refills: 1 | Status: SHIPPED | OUTPATIENT
Start: 2021-11-22 | End: 2024-01-08

## 2021-11-22 RX ORDER — GLUCOSAMINE HCL/CHONDROITIN SU 500-400 MG
CAPSULE ORAL
Qty: 100 EACH | Refills: 3 | Status: SHIPPED | OUTPATIENT
Start: 2021-11-22 | End: 2024-01-08

## 2021-11-22 RX ORDER — ESCITALOPRAM OXALATE 10 MG/1
10 TABLET ORAL DAILY
Qty: 90 TABLET | Refills: 1 | Status: SHIPPED | OUTPATIENT
Start: 2021-11-22 | End: 2022-10-17

## 2021-11-22 RX ORDER — HYDROXYZINE HYDROCHLORIDE 25 MG/1
25-50 TABLET, FILM COATED ORAL 3 TIMES DAILY PRN
Qty: 90 TABLET | Refills: 1 | Status: SHIPPED | OUTPATIENT
Start: 2021-11-22 | End: 2022-10-17

## 2021-11-22 RX ORDER — LANCETS
EACH MISCELLANEOUS
Qty: 100 EACH | Refills: 6 | Status: SHIPPED | OUTPATIENT
Start: 2021-11-22 | End: 2024-01-08

## 2021-11-22 ASSESSMENT — MIFFLIN-ST. JEOR: SCORE: 2254.39

## 2021-11-22 ASSESSMENT — PAIN SCALES - GENERAL: PAINLEVEL: NO PAIN (0)
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
## 2022-01-08 ENCOUNTER — HEALTH MAINTENANCE LETTER (OUTPATIENT)
Age: 42
End: 2022-01-08

## 2022-01-14 ENCOUNTER — VIRTUAL VISIT (OUTPATIENT)
Dept: FAMILY MEDICINE | Facility: CLINIC | Age: 42
End: 2022-01-14
Payer: COMMERCIAL

## 2022-01-14 DIAGNOSIS — F41.9 ANXIETY: ICD-10-CM

## 2022-01-14 DIAGNOSIS — R00.2 PALPITATIONS: Primary | ICD-10-CM

## 2022-01-14 PROCEDURE — 99213 OFFICE O/P EST LOW 20 MIN: CPT | Mod: 95 | Performed by: PHYSICIAN ASSISTANT

## 2022-01-14 RX ORDER — METOPROLOL SUCCINATE 25 MG/1
25 TABLET, EXTENDED RELEASE ORAL DAILY
Qty: 90 TABLET | Refills: 3 | Status: SHIPPED | OUTPATIENT
Start: 2022-01-14 | End: 2022-10-17

## 2022-01-14 NOTE — PROGRESS NOTES
"Mary is a 41 year old who is being evaluated via a billable video visit.      How would you like to obtain your AVS? MyChart  If the video visit is dropped, the invitation should be resent by: Call: [phone]   Will anyone else be joining your video visit? No    Video Start Time: 1641    Assessment & Plan     (R00.2) Palpitations  (primary encounter diagnosis)  Comment: was on previously and tolerated well. REstart for palpitations  Plan: metoprolol succinate ER (TOPROL-XL) 25 MG 24 hr        tablet            (F41.9) Anxiety  Comment: increased given job stress at work, unfortunately denied a ADITHYA so had to quit  Plan: will complete FMLA forms extending prior from 2 months to 6 months pending outcome of recent leave  Now that she is not at work, using less propranolol          Tobacco Cessation:   reports that she has been smoking cigarettes. She has a 1.00 pack-year smoking history. She has never used smokeless tobacco.    BMI:   Estimated body mass index is 49.32 kg/m  as calculated from the following:    Height as of 11/22/21: 1.753 m (5' 9\").    Weight as of 11/22/21: 151.5 kg (334 lb).     No follow-ups on file.    Riri Meyers PA-C  [facility] FARHAD    Torrie Hernandez is a 41 year old who presents for the following health issues     HPI       Patient is wanting to follow up with you after returning to work.     As it came time to return to work her anxiety increased and continued to struggle with palpitations and overall not feeling well   Discussed with her manager who tried to see if she could get an extended ADITHYA but was denied and had to terminate her position instead effective 1/11/22  Helping with her friend who has a 16, 14, 9 and 1 year old - , helping with schooling  Enjoying it - less anxious and does not miss work as she has heard the remaining staff continue to be stretched thin and it is not a pleasant work environment right now  Will likely not have insurance - " thinks she will be okay on medications for awhile but wanted to make me aware of the situation    Wondering if a beta blocker would help with the palpitations  Has cut back on the         Review of Systems   Remainder of ROS obtained and found to be negative other than that which was documented above        Objective           Vitals:  No vitals were obtained today due to virtual visit.    Physical Exam   GENERAL: Healthy, alert and no distress  EYES: Eyes grossly normal to inspection.  No discharge or erythema, or obvious scleral/conjunctival abnormalities.  RESP: No audible wheeze, cough, or visible cyanosis.  No visible retractions or increased work of breathing.    SKIN: Visible skin clear. No significant rash, abnormal pigmentation or lesions.  NEURO: Cranial nerves grossly intact.  Mentation and speech appropriate for age.  PSYCH: Mentation appears normal, affect normal/bright, judgement and insight intact, normal speech and appearance well-groomed.      Video-Visit Details    Type of service:  Video Visit    Video End Time:1656    Originating Location (pt. Location): Home    Distant Location (provider location):  [facility]     Platform used for Video Visit: [employer]

## 2022-03-18 ENCOUNTER — TELEPHONE (OUTPATIENT)
Dept: BEHAVIORAL HEALTH | Facility: CLINIC | Age: 42
End: 2022-03-18
Payer: COMMERCIAL

## 2022-03-18 NOTE — TELEPHONE ENCOUNTER
Spoke with pt to remind them about their video visit on Tuesday 3/22/22 at 9 am, that will be connect through via My Chart.    Pt. Doesn't sound too interesting in their appt, but is aware their appt time. Pt refuse verbal consent over the phone for a general consent for virtual, because pt wants to look at an actually form of the general consent.     Write mentioned, pt may take a look at an actual General Consent  form through their My Chart account and can decide to sign it or not.

## 2022-03-22 ENCOUNTER — VIRTUAL VISIT (OUTPATIENT)
Dept: PSYCHOLOGY | Facility: CLINIC | Age: 42
End: 2022-03-22
Payer: COMMERCIAL

## 2022-03-22 DIAGNOSIS — F41.9 ANXIETY: Primary | ICD-10-CM

## 2022-03-22 PROCEDURE — 90834 PSYTX W PT 45 MINUTES: CPT | Mod: 95 | Performed by: COUNSELOR

## 2022-03-22 NOTE — PROGRESS NOTES
[facility]   Mental Health & Addiction Services     Progress Note - Initial Visit    Patient  Name:  Mary Dixon Date: 3/22/22         Service Type: Individual     Visit Start Time: 9  Visit End Time: 9:45    Visit #: 1    Attendees: Client attended alone    Service Modality:  Video Visit:      Provider verified identity through the following two step process.  Patient provided:  Patient     Telemedicine Visit: The patient's condition can be safely assessed and treated via synchronous audio and visual telemedicine encounter.      Reason for Telemedicine Visit: Services only offered telehealth    Originating Site (Patient Location): Patient's other - in car in [place]    Distant Site (Provider Location): Provider Remote Setting- Home Office    Consent:  The patient/guardian has verbally consented to: the potential risks and benefits of telemedicine (video visit) versus in person care; bill my insurance or make self-payment for services provided; and responsibility for payment of non-covered services.     Patient would like the video invitation sent by:  My Chart    Mode of Communication:  Video Conference via [employer]    As the provider I attest to compliance with applicable laws and regulations related to telemedicine.       DATA:   Interactive Complexity: No   Crisis: No    Client attends session seeking individual therapy services to address anxiety and adjustment challenges that have impacted functioning levels. Notes challenges with interpersonal relationships and vocational functioning.       ASSESSMENT:  Mental Status Assessment:  Appearance:   Appropriate   Eye Contact:   Good   Psychomotor Behavior: Normal   Attitude:   Cooperative   Orientation:   All  Speech   Rate / Production: Normal/ Responsive   Volume:  Normal   Mood:    Anxious   Affect:    Appropriate   Thought Content:  Clear   Thought Form:  Coherent  Logical   Insight:    Fair       Safety Issues and Plan for Safety and  Risk Management:     Patient denies current fears or concerns for personal safety.  Patient denies current or recent suicidal ideation or behaviors.  Patient denies current or recent homicidal ideation or behaviors.  Patient denies current or recent self injurious behavior or ideation.  Patient denies other safety concerns.  Recommended that patient call 911 or go to the local ED should there be a change in any of these risk factors.  Patient reports there are firearms in the house. The firearms are secured in a locked space.    Diagnostic Criteria:  Unspecified Anxiety Disorder , Symptoms characteristic of an anxiety disorder that caused clinically significant distress or impairment in social, occupational, or other important areas of functioning predominate but do not meet the full criteria for any of the disorders of the anxiety disorders diagnostic class.      DSM5 Diagnoses: (Sustained by DSM5 Criteria Listed Above)  Diagnoses: 300.00 (F41.9) Unspecified Anxiety Disorder  Psychosocial & Contextual Factors:   WHODAS 2.0 (12 item):   WHODAS 2.0 Total Score 3/22/2022   Total Score 20   Total Score MyChart 20     Intervention:   Completed through review of safety issues and safety interventions and Educated on treatment planning and started identifying goals and interventions for treatment plan  Collateral Reports Completed:  Not Applicable      PLAN: (Homework, other):  1. Provider will continue Diagnostic Assessment.  Patient was given the following to do until next session:  Intake paperwork    2. Provider recommended the following referrals: none at this time.      3.  Suicide Risk and Safety Concerns were assessed for Mary Dixon.    Patient meets the following risk assessment and triage: Patient denied any current/recent/lifetime history of suicidal ideation and/or behaviors.  No safety plan indicated at this time.       Kati Mcgill, [facility]  March 22, 2022

## 2022-04-05 ENCOUNTER — VIRTUAL VISIT (OUTPATIENT)
Dept: PSYCHOLOGY | Facility: CLINIC | Age: 42
End: 2022-04-05
Payer: COMMERCIAL

## 2022-04-05 DIAGNOSIS — F41.9 ANXIETY: Primary | ICD-10-CM

## 2022-04-05 PROCEDURE — 90791 PSYCH DIAGNOSTIC EVALUATION: CPT | Mod: 95 | Performed by: COUNSELOR

## 2022-04-05 NOTE — PROGRESS NOTES
"[facility] Counseling  Provider Name:  Kati Mcgill MA, [facility]          PATIENT'S NAME: Mary Dixon  PREFERRED NAME: Otto  PRONOUNS:     she/her   MRN: [id number]  : 1980  ADDRESS: [address] [place] [id number]  ACCT. NUMBER:  [id number]  DATE OF SERVICE: 22  START TIME: 11am  END TIME: 12  PREFERRED PHONE: [phone]  May we leave a program related message: Yes  SERVICE MODALITY:  Video Visit:      Provider verified identity through the following two step process.  Patient provided:  Patient     Telemedicine Visit: The patient's condition can be safely assessed and treated via synchronous audio and visual telemedicine encounter.      Reason for Telemedicine Visit: Services only offered telehealth    Originating Site (Patient Location): Patient's other - vehicle parked in [place]    Distant Site (Provider Location): Provider Remote Setting- Home Office    Consent:  The patient/guardian has verbally consented to: the potential risks and benefits of telemedicine (video visit) versus in person care; bill my insurance or make self-payment for services provided; and responsibility for payment of non-covered services.     Patient would like the video invitation sent by:  My Chart    Mode of Communication:  Video Conference via [employer]    As the provider I attest to compliance with applicable laws and regulations related to telemedicine.    UNIVERSAL ADULT Mental Health DIAGNOSTIC ASSESSMENT    Identifying Information:  Patient is a 42 year old,  .  The pronoun use throughout this assessment reflects the patient's chosen pronoun.  Patient was referred for an assessment by primary care clinic.  Patient attended the session alone.    Chief Complaint:   The reason for seeking services at this time is: \"Stress and anxiety\".  The problem(s) began 10/22/21.    Patient has attempted to resolve these concerns in the past through medication, therapist, increasing engagement in " "hobbies ([employer]), FMLA through work, setting more boundaries with others, walking regularly, mental health apps, inspirational quotes.     Social/Family History:  Patient reported they grew up in [place]  .  They were raised by biological parents  .  Parents were always together. Parents live in different states half of the year. Patient reported that their childhood was \"raised in a good family, second of 4 (twins). Also was very close to a neighbor family growing up. Went camping, no abuse.\" Patient described their current relationships with family of origin as: very close with twin sister, no contact with little brother, some conflict with other brother. Client reports a positive relationship with parents.    The patient describes their cultural background as .  Cultural influences and impact on patient's life structure, values, norms, and healthcare: None identified.    These factors will be addressed in the Preliminary Treatment plan. Patient identified their preferred language to be English. Patient reported they does not need the assistance of an  or other support involved in therapy.     Patient reported had no significant delays in developmental tasks. Client states she has had IQ testing that indicates she has above average intellegence. Patient's highest education level was college graduate  .  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 Deficit in reading and comprehension. Modifications will not be used to assist communication in therapy.  Patient reports they are  able to understand written materials.    Patient reported the following relationship history-.  Patient's current relationship status is  for 14 years (Hilnusrat), known each other for 20.  Client identifies as seperated and is wanting a divorce. States her  would like to stay together. " "States reasoning is that  has moved his mother into the home, who client does not get along with, and feels his mother's needs are prioritized over others. Patient is currently living with father because of this. Patient identified their sexual orientation as heterosexual.  Patient reported having   child(clovis). Patient identified mother; father; siblings; friends; spouse as part of their support system.  Patient identified the quality of these relationships as good,  .      Patient's current living/housing situation involves other.  The immediate members of family and household include Arnulfo Winter,Father  and they report that housing is stable.    Patient is currently unemployed and has been for the past 6 months.  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  is assisting with some bills. 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 Reports having had taken FMLA in October 2021 to help care for her sister and attend to her own mental health. Describes \"being harassed my coworkers and bullied by management.\" Reports not getting COVID vaccine and was receiving \"daily emails from management that she could be fired if didn't get this\". When client returned to work after leave, states she was very anxious and would experience panic attacks. Notes even now if she has to go onto the property, she experiences high anxiety.     Patient reports their finances are obtained through other. Patient does not identify finances as a current stressor.        Patient reported that they have not been involved with the legal system.   . Patient does not report being under probation/ parole/ jurisdiction. They are not under any current court jurisdiction. .    Patient's Strengths and Limitations:  Patient identified the following strengths or resources that will help them succeed in treatment: commitment to health and well being, exercise routine and sense of humor. Things that " may interfere with the patient's success in treatment include: lack of social support, physical health concerns and housing instability.     Assessments:  The following assessments were completed by patient for this visit:  PHQ9:   PHQ-9 SCORE 6/15/2021   PHQ-9 Total Score 0     GAD7:   DONELL-7 SCORE 3/17/2020 6/15/2021 11/19/2021   Total Score 7 (mild anxiety) - 6 (mild anxiety)   Total Score 7 10 6     CAGE-AID:   CAGE-AID Total Score 3/22/2022   Total Score 0   Total Score MyChart 0 (A total score of 2 or greater is considered clinically significant)     PROMIS 10-Global Health (all questions and answers displayed):   PROMIS 10 3/22/2022   In general, would you say your health is: Good   In general, would you say your quality of life is: Good   In general, how would you rate your physical health? Good   In general, how would you rate your mental health, including your mood and your ability to think? Fair   In general, how would you rate your satisfaction with your social activities and relationships? Fair   In general, please rate how well you carry out your usual social activities and roles Fair   To what extent are you able to carry out your everyday physical activities such as walking, climbing stairs, carrying groceries, or moving a chair? Completely   How often have you been bothered by emotional problems such as feeling anxious, depressed or irritable? Often   How would you rate your fatigue on average? Mild   How would you rate your pain on average?   0 = No Pain  to  10 = Worst Imaginable Pain 1   In general, would you say your health is: 3   In general, would you say your quality of life is: 3   In general, how would you rate your physical health? 3   In general, how would you rate your mental health, including your mood and your ability to think? 2   In general, how would you rate your satisfaction with your social activities and relationships? 2   In general, please rate how well you carry out your usual  "social activities and roles. (This includes activities at home, at work and in your community, and responsibilities as a parent, child, spouse, employee, friend, etc.) 2   To what extent are you able to carry out your everyday physical activities such as walking, climbing stairs, carrying groceries, or moving a chair? 5   In the past 7 days, how often have you been bothered by emotional problems such as feeling anxious, depressed, or irritable? 4   In the past 7 days, how would you rate your fatigue on average? 2   In the past 7 days, how would you rate your pain on average, where 0 means no pain, and 10 means worst imaginable pain? 1   Global Mental Health Score 9   Global Physical Health Score 16   PROMIS TOTAL - SUBSCORES 25   Some recent data might be hidden     [place] Suicide Severity Rating Scale (Lifetime/Recent)  [place] Suicide Severity Rating (Lifetime/Recent) 1/15/2019   Q1 Wished to be Dead (Past Month) no   Q2 Suicidal Thoughts (Past Month) no   Q6 Suicide Behavior (Lifetime) no       Personal and Family Medical History:  Patient does not report a family history of mental health concerns.  Patient reports family history includes Alzheimer Disease in her maternal grandmother; Breast Cancer in her maternal grandmother; Coronary Artery Disease (age of onset: 55) in her maternal grandfather; Diabetes in her maternal grandmother; Heart Disease in her maternal grandfather; Hypertension in her father and maternal grandfather..     Patient does report Mental Health Diagnosis and/or Treatment.  Patient Patient reported the following previous diagnoses which include(s): ADHD, Depression and \"a mood disorder\".  Patient reported symptoms began October 2021.   Patient has received mental health services in the past: therapy with external provider.  Psychiatric Hospitalizations: None.  Patient denies a history of civil commitment.  Patient is not receiving other mental health services.  These include none.     "     Patient has had a physical exam to rule out medical causes for current symptoms.  Date of last physical exam was within the past year. Client was encouraged to follow up with PCP if symptoms were to develop. The patient has a [place] Primary Care Provider, who is named Riri Meyers..  Patient reports the following current medical concerns: seasonal allergies and chronic bronchitis.  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 Currently apart of the [employer] suit against [employer].  Patient denies any issues with pain..   There are significant appetite / nutritional concerns / weight changes- overweight.   Patient does not report a history of head injury / trauma / cognitive impairment.      Patient reports current meds as:   No outpatient medications have been marked as taking for the 4/5/22 encounter (Appointment) with Kati Mcgill [facility].       Medication Adherence:  Patient reports taking.    Patient Allergies:  No Known Allergies    Medical History:    Past Medical History:   Diagnosis Date     Benign intracranial hypertension      Diabetes mellitus, type 2 (H)      Hypertension      Mononucleosis      Ovarian cancer (H)     borderline ovarian cancer     Pneumonia      Post-menopausal bleeding          Current Mental Status Exam:   Appearance:  Appropriate    Eye Contact:  Good   Psychomotor:  Normal       Gait / station:  no problem  Attitude / Demeanor: Cooperative   Speech      Rate / Production: Normal/ Responsive      Volume:  Normal  volume      Language:  intact  Mood:   Anxious   Affect:   Appropriate    Thought Content: Clear   Thought Process: Coherent  Logical       Associations: No loosening of associations  Insight:   Fair  and Poor   Judgment:  Intact   Orientation:  All  Attention/concentration: Fair      Substance Use:  Patient did not  "report a family history of substance use concerns; see medical history section for details.  Patient has not received chemical dependency treatment in the past.  Patient has not ever been to detox.      Patient is not currently receiving any chemical dependency treatment.           Substance History of use Age of first use Date of last use     Pattern and duration of use (include amounts and frequency)   Alcohol never used          Cannabis   never used        Amphetamines   never used        Cocaine/crack    never used          Hallucinogens never used            Inhalants never used            Heroin never used            Other Opiates never used        Benzodiazepine   never used        Barbiturates never used        Over the counter meds never used        Caffeine currently use Really      Nicotine  currently use 20 03/22/22 Smoke around 1-1.5 packs a day   Other substances not listed above:  Identify:  never used          Patient reported the following problems as a result of their substance use: family problems; occupational/vocational problems; relationship problems; other.    Substance Use: No symptoms    Based on the negative CAGE score and clinical interview there  are not indications of drug or alcohol abuse.      Significant Losses / Trauma / Abuse / Neglect Issues:   Patient did not serve in the .  There are indications or report of significant loss, trauma, abuse or neglect issues related to: major medical problems - ovarian cancer. (2014) Client reports during recovery,  brought his mother to live with them to help care for her. Client reports this was very traumatic for her due to behaviors that MIL engaged in.  -\"Neglect in romantic relationship by .\"    Concerns for possible neglect are not present.     Safety Assessment:   Patient denies current homicidal ideation and behaviors.  Patient denies current self-injurious ideation and behaviors.    Patient denied risk behaviors " associated with substance use.  Patient denies any high risk behaviors associated with mental health symptoms.  Patient reports the following current concerns for their personal safety: None.  Patient reports there are firearms in the house.     yes, they are secured.     History of Safety Concerns:  Patient denied a history of homicidal ideation.     Patient denied a history of personal safety concerns.    Patient denied a history of assaultive behaviors.    Patient denied a history of sexual assault behaviors.     Patient denied a history of risk behaviors associated with substance use.  Patient denies any history of high risk behaviors associated with mental health symptoms.  Patient reports the following protective factors: sense of personal control or determination    Risk Plan:  See Recommendations for Safety and Risk Management Plan    Review of Symptoms per patient report:  Depression: Change in sleep, Lack of interest, Change in energy level, Ruminations, Irritability, Frequent crying and Anger outbursts   Marlena:  No Symptoms  Psychosis: No Symptoms  Anxiety: Excessive worry, Nervousness, Sleep disturbance, Ruminations, Irritability and Anger outbursts, avoidance. Triggers: Confrontation, relationship conflict.  Panic:  Palpitations, Sense of impending doom and tunnel vision/cant hear, crying. Experienced panic attacks related to going to work, most recently a couple weeks ago. Unclear of the triggers, likely relationship conflict.   Post Traumatic Stress Disorder:  No Symptoms   Eating Disorder: No Symptoms - forgets to eat, not intentional. Feels its related to ADHD and being distracted.   ADD / ADHD:   Reports being assessed and diagnosed with ADHD inattentive type.  Conduct Disorder: No symptoms  Autism Spectrum Disorder: No symptoms  Obsessive Compulsive Disorder: No Symptoms   Personality Disorder: cant deal with narcissist, feel attacked by others, feels unrealistic expectations are held of her by  "others. \"Escapism\" -- avoid things, hold a grudge, feels she is often misunderstood based on her deep voice and how blunt she is. States she has burned a lot of bridges in the last 6 months. 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 If I fight, gloves are on. Easier to walk away versus tell them how I really feel. Im not a daily friend, here in your life for a while, I leave.\"     Diagnostic Criteria:   Unspecified Anxiety Disorder , Symptoms characteristic of an anxiety disorder that caused clinically significant distress or impairment in social, occupational, or other important areas of functioning predominate but do not meet the full criteria for any of the disorders of the anxiety disorders diagnostic class.    Functional Status:  Patient reports the following functional impairments:  home life with family, relationship(s), social interactions and work / vocational responsibilities.     Nonprogrammatic care:  Patient is requesting basic services to address current mental health concerns.    Clinical Summary:  1. Reason for assessment: seeking individual therapy services  .  2. Psychosocial, Cultural and Contextual Factors: limited social supports, unemployed, pervasive interpersonal conflict .  3. Principal DSM5 Diagnoses  (Sustained by DSM5 Criteria Listed Above):   300.00 (F41.9) Unspecified Anxiety Disorder.  4. Other Diagnoses that is relevant to services:   Attention-Deficit/Hyperactivity Disorder  314.00 (F90.0) Predominantly inattentive presentation.  5. Provisional Diagnosis: Rule Out Borderline Personality Disorder  6. Prognosis: Return to Normal Functioning and Expect Improvement.  7. Likely consequences of symptoms if not treated: further decompensation, inability to return to work.  8. Client strengths include:  educated, open to suggestions / feedback, willing to ask questions and work history .     Recommendations:     1. Plan for Safety and Risk " Management:   Recommended that patient call 911 or go to the local ED should there be a change in any of these risk factors..          Report to child / adult protection services was NA.     2. Patient's identified mental health concerns with a cultural influence will be addressed by - none identified.     3. Initial Treatment will focus on:    Anxiety -    Adjustment Difficulties related to: loss of signigicant relationship and unemployment  Relational Problems related to: Conflict or difficulties with partner/spouse.      4. Resources/Service Plan:    services are not indicated.   Modifications to assist communication are not indicated.   Additional disability accommodations are not indicated.      5. Collaboration:   Collaboration / coordination of treatment will be initiated with the following  support professionals: primary care physician.      6.  Referrals:   The following referral(s) will be initiated: Dialectical Behavior Therapy. Next Scheduled Appointment:  .     A Release of Information has been obtained for the following: n/a.    7. MARCELA:    MARCELA:  Discussed the general effects of drugs and alcohol on health and well-being. Provider gave patient printed information about the effects of chemical use on their health and well being. Recommendations:    .     8. Records:   These were reviewed at time of assessment.   Information in this assessment was obtained from the medical record and  provided by patient who is a fair historian.    Patient will have open access to their mental health medical record.        Provider Name/ Credentials:  Kati Mcgill MA, [facility]   April 5, 2022

## 2022-04-13 ENCOUNTER — E-VISIT (OUTPATIENT)
Dept: FAMILY MEDICINE | Facility: CLINIC | Age: 42
End: 2022-04-13

## 2022-04-13 ENCOUNTER — HOSPITAL ENCOUNTER (EMERGENCY)
Facility: CLINIC | Age: 42
Discharge: HOME | End: 2022-04-13
Attending: EMERGENCY MEDICINE | Admitting: EMERGENCY MEDICINE
Payer: COMMERCIAL

## 2022-04-13 ENCOUNTER — MYC MEDICAL ADVICE (OUTPATIENT)
Dept: FAMILY MEDICINE | Facility: CLINIC | Age: 42
End: 2022-04-13
Payer: COMMERCIAL

## 2022-04-13 VITALS
DIASTOLIC BLOOD PRESSURE: 136 MMHG | OXYGEN SATURATION: 96 % | BODY MASS INDEX: 48.58 KG/M2 | WEIGHT: 293 LBS | SYSTOLIC BLOOD PRESSURE: 172 MMHG | TEMPERATURE: 97.4 F | HEART RATE: 96 BPM | RESPIRATION RATE: 20 BRPM

## 2022-04-13 DIAGNOSIS — L02.92 BOIL: Primary | ICD-10-CM

## 2022-04-13 DIAGNOSIS — L02.211 ABSCESS OF ABDOMINAL WALL: ICD-10-CM

## 2022-04-13 LAB — GLUCOSE BLDC GLUCOMTR-MCNC: 109 MG/DL (ref 70–99)

## 2022-04-13 PROCEDURE — 99284 EMERGENCY DEPT VISIT MOD MDM: CPT | Mod: 25 | Performed by: EMERGENCY MEDICINE

## 2022-04-13 PROCEDURE — 99207 PR NON-BILLABLE SERV PER CHARTING: CPT | Performed by: PHYSICIAN ASSISTANT

## 2022-04-13 PROCEDURE — 10061 I&D ABSCESS COMP/MULTIPLE: CPT | Performed by: EMERGENCY MEDICINE

## 2022-04-13 RX ORDER — SULFAMETHOXAZOLE/TRIMETHOPRIM 800-160 MG
1 TABLET ORAL 2 TIMES DAILY
Qty: 14 TABLET | Refills: 0 | Status: SHIPPED | OUTPATIENT
Start: 2022-04-13 | End: 2022-04-20

## 2022-04-13 NOTE — ED PROVIDER NOTES
History     Chief Complaint   Patient presents with     Cyst     abdomen     HPI  Mary Dixon is a 42 year old female who presents for skin lesion.  Localized to abdominal wall.  Developed over the past two weeks, she tried to drain it herself but it has only gotten worse in the past several days with increased redness spreading across her abdominal wall.  Pain is moderate in severity,, hurts to touch.  She does not report fever.  She does have a history of past skin infection.  Does not report headache, chest pain, shortness of breath, nausea, vomiting, diarrhea, or weakness.      Allergies:  No Known Allergies    Problem List:    Patient Active Problem List    Diagnosis Date Noted     Diabetes mellitus, type 2 (H) 01/23/2019     Priority: Medium     Abdominal pain 08/12/2018     Priority: Medium     Atypical chest pain 01/16/2018     Priority: Medium     Being attributed to anxiety       Hirsutism 01/16/2018     Priority: Medium     Morbid obesity (H) 01/03/2018     Priority: Medium     Prediabetes 02/03/2013     Priority: Medium     PMB (postmenopausal bleeding) 12/26/2012     Priority: Medium     HTN (hypertension) 05/18/2012     Priority: Medium     Personal history of ovarian cancer 05/16/2012     Priority: Medium     Last visit 12/26/12 Dr. Fowler  1.) Patient is aware that she is due her EMBx, but declines general exam today due to flu like symptoms. We will therefore wait to do this at her next surveillance visit.    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 3/08  Dr. Petra Kaminski is gyn/onc - at [facility]  Did not remove uterus    Per pt report, she has \"lifting restrictions\" and hasn't been able to go back to work. Says her ob/gyn has been filling out the paperwork. She says she has seen PT.    Transdermal estrogen 100mcg patch - saw repro med - Dr. Faria - see scanned documents    Most recent visit with Dr. Fowler 11/09 - pt is off hormones and is to do withdrawal bleeds 4x/year. Has progesterone for this.   Sent to chiropractor for therapy        Benign intracranial hypertension 12/05/2006     Priority: Medium     Diagnosed in 2005  Not on diamox.  Has seen neuro          Past Medical History:    Past Medical History:   Diagnosis Date     Benign intracranial hypertension      Diabetes mellitus, type 2 (H)      Hypertension      Mononucleosis      Ovarian cancer (H)      Pneumonia      Post-menopausal bleeding        Past Surgical History:    Past Surgical History:   Procedure Laterality Date     APPENDECTOMY  3/6/08     GYN SURGERY  03/06/08    Ovarian cancer     LAPAROSCOPIC CHOLECYSTECTOMY WITH CHOLANGIOGRAMS N/A 8/1/2018    Procedure: LAPAROSCOPIC CHOLECYSTECTOMY WITH CHOLANGIOGRAMS;  Laparoscopic Cholecystectomy;  Surgeon: Gilbert Garcia MD;  Location: [facility] OR     SURGICAL HISTORY OF -   3/6/08    EUA, 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        Family History:    Family History   Problem Relation Age of Onset     Hypertension Father      Diabetes Maternal Grandmother      Alzheimer Disease Maternal Grandmother      Breast Cancer Maternal Grandmother      Heart Disease Maternal Grandfather      Hypertension Maternal Grandfather      Coronary Artery Disease Maternal Grandfather 55        bypass       Social History:  Marital Status:   [2]  Social History     Tobacco Use     Smoking status: Current Every Day " Smoker     Packs/day: 0.50     Years: 2.00     Pack years: 1.00     Types: Cigarettes     Smokeless tobacco: Never Used   Substance Use Topics     Alcohol use: No     Drug use: No        Medications:    sulfamethoxazole-trimethoprim (BACTRIM DS) 800-160 MG tablet  Acetaminophen (TYLENOL PO)  acetaZOLAMIDE (DIAMOX SEQUEL) 500 MG 12 hr capsule  alcohol swab prep pads  blood glucose (NO BRAND SPECIFIED) test strip  blood glucose calibration (NO BRAND SPECIFIED) solution  blood glucose monitoring (NO BRAND SPECIFIED) meter device kit  Continuous Blood Gluc  (FREESTYLE MIMA READER) BELTRAN  continuous blood glucose monitoring (FREESTYLE MIMA) sensor  docusate sodium (COLACE) 100 MG tablet  escitalopram (LEXAPRO) 10 MG tablet  hydrOXYzine (ATARAX) 25 MG tablet  lisinopril (ZESTRIL) 10 MG tablet  Melatonin 5 MG/ML LIQD  metoprolol succinate ER (TOPROL-XL) 25 MG 24 hr tablet  pantoprazole (PROTONIX) 40 MG EC tablet  propranolol (INDERAL) 20 MG tablet  Semaglutide,0.25 or 0.5MG/DOS, 2 MG/1.5ML SOPN  thin (NO BRAND SPECIFIED) lancets          Review of Systems  A 4 point review of systems was performed. All pertinent positives and negatives were listed in the HPI and rest of ROS were otherwise negative.    Physical Exam   BP: (!) 164/116  Pulse: 96  Temp: 97.4  F (36.3  C)  Resp: 20  Weight: 149.2 kg (329 lb)  SpO2: 99 %      Physical Exam  Constitutional:       General: She is not in acute distress.     Appearance: She is well-developed. She is not diaphoretic.   HENT:      Head: Normocephalic and atraumatic.   Eyes:      General: No scleral icterus.  Musculoskeletal:      Cervical back: Normal range of motion and neck supple.   Skin:     General: Skin is warm and dry.      Coloration: Skin is not pale.      Findings: No erythema or rash.      Comments: Erythema with tenderness and fluctuance over the abdominal wall with excess warmth   Neurological:      Mental Status: She is alert and oriented to person, place, and  time.         ED Course                 [facility]    PROCEDURE: -Incision/Drainage    Date/Time: 4/13/2022 2:02 PM  Performed by: Janusz Griffith MD  Authorized by: Janusz Griffith MD     Risks, benefits and alternatives discussed.      LOCATION:      Type:  Abscess    Location:  Trunk    Trunk location:  Abdomen    PRE-PROCEDURE DETAILS:     Skin preparation:  Chloraprep    PROCEDURE TYPE:     Complexity:  Complex    ANESTHESIA (see MAR for exact dosages):     Anesthesia method:  Local infiltration    Local anesthetic:  Bupivacaine 0.5% w/o epi    PROCEDURE DETAILS:     Needle aspiration: no      Incision types:  Single straight    Incision depth:  Dermal    Scalpel blade:  11    Wound management:  Probed and deloculated    Drainage:  Bloody    Drainage amount:  Moderate    Wound treatment:  Wound left open    Packing material: 1 in iodoform gauze.    PROCEDURE    Patient Tolerance:  Patient tolerated the procedure well with no immediate complications                Critical Care time:  none               Results for orders placed or performed during the hospital encounter of 04/13/22 (from the past 24 hour(s))   Glucose by meter   Result Value Ref Range    GLUCOSE BY METER POCT 109 (H) 70 - 99 mg/dL       Medications - No data to display    Assessments & Plan (with Medical Decision Making)   Differential includes cellulitis, abscess.  She remained stable.  She was given pain medications with improvement in symptoms. An I&D was performed, see associated procedure note.  She will be prescribed abx.  Will follow up in the ED as needed or return sooner for worsening. The patient was in agreement with this plan.     I have reviewed the nursing notes.    I have reviewed the findings, diagnosis, plan and need for follow up with the patient.       New Prescriptions    SULFAMETHOXAZOLE-TRIMETHOPRIM (BACTRIM DS) 800-160 MG TABLET    Take 1 tablet by mouth 2 times daily for 7 days        Final diagnoses:   Abscess of abdominal wall       4/13/2022   [facility] EMERGENCY DEPT     Janusz Griffith MD  04/13/22 [id number]

## 2022-04-13 NOTE — ED NOTES
Cyst on abdomen for 2 weeks.  Patient lanced cyst herself 2 weeks ago and now redness is spreading.  Patient stated that her blood pressure is also high, however she has not been taking her prescribed Lisinopril.    Patient diabetic and wanted to make sure blood sugar wasn't high as well.   POC glucometer check 109.

## 2022-04-13 NOTE — DISCHARGE INSTRUCTIONS
The packing material will likely fall out on its own, if it has not fallen out in 2 days you may remove it at home or return to have it removed in clinic/ED.    Start performing warm wet soaks in a bath or Sitz bath several times per day over the wound.  Cover the wound with dry gauze when not soaking. Do this until the wound heals.    Please return to the ED immediately if you notice:  Fever or chills   Reaccumulation of pus in the area   Increased pain or redness   Red streaks   Increased swelling

## 2022-04-30 ENCOUNTER — HEALTH MAINTENANCE LETTER (OUTPATIENT)
Age: 42
End: 2022-04-30

## 2022-05-03 ENCOUNTER — VIRTUAL VISIT (OUTPATIENT)
Dept: PSYCHOLOGY | Facility: CLINIC | Age: 42
End: 2022-05-03
Payer: COMMERCIAL

## 2022-05-03 DIAGNOSIS — F41.9 ANXIETY: ICD-10-CM

## 2022-05-03 DIAGNOSIS — F90.9 ATTENTION DEFICIT HYPERACTIVITY DISORDER (ADHD), UNSPECIFIED ADHD TYPE: Primary | ICD-10-CM

## 2022-05-03 PROCEDURE — 90837 PSYTX W PT 60 MINUTES: CPT | Mod: 95 | Performed by: COUNSELOR

## 2022-05-03 NOTE — PROGRESS NOTES
[facility]                                     Progress Note    Patient Name: Mary Dixon  Date: 5/3/22         Service Type: Consult Note      Session Start Time: 1:30 pm  Session End Time: 2:25     Session Length: 55 m    Session #: 2    Attendees: Client attended alone    Service Modality:  Video Visit:      Provider verified identity through the following two step process.  Patient provided:  Patient     Telemedicine Visit: The patient's condition can be safely assessed and treated via synchronous audio and visual telemedicine encounter.      Reason for Telemedicine Visit: Services only offered telehealth    Originating Site (Patient Location): Patient's other - parked car    Distant Site (Provider Location): Provider Remote Setting- Home Office    Consent:  The patient/guardian has verbally consented to: the potential risks and benefits of telemedicine (video visit) versus in person care; bill my insurance or make self-payment for services provided; and responsibility for payment of non-covered services.     Patient would like the video invitation sent by:  My Chart    Mode of Communication:  Video Conference via Amwell    As the provider I attest to compliance with applicable laws and regulations related to telemedicine.    DATA  Interactive Complexity: No  Crisis: No        Progress Since Last Session (Related to Symptoms / Goals / Homework):   Symptoms: No change      Homework: n/a      Episode of Care Goals: No improvement - PREPARATION (Decided to change - considering how); Intervened by negotiating a change plan and determining options / strategies for behavior change, identifying triggers, exploring social supports, and working towards setting a date to begin behavior change     Current / Ongoing Stressors and Concerns:     Client reports close friend recently disclosed romantic feelings for client and has stopped talking to her/blocked her phone number. Expresses a lot of  pain and confusion around this. Is also continuing to struggle with  and choosing if she wants to follow through with divorce.     Treatment Objective(s) Addressed in This Session:   Treatment goals developed in today's session.     Intervention:   CBT: Assisted with treatment planning and goal development    Assessments completed prior to visit:  The following assessments were completed by patient for this visit:  PHQ9:   PHQ-9 SCORE 6/15/2021   PHQ-9 Total Score 0     GAD7:   DONELL-7 SCORE 3/17/2020 6/15/2021 11/19/2021   Total Score 7 (mild anxiety) - 6 (mild anxiety)   Total Score 7 10 6     CAGE-AID:   CAGE-AID Total Score 3/22/2022   Total Score 0   Total Score MyChart 0 (A total score of 2 or greater is considered clinically significant)     PROMIS 10-Global Health (only subscores and total score):   PROMIS-10 Scores Only 3/22/2022   Global Mental Health Score 9   Global Physical Health Score 16   PROMIS TOTAL - SUBSCORES 25     Protection Suicide Severity Rating Scale (Lifetime/Recent)  Protection Suicide Severity Rating (Lifetime/Recent) 1/15/2019   Q1 Wished to be Dead (Past Month) no   Q2 Suicidal Thoughts (Past Month) no   Q6 Suicide Behavior (Lifetime) no         ASSESSMENT: Current Emotional / Mental Status (status of significant symptoms):   Risk status (Self / Other harm or suicidal ideation)   Patient denies current fears or concerns for personal safety.   Patient denies current or recent suicidal ideation or behaviors.   Patient denies current or recent homicidal ideation or behaviors.   Patient denies current or recent self injurious behavior or ideation.   Patient denies other safety concerns.   Patient reports there has been no change in risk factors since their last session.     Patient reports there has been no change in protective factors since their last session.     Recommended that patient call 911 or go to the local ED should there be a change in any of these risk  factors.     Appearance:   Appropriate    Eye Contact:   Fair    Psychomotor Behavior: Normal    Attitude:   Cooperative    Orientation:   All   Speech    Rate / Production: Normal     Volume:  Normal    Mood:    Anxious  Sad    Affect:    Appropriate  Tearful   Thought Content:  Clear    Thought Form:  Coherent  Logical    Insight:    Fair      Medication Review:   No changes to current psychiatric medication(s)     Medication Compliance:   NA       Changes in Health Issues:   None reported     Chemical Use Review:   Substance Use: Chemical use reviewed, no active concerns identified      Tobacco Use: No change in amount of tobacco use since last session.  Patient declined discussion at this time    Diagnosis:  1. Attention deficit hyperactivity disorder (ADHD), unspecified ADHD type    2. Anxiety          Collateral Reports Completed:   Not Applicable    PLAN: (Patient Tasks / Therapist Tasks / Other)  HOMEWORK: Review DBT referrals/resources        Kati Mcgill [facility]                                                         ______________________________________________________________________    Individual Treatment Plan    Patient's Name: Mary Dixon  YOB: 1980    Date of Creation: 5/3/22  Date Treatment Plan Last Reviewed/Revised: 5/3/22    DSM5 Diagnoses: Attention-Deficit/Hyperactivity Disorder  314.01 (F90.9) Unspecified Attention -Deficit / Hyperactivity Disorder or 300.00 (F41.9) Unspecified Anxiety Disorder  Psychosocial / Contextual Factors:   PROMIS (reviewed every 90 days):     Referral / Collaboration:  The following referral(s) was/were discussed but patient declines follow up at this time. DBT Group Therapy.    Anticipated number of session for this episode of care: 12+  Anticipation frequency of session: Every other week  Anticipated Duration of each session: 38-52 minutes  Treatment plan will be reviewed in 90 days or when goals have been changed.       MeasurableTreatment  Goal(s) related to diagnosis / functional impairment(s)  Goal 1: Patient will reduce anxiety levels and panic attacks as evidence by DONELL-7 scores and patient report.       Objective #A (Patient Action)    Patient will identify at least 3 triggers for anxiety.  Status: New - Date: 5/3/22     Intervention(s)  Therapist will teach emotional recognition/identification. DBT Mindfulness skills.    Objective #B  Patient will learn and identify cognitive distortions that impact anxiety levels.  Status: New - Date: 5/3/22     Intervention(s)  Therapist will assign homework    teach identification and challenge of cognitive distortions.    Objective #C  Patient will use relaxation strategies 1+ times per day to reduce the physical symptoms of anxiety.  Status: New - Date: 5/3/22     Intervention(s)  Therapist will assign homework    teach DBT Mindfulness, Relaxation skills.      Goal 2: Patient will increase understanding and engagement in healthy relationships.     Objective #A (Patient Action)    Status: New - Date: 5/3/22     Patient will learn and apply Interpersonal Effectiveness skills at least once daily to improve communication skills.     Intervention(s)  Therapist will assign homework    role-play effective communication skills  teach how to choose an intensity for asking or saying 'no'.  Interpersonal Effectiveness skills.       Patient has reviewed and agreed to the above plan.      Kati Mcgill [facility]  May 3, 2022

## 2022-05-16 ENCOUNTER — VIRTUAL VISIT (OUTPATIENT)
Dept: PSYCHOLOGY | Facility: CLINIC | Age: 42
End: 2022-05-16
Payer: COMMERCIAL

## 2022-05-16 DIAGNOSIS — F90.9 ATTENTION DEFICIT HYPERACTIVITY DISORDER (ADHD), UNSPECIFIED ADHD TYPE: Primary | ICD-10-CM

## 2022-05-16 DIAGNOSIS — F41.9 ANXIETY: ICD-10-CM

## 2022-05-16 PROCEDURE — 90834 PSYTX W PT 45 MINUTES: CPT | Mod: 95 | Performed by: COUNSELOR

## 2022-05-16 NOTE — PROGRESS NOTES
[facility]                                     Progress Note    Patient Name: Mary Dixon  Date: 22         Service Type: Individual      Session Start Time: 9:00 am  Session End Time: 9:45     Session Length: 45 m    Session #: 3    Attendees: Client attended alone    Service Modality:  Video Visit:      Provider verified identity through the following two step process.  Patient provided:  Patient     Telemedicine Visit: The patient's condition can be safely assessed and treated via synchronous audio and visual telemedicine encounter.      Reason for Telemedicine Visit: Services only offered telehealth    Originating Site (Patient Location): Patient's other - parked car    Distant Site (Provider Location): Provider Remote Setting- Home Office    Consent:  The patient/guardian has verbally consented to: the potential risks and benefits of telemedicine (video visit) versus in person care; bill my insurance or make self-payment for services provided; and responsibility for payment of non-covered services.     Patient would like the video invitation sent by:  My Chart    Mode of Communication:  Video Conference via Amwell    As the provider I attest to compliance with applicable laws and regulations related to telemedicine.    DATA  Interactive Complexity: No  Crisis: No        Progress Since Last Session (Related to Symptoms / Goals / Homework):   Symptoms: No change      Homework: Achieved / completed to satisfaction      Episode of Care Goals: No improvement - PREPARATION (Decided to change - considering how); Intervened by negotiating a change plan and determining options / strategies for behavior change, identifying triggers, exploring social supports, and working towards setting a date to begin behavior change     Current / Ongoing Stressors and Concerns:   Client reports ongoing feelings of wanting a divorce. Mother is moving home in the next couple weeks which she is concerned  "around. \"Snipping\" on others more often. Expresses difficulty identifying specific focus of session.       Treatment Objective(s) Addressed in This Session:    Patient will identify at least 3 triggers for anxiety.     Intervention:   DBT: Coached on Mindfulness skills and diary card completion to increase insight levels and pattern recognition.     Assessments completed prior to visit:  The following assessments were completed by patient for this visit:  PHQ9:   PHQ-9 SCORE 6/15/2021   PHQ-9 Total Score 0     GAD7:   DONELL-7 SCORE 3/17/2020 6/15/2021 11/19/2021   Total Score 7 (mild anxiety) - 6 (mild anxiety)   Total Score 7 10 6     CAGE-AID:   CAGE-AID Total Score 3/22/2022   Total Score 0   Total Score MyChart 0 (A total score of 2 or greater is considered clinically significant)     PROMIS 10-Global Health (only subscores and total score):   PROMIS-10 Scores Only 3/22/2022   Global Mental Health Score 9   Global Physical Health Score 16   PROMIS TOTAL - SUBSCORES 25     Hughes Suicide Severity Rating Scale (Lifetime/Recent)  Hughes Suicide Severity Rating (Lifetime/Recent) 1/15/2019   Q1 Wished to be Dead (Past Month) no   Q2 Suicidal Thoughts (Past Month) no   Q6 Suicide Behavior (Lifetime) no         ASSESSMENT: Current Emotional / Mental Status (status of significant symptoms):   Risk status (Self / Other harm or suicidal ideation)   Patient denies current fears or concerns for personal safety.   Patient denies current or recent suicidal ideation or behaviors.   Patient denies current or recent homicidal ideation or behaviors.   Patient denies current or recent self injurious behavior or ideation.   Patient denies other safety concerns.   Patient reports there has been no change in risk factors since their last session.     Patient reports there has been no change in protective factors since their last session.     Recommended that patient call 911 or go to the local ED should there be a change in any of " these risk factors.     Appearance:   Appropriate    Eye Contact:   Fair    Psychomotor Behavior: Normal    Attitude:   Cooperative    Orientation:   All   Speech    Rate / Production: Normal     Volume:  Normal    Mood:    Anxious  Sad    Affect:    Appropriate  Tearful   Thought Content:  Clear    Thought Form:  Coherent  Logical    Insight:    Fair      Medication Review:   No changes to current psychiatric medication(s)     Medication Compliance:   NA       Changes in Health Issues:   None reported     Chemical Use Review:   Substance Use: Chemical use reviewed, no active concerns identified      Tobacco Use: No change in amount of tobacco use since last session.  Patient declined discussion at this time    Diagnosis:  1. Attention deficit hyperactivity disorder (ADHD), unspecified ADHD type    2. Anxiety          Collateral Reports Completed:   Not Applicable    PLAN: (Patient Tasks / Therapist Tasks / Other)  HOMEWORK: Daily mood tracking        Kati Mcgill [facility]                                                         ______________________________________________________________________    Individual Treatment Plan    Patient's Name: Mary Dixon  YOB: 1980    Date of Creation: 5/3/22  Date Treatment Plan Last Reviewed/Revised: 5/3/22    DSM5 Diagnoses: Attention-Deficit/Hyperactivity Disorder  314.01 (F90.9) Unspecified Attention -Deficit / Hyperactivity Disorder or 300.00 (F41.9) Unspecified Anxiety Disorder  Psychosocial / Contextual Factors:   PROMIS (reviewed every 90 days):     Referral / Collaboration:  The following referral(s) was/were discussed but patient declines follow up at this time. DBT Group Therapy.    Anticipated number of session for this episode of care: 12+  Anticipation frequency of session: Every other week  Anticipated Duration of each session: 38-52 minutes  Treatment plan will be reviewed in 90 days or when goals have been changed.       MeasurableTreatment  Goal(s) related to diagnosis / functional impairment(s)  Goal 1: Patient will reduce anxiety levels and panic attacks as evidence by DONELL-7 scores and patient report.       Objective #A (Patient Action)    Patient will identify at least 3 triggers for anxiety.  Status: New - Date: 5/3/22     Intervention(s)  Therapist will teach emotional recognition/identification. DBT Mindfulness skills.    Objective #B  Patient will learn and identify cognitive distortions that impact anxiety levels.  Status: New - Date: 5/3/22     Intervention(s)  Therapist will assign homework    teach identification and challenge of cognitive distortions.    Objective #C  Patient will use relaxation strategies 1+ times per day to reduce the physical symptoms of anxiety.  Status: New - Date: 5/3/22     Intervention(s)  Therapist will assign homework    teach DBT Mindfulness, Relaxation skills.      Goal 2: Patient will increase understanding and engagement in healthy relationships.     Objective #A (Patient Action)    Status: New - Date: 5/3/22     Patient will learn and apply Interpersonal Effectiveness skills at least once daily to improve communication skills.     Intervention(s)  Therapist will assign homework    role-play effective communication skills  teach how to choose an intensity for asking or saying 'no'.  Interpersonal Effectiveness skills.       Patient has reviewed and agreed to the above plan.      BETH Waterman  5/16/22

## 2022-05-24 ENCOUNTER — VIRTUAL VISIT (OUTPATIENT)
Dept: PSYCHOLOGY | Facility: CLINIC | Age: 42
End: 2022-05-24
Payer: COMMERCIAL

## 2022-05-24 DIAGNOSIS — F90.9 ATTENTION DEFICIT HYPERACTIVITY DISORDER (ADHD), UNSPECIFIED ADHD TYPE: ICD-10-CM

## 2022-05-24 DIAGNOSIS — F41.9 ANXIETY: Primary | ICD-10-CM

## 2022-05-24 PROCEDURE — 90837 PSYTX W PT 60 MINUTES: CPT | Mod: 95 | Performed by: COUNSELOR

## 2022-05-24 NOTE — PROGRESS NOTES
[facility]                                     Progress Note    Patient Name: Mary Dixon  Date: 22         Service Type: Individual      Session Start Time: 1:30m  Session End Time: 2:53     Session Length: 45 m    Session #: 4    Attendees: Client attended alone    Service Modality:  Video Visit:      Provider verified identity through the following two step process.  Patient provided:  Patient     Telemedicine Visit: The patient's condition can be safely assessed and treated via synchronous audio and visual telemedicine encounter.      Reason for Telemedicine Visit: Services only offered telehealth    Originating Site (Patient Location): Patient's other - parked car    Distant Site (Provider Location): Provider Remote Setting- Home Office    Consent:  The patient/guardian has verbally consented to: the potential risks and benefits of telemedicine (video visit) versus in person care; bill my insurance or make self-payment for services provided; and responsibility for payment of non-covered services.     Patient would like the video invitation sent by:  My Chart    Mode of Communication:  Video Conference via Amwell    As the provider I attest to compliance with applicable laws and regulations related to telemedicine.    DATA  Interactive Complexity: No  Crisis: No        Progress Since Last Session (Related to Symptoms / Goals / Homework):   Symptoms: No change      Homework: Did not complete      Episode of Care Goals: No improvement - CONTEMPLATION (Considering change and yet undecided); Intervened by assessing the negative and positive thinking (ambivalence) about behavior change     Current / Ongoing Stressors and Concerns:   Client discussed ongoing challenges with interpersonal relationships and conflict resolution.      Treatment Objective(s) Addressed in This Session:    Patient will identify at least 3 triggers for anxiety.     Intervention:   DBT: Coached on Mindfulness  skills and diary card completion to increase insight levels and pattern recognition.  Reviewed homework and barriers to completion. Utilized open-ended questions, reflection and summary to assist with insight levels.       Assessments completed prior to visit:  The following assessments were completed by patient for this visit:  PHQ9:   PHQ-9 SCORE 6/15/2021   PHQ-9 Total Score 0     GAD7:   DONELL-7 SCORE 3/17/2020 6/15/2021 11/19/2021   Total Score 7 (mild anxiety) - 6 (mild anxiety)   Total Score 7 10 6     CAGE-AID:   CAGE-AID Total Score 3/22/2022   Total Score 0   Total Score MyChart 0 (A total score of 2 or greater is considered clinically significant)     PROMIS 10-Global Health (only subscores and total score):   PROMIS-10 Scores Only 3/22/2022   Global Mental Health Score 9   Global Physical Health Score 16   PROMIS TOTAL - SUBSCORES 25     [place] Suicide Severity Rating Scale (Lifetime/Recent)  [place] Suicide Severity Rating (Lifetime/Recent) 1/15/2019   Q1 Wished to be Dead (Past Month) no   Q2 Suicidal Thoughts (Past Month) no   Q6 Suicide Behavior (Lifetime) no         ASSESSMENT: Current Emotional / Mental Status (status of significant symptoms):   Risk status (Self / Other harm or suicidal ideation)   Patient denies current fears or concerns for personal safety.   Patient denies current or recent suicidal ideation or behaviors.   Patient denies current or recent homicidal ideation or behaviors.   Patient denies current or recent self injurious behavior or ideation.   Patient denies other safety concerns.   Patient reports there has been no change in risk factors since their last session.     Patient reports there has been no change in protective factors since their last session.     Recommended that patient call 911 or go to the local ED should there be a change in any of these risk factors.     Appearance:   Appropriate    Eye Contact:   Fair    Psychomotor Behavior: Normal     Attitude:   Cooperative    Orientation:   All   Speech    Rate / Production: Normal     Volume:  Normal    Mood:    Anxious  Sad    Affect:    Appropriate  Tearful   Thought Content:  Clear    Thought Form:  Coherent  Logical    Insight:    Fair      Medication Review:   No changes to current psychiatric medication(s)     Medication Compliance:   NA       Changes in Health Issues:   None reported     Chemical Use Review:   Substance Use: Chemical use reviewed, no active concerns identified      Tobacco Use: No change in amount of tobacco use since last session.  Patient declined discussion at this time    Diagnosis:  1. Anxiety    2. Attention deficit hyperactivity disorder (ADHD), unspecified ADHD type          Collateral Reports Completed:   Not Applicable    PLAN: (Patient Tasks / Therapist Tasks / Other)  HOMEWORK: Reassigned- Daily mood tracking  New- Identify ways in which she feels respected and connected in relationships.        Kati Mcgill, [facility]                                                         ______________________________________________________________________    Individual Treatment Plan    Patient's Name: Mary Dixon  YOB: 1980    Date of Creation: 5/3/22  Date Treatment Plan Last Reviewed/Revised: 5/3/22    DSM5 Diagnoses: Attention-Deficit/Hyperactivity Disorder  314.01 (F90.9) Unspecified Attention -Deficit / Hyperactivity Disorder or 300.00 (F41.9) Unspecified Anxiety Disorder  Psychosocial / Contextual Factors:   PROMIS (reviewed every 90 days):     Referral / Collaboration:  The following referral(s) was/were discussed but patient declines follow up at this time. DBT Group Therapy.    Anticipated number of session for this episode of care: 12+  Anticipation frequency of session: Every other week  Anticipated Duration of each session: 38-52 minutes  Treatment plan will be reviewed in 90 days or when goals have been changed.       MeasurableTreatment Goal(s) related to  diagnosis / functional impairment(s)  Goal 1: Patient will reduce anxiety levels and panic attacks as evidence by DONELL-7 scores and patient report.       Objective #A (Patient Action)    Patient will identify at least 3 triggers for anxiety.  Status: New - Date: 5/3/22     Intervention(s)  Therapist will teach emotional recognition/identification. DBT Mindfulness skills.    Objective #B  Patient will learn and identify cognitive distortions that impact anxiety levels.  Status: New - Date: 5/3/22     Intervention(s)  Therapist will assign homework    teach identification and challenge of cognitive distortions.    Objective #C  Patient will use relaxation strategies 1+ times per day to reduce the physical symptoms of anxiety.  Status: New - Date: 5/3/22     Intervention(s)  Therapist will assign homework    teach DBT Mindfulness, Relaxation skills.      Goal 2: Patient will increase understanding and engagement in healthy relationships.     Objective #A (Patient Action)    Status: New - Date: 5/3/22     Patient will learn and apply Interpersonal Effectiveness skills at least once daily to improve communication skills.     Intervention(s)  Therapist will assign homework    role-play effective communication skills  teach how to choose an intensity for asking or saying 'no'.  Interpersonal Effectiveness skills.       Patient has reviewed and agreed to the above plan.      Kati Mcgill [facility]  5/24/22

## 2022-06-28 ENCOUNTER — VIRTUAL VISIT (OUTPATIENT)
Dept: PSYCHOLOGY | Facility: CLINIC | Age: 42
End: 2022-06-28
Payer: COMMERCIAL

## 2022-06-28 DIAGNOSIS — F41.9 ANXIETY: Primary | ICD-10-CM

## 2022-06-28 DIAGNOSIS — F90.9 ATTENTION DEFICIT HYPERACTIVITY DISORDER (ADHD), UNSPECIFIED ADHD TYPE: ICD-10-CM

## 2022-06-28 PROCEDURE — 90834 PSYTX W PT 45 MINUTES: CPT | Mod: 95 | Performed by: COUNSELOR

## 2022-06-28 NOTE — PROGRESS NOTES
[facility]                                     Progress Note    Patient Name: Mary Dixon  Date: 22         Service Type: Individual      Session Start Time: 2:30m  Session End Time: 3:15     Session Length: 45 m    Session #: 5    Attendees: Client attended alone    Service Modality:  Video Visit:      Provider verified identity through the following two step process.  Patient provided:  Patient     Telemedicine Visit: The patient's condition can be safely assessed and treated via synchronous audio and visual telemedicine encounter.      Reason for Telemedicine Visit: Services only offered telehealth    Originating Site (Patient Location): Patient's other - parked car    Distant Site (Provider Location): Provider Remote Setting- Home Office    Consent:  The patient/guardian has verbally consented to: the potential risks and benefits of telemedicine (video visit) versus in person care; bill my insurance or make self-payment for services provided; and responsibility for payment of non-covered services.     Patient would like the video invitation sent by:  My Chart    Mode of Communication:  Video Conference via Amwell    As the provider I attest to compliance with applicable laws and regulations related to telemedicine.    DATA  Interactive Complexity: No  Crisis: No        Progress Since Last Session (Related to Symptoms / Goals / Homework):   Symptoms: No change        Homework: Achieved / completed to satisfaction      Episode of Care Goals: Minimal progress - PREPARATION (Decided to change - considering how); Intervened by negotiating a change plan and determining options / strategies for behavior change, identifying triggers, exploring social supports, and working towards setting a date to begin behavior change     Current / Ongoing Stressors and Concerns:   Client discussed ongoing challenges with interpersonal relationships and conflict resolution. Client reports stressors  including juan COVID and is having vehicle concerns. Explored challenges with conflict resolution, boundary setting, and self-respect that interferes with relationships.        Treatment Objective(s) Addressed in This Session:    Patient will identify at least 3 triggers for anxiety.   Patient will learn and apply Interpersonal Effectiveness skills at least once daily to improve communication skills.      Intervention:   DBT:   Reviewed homework and insights gathered from them. Utilized open-ended questions, reflection and summary to assist with insight levels. Explored assertiveness skills and boundary identification.       Assessments completed prior to visit:  The following assessments were completed by patient for this visit:  PHQ9:   PHQ-9 SCORE 6/15/2021   PHQ-9 Total Score 0     GAD7:   DONELL-7 SCORE 3/17/2020 6/15/2021 11/19/2021   Total Score 7 (mild anxiety) - 6 (mild anxiety)   Total Score 7 10 6     CAGE-AID:   CAGE-AID Total Score 3/22/2022   Total Score 0   Total Score MyChart 0 (A total score of 2 or greater is considered clinically significant)     PROMIS 10-Global Health (only subscores and total score):   PROMIS-10 Scores Only 3/22/2022 6/28/2022   Global Mental Health Score 9 12   Global Physical Health Score 16 15   PROMIS TOTAL - SUBSCORES 25 27     [place] Suicide Severity Rating Scale (Lifetime/Recent)  [place] Suicide Severity Rating (Lifetime/Recent) 1/15/2019   Q1 Wished to be Dead (Past Month) no   Q2 Suicidal Thoughts (Past Month) no   Q6 Suicide Behavior (Lifetime) no         ASSESSMENT: Current Emotional / Mental Status (status of significant symptoms):   Risk status (Self / Other harm or suicidal ideation)   Patient denies current fears or concerns for personal safety.   Patient denies current or recent suicidal ideation or behaviors.   Patient denies current or recent homicidal ideation or behaviors.   Patient denies current or recent self injurious behavior or  ideation.   Patient denies other safety concerns.   Patient reports there has been no change in risk factors since their last session.     Patient reports there has been no change in protective factors since their last session.     Recommended that patient call 911 or go to the local ED should there be a change in any of these risk factors.     Appearance:   Appropriate    Eye Contact:   Fair    Psychomotor Behavior: Normal    Attitude:   Cooperative    Orientation:   All   Speech    Rate / Production: Normal     Volume:  Normal    Mood:    Anxious    Affect:    Appropriate  Tearful     Thought Content:  Clear    Thought Form:  Coherent  Logical    Insight:    Fair      Medication Review:   No changes to current psychiatric medication(s)     Medication Compliance:   NA       Changes in Health Issues:   None reported     Chemical Use Review:   Substance Use: Chemical use reviewed, no active concerns identified      Tobacco Use: No change in amount of tobacco use since last session.  Patient declined discussion at this time    Diagnosis:  1. Anxiety    2. Attention deficit hyperactivity disorder (ADHD), unspecified ADHD type          Collateral Reports Completed:   Not Applicable    PLAN: (Patient Tasks / Therapist Tasks / Other)  HOMEWORK: Reassigned- Daily mood tracking  New- Identify examples of interpersonal interactions/conflict that she wishes to complete behavioral analysis.         Kati Mcgill [facility]                                                         ______________________________________________________________________    Individual Treatment Plan    Patient's Name: Mary Dixon  YOB: 1980    Date of Creation: 5/3/22  Date Treatment Plan Last Reviewed/Revised: 5/3/22    DSM5 Diagnoses: Attention-Deficit/Hyperactivity Disorder  314.01 (F90.9) Unspecified Attention -Deficit / Hyperactivity Disorder or 300.00 (F41.9) Unspecified Anxiety Disorder  Psychosocial / Contextual Factors:   PROMIS  (reviewed every 90 days):     Referral / Collaboration:  The following referral(s) was/were discussed but patient declines follow up at this time. DBT Group Therapy.    Anticipated number of session for this episode of care: 12+  Anticipation frequency of session: Every other week  Anticipated Duration of each session: 38-52 minutes  Treatment plan will be reviewed in 90 days or when goals have been changed.       MeasurableTreatment Goal(s) related to diagnosis / functional impairment(s)  Goal 1: Patient will reduce anxiety levels and panic attacks as evidence by DONELL-7 scores and patient report.       Objective #A (Patient Action)    Patient will identify at least 3 triggers for anxiety.  Status: New - Date: 5/3/22     Intervention(s)  Therapist will teach emotional recognition/identification. DBT Mindfulness skills.    Objective #B  Patient will learn and identify cognitive distortions that impact anxiety levels.  Status: New - Date: 5/3/22     Intervention(s)  Therapist will assign homework    teach identification and challenge of cognitive distortions.    Objective #C  Patient will use relaxation strategies 1+ times per day to reduce the physical symptoms of anxiety.  Status: New - Date: 5/3/22     Intervention(s)  Therapist will assign homework    teach DBT Mindfulness, Relaxation skills.      Goal 2: Patient will increase understanding and engagement in healthy relationships.     Objective #A (Patient Action)    Status: New - Date: 5/3/22     Patient will learn and apply Interpersonal Effectiveness skills at least once daily to improve communication skills.     Intervention(s)  Therapist will assign homework    role-play effective communication skills  teach how to choose an intensity for asking or saying 'no'.  Interpersonal Effectiveness skills.       Patient has reviewed and agreed to the above plan.      BETH Waterman  6/28/22

## 2022-07-25 ENCOUNTER — VIRTUAL VISIT (OUTPATIENT)
Dept: PSYCHOLOGY | Facility: CLINIC | Age: 42
End: 2022-07-25
Payer: COMMERCIAL

## 2022-07-25 DIAGNOSIS — F41.9 ANXIETY: Primary | ICD-10-CM

## 2022-07-25 DIAGNOSIS — F90.9 ATTENTION DEFICIT HYPERACTIVITY DISORDER (ADHD), UNSPECIFIED ADHD TYPE: ICD-10-CM

## 2022-07-25 PROCEDURE — 90834 PSYTX W PT 45 MINUTES: CPT | Mod: 95 | Performed by: COUNSELOR

## 2022-07-25 NOTE — PROGRESS NOTES
[facility]                                     Progress Note    Patient Name: Mary Dixon  Date: 22         Service Type: Individual      Session Start Time: 9:02 am  Session End Time: 9:45     Session Length: 43 m    Session #: 6    Attendees: Client attended alone    Service Modality:  Video Visit:      Provider verified identity through the following two step process.  Patient provided:  Patient     Telemedicine Visit: The patient's condition can be safely assessed and treated via synchronous audio and visual telemedicine encounter.      Reason for Telemedicine Visit: Services only offered telehealth    Originating Site (Patient Location): Patient's other - parked car    Distant Site (Provider Location): Provider Remote Setting- Home Office    Consent:  The patient/guardian has verbally consented to: the potential risks and benefits of telemedicine (video visit) versus in person care; bill my insurance or make self-payment for services provided; and responsibility for payment of non-covered services.     Patient would like the video invitation sent by:  My Chart    Mode of Communication:  Video Conference via Amwell    As the provider I attest to compliance with applicable laws and regulations related to telemedicine.    DATA  Interactive Complexity: No  Crisis: No        Progress Since Last Session (Related to Symptoms / Goals / Homework):   Symptoms: No change         Homework: Partially completed       Episode of Care Goals: Minimal progress - ACTION (Actively working towards change); Intervened by reinforcing change plan / affirming steps taken      Current / Ongoing Stressors and Concerns:   Client discussed ongoing challenges with interpersonal relationships and conflict resolution. Client reports stressors including juan COVID and is having vehicle concerns. Explored challenges with conflict resolution, boundary setting, and self-respect that interferes with  relationships.        Treatment Objective(s) Addressed in This Session:    Patient will identify at least 3 triggers for anxiety.   Patient will learn and apply Interpersonal Effectiveness skills at least once daily to improve communication skills.      Intervention:   DBT:   Reviewed homework and insights gathered from them. Utilized open-ended questions, reflection and summary to assist with insight levels. Explored assertiveness skills and boundary identification.  Explored ADHD diagnosis and how this may have impacted interpersonal relationships and self-esteem.       Assessments completed prior to visit:  The following assessments were completed by patient for this visit:  PHQ9:   PHQ-9 SCORE 6/15/2021   PHQ-9 Total Score 0     GAD7:   DONELL-7 SCORE 3/17/2020 6/15/2021 11/19/2021   Total Score 7 (mild anxiety) - 6 (mild anxiety)   Total Score 7 10 6     CAGE-AID:   CAGE-AID Total Score 3/22/2022   Total Score 0   Total Score MyChart 0 (A total score of 2 or greater is considered clinically significant)     PROMIS 10-Global Health (only subscores and total score):   PROMIS-10 Scores Only 3/22/2022 6/28/2022 7/24/2022   Global Mental Health Score 9 12 13   Global Physical Health Score 16 15 16   PROMIS TOTAL - SUBSCORES 25 27 29     [place] Suicide Severity Rating Scale (Lifetime/Recent)  [place] Suicide Severity Rating (Lifetime/Recent) 1/15/2019   Q1 Wished to be Dead (Past Month) no   Q2 Suicidal Thoughts (Past Month) no   Q6 Suicide Behavior (Lifetime) no         ASSESSMENT: Current Emotional / Mental Status (status of significant symptoms):   Risk status (Self / Other harm or suicidal ideation)   Patient denies current fears or concerns for personal safety.   Patient denies current or recent suicidal ideation or behaviors.   Patient denies current or recent homicidal ideation or behaviors.   Patient denies current or recent self injurious behavior or ideation.   Patient denies other safety  concerns.   Patient reports there has been no change in risk factors since their last session.     Patient reports there has been no change in protective factors since their last session.     Recommended that patient call 911 or go to the local ED should there be a change in any of these risk factors.     Appearance:   Not Assessed    Eye Contact:   Not Assessed    Psychomotor Behavior: Not Assessed    Attitude:   Cooperative    Orientation:   All   Speech    Rate / Production: Normal     Volume:  Normal    Mood:    Anxious    Affect:    Appropriate  Tearful     Thought Content:  Clear    Thought Form:  Coherent  Logical    Insight:    Fair      Medication Review:   No changes to current psychiatric medication(s)     Medication Compliance:   NA       Changes in Health Issues:   None reported     Chemical Use Review:   Substance Use: Chemical use reviewed, no active concerns identified      Tobacco Use: No change in amount of tobacco use since last session.  Patient declined discussion at this time    Diagnosis:  1. Anxiety    2. Attention deficit hyperactivity disorder (ADHD), unspecified ADHD type          Collateral Reports Completed:   Not Applicable    PLAN: (Patient Tasks / Therapist Tasks / Other)  HOMEWORK: Reassigned- Daily mood tracking  New- Identify examples of interpersonal interactions/conflict. Mindfulness around triggers to rejection with others.         Kati Mcgill [facility]                                                         ______________________________________________________________________    Individual Treatment Plan    Patient's Name: Mary Dixon  YOB: 1980    Date of Creation: 5/3/22  Date Treatment Plan Last Reviewed/Revised: 5/3/22    DSM5 Diagnoses: Attention-Deficit/Hyperactivity Disorder  314.01 (F90.9) Unspecified Attention -Deficit / Hyperactivity Disorder or 300.00 (F41.9) Unspecified Anxiety Disorder  Psychosocial / Contextual Factors:   PROMIS (reviewed every 90  days):     Referral / Collaboration:  The following referral(s) was/were discussed but patient declines follow up at this time. DBT Group Therapy.    Anticipated number of session for this episode of care: 12+  Anticipation frequency of session: Every other week  Anticipated Duration of each session: 38-52 minutes  Treatment plan will be reviewed in 90 days or when goals have been changed.       MeasurableTreatment Goal(s) related to diagnosis / functional impairment(s)  Goal 1: Patient will reduce anxiety levels and panic attacks as evidence by DONELL-7 scores and patient report.       Objective #A (Patient Action)    Patient will identify at least 3 triggers for anxiety.  Status: New - Date: 5/3/22     Intervention(s)  Therapist will teach emotional recognition/identification. DBT Mindfulness skills.    Objective #B  Patient will learn and identify cognitive distortions that impact anxiety levels.  Status: New - Date: 5/3/22     Intervention(s)  Therapist will assign homework    teach identification and challenge of cognitive distortions.    Objective #C  Patient will use relaxation strategies 1+ times per day to reduce the physical symptoms of anxiety.  Status: New - Date: 5/3/22     Intervention(s)  Therapist will assign homework    teach DBT Mindfulness, Relaxation skills.      Goal 2: Patient will increase understanding and engagement in healthy relationships.     Objective #A (Patient Action)    Status: New - Date: 5/3/22     Patient will learn and apply Interpersonal Effectiveness skills at least once daily to improve communication skills.     Intervention(s)  Therapist will assign homework    role-play effective communication skills  teach how to choose an intensity for asking or saying 'no'.  Interpersonal Effectiveness skills.       Patient has reviewed and agreed to the above plan.      BETH Waterman  7/25/22

## 2022-08-22 ENCOUNTER — VIRTUAL VISIT (OUTPATIENT)
Dept: PSYCHOLOGY | Facility: CLINIC | Age: 42
End: 2022-08-22
Payer: COMMERCIAL

## 2022-08-22 DIAGNOSIS — F41.9 ANXIETY: Primary | ICD-10-CM

## 2022-08-22 DIAGNOSIS — F90.9 ATTENTION DEFICIT HYPERACTIVITY DISORDER (ADHD), UNSPECIFIED ADHD TYPE: ICD-10-CM

## 2022-08-22 PROCEDURE — 90834 PSYTX W PT 45 MINUTES: CPT | Mod: 95 | Performed by: COUNSELOR

## 2022-08-22 NOTE — PROGRESS NOTES
[facility]                                     Progress Note    Patient Name: Mary Dixon  Date: 22         Service Type: Individual      Session Start Time: 9:02 am  Session End Time: 9:45     Session Length: 43 m    Session #: 7    Attendees: Client attended alone    Service Modality:  Video Visit:      Provider verified identity through the following two step process.  Patient provided:  Patient     Telemedicine Visit: The patient's condition can be safely assessed and treated via synchronous audio and visual telemedicine encounter.      Reason for Telemedicine Visit: Services only offered telehealth    Originating Site (Patient Location): Patient's other - parked car    Distant Site (Provider Location): Provider Remote Setting- Home Office    Consent:  The patient/guardian has verbally consented to: the potential risks and benefits of telemedicine (video visit) versus in person care; bill my insurance or make self-payment for services provided; and responsibility for payment of non-covered services.     Patient would like the video invitation sent by:  My Chart    Mode of Communication:  Video Conference via Amwell    As the provider I attest to compliance with applicable laws and regulations related to telemedicine.    DATA  Interactive Complexity: No  Crisis: No        Progress Since Last Session (Related to Symptoms / Goals / Homework):   Symptoms: No change         Homework: Partially completed         Episode of Care Goals: Minimal progress - ACTION (Actively working towards change); Intervened by reinforcing change plan / affirming steps taken      Current / Ongoing Stressors and Concerns:   Client discussed ongoing challenges with interpersonal relationships and conflict resolution. Explored interpersonal triggers with family members. Discussed possible job opportunities.        Treatment Objective(s) Addressed in This Session:    Patient will identify at least 3 triggers  for anxiety.   Patient will learn and apply Interpersonal Effectiveness skills at least once daily to improve communication skills.       Intervention:   DBT:   Reviewed homework and insights gathered from them. Utilized open-ended questions, reflection and summary to assist with insight levels. Explored assertiveness skills and boundary identification.  Explored ADHD diagnosis and how this may have impacted interpersonal relationships and self-esteem.        Assessments completed prior to visit:  The following assessments were completed by patient for this visit:  PHQ9:   PHQ-9 SCORE 6/15/2021   PHQ-9 Total Score 0     GAD7:   DONELL-7 SCORE 3/17/2020 6/15/2021 11/19/2021   Total Score 7 (mild anxiety) - 6 (mild anxiety)   Total Score 7 10 6     CAGE-AID:   CAGE-AID Total Score 3/22/2022   Total Score 0   Total Score MyChart 0 (A total score of 2 or greater is considered clinically significant)     PROMIS 10-Global Health (only subscores and total score):   PROMIS-10 Scores Only 3/22/2022 6/28/2022 7/24/2022 8/21/2022   Global Mental Health Score 9 12 13 12   Global Physical Health Score 16 15 16 16   PROMIS TOTAL - SUBSCORES 25 27 29 28     [place] Suicide Severity Rating Scale (Lifetime/Recent)  [place] Suicide Severity Rating (Lifetime/Recent) 1/15/2019   Q1 Wished to be Dead (Past Month) no   Q2 Suicidal Thoughts (Past Month) no   Q6 Suicide Behavior (Lifetime) no         ASSESSMENT: Current Emotional / Mental Status (status of significant symptoms):   Risk status (Self / Other harm or suicidal ideation)   Patient denies current fears or concerns for personal safety.   Patient denies current or recent suicidal ideation or behaviors.   Patient denies current or recent homicidal ideation or behaviors.   Patient denies current or recent self injurious behavior or ideation.   Patient denies other safety concerns.   Patient reports there has been no change in risk factors since their last session.     Patient reports  there has been no change in protective factors since their last session.     Recommended that patient call 911 or go to the local ED should there be a change in any of these risk factors.     Appearance:   Appropriate    Eye Contact:   Good    Psychomotor Behavior: Normal    Attitude:   Cooperative    Orientation:   All   Speech    Rate / Production: Normal     Volume:  Normal    Mood:    Anxious    Affect:    Appropriate  Tearful     Thought Content:  Clear    Thought Form:  Coherent  Logical    Insight:    Fair      Medication Review:   No changes to current psychiatric medication(s)     Medication Compliance:   NA       Changes in Health Issues:   None reported     Chemical Use Review:   Substance Use: Chemical use reviewed, no active concerns identified      Tobacco Use: No change in amount of tobacco use since last session.  Patient declined discussion at this time    Diagnosis:  1. Anxiety    2. Attention deficit hyperactivity disorder (ADHD), unspecified ADHD type          Collateral Reports Completed:   Not Applicable    PLAN: (Patient Tasks / Therapist Tasks / Other)  HOMEWORK: Reassigned- Daily mood tracking  Identify examples of interpersonal interactions/conflict. Mindfulness around triggers to rejection with others.   Complete employment paperwork.         Kati Mcgill [facility]                                                         ______________________________________________________________________    Individual Treatment Plan    Patient's Name: Mary Dixon  YOB: 1980    Date of Creation: 5/3/22  Date Treatment Plan Last Reviewed/Revised: 8/22/22    DSM5 Diagnoses: Attention-Deficit/Hyperactivity Disorder  314.01 (F90.9) Unspecified Attention -Deficit / Hyperactivity Disorder or 300.00 (F41.9) Unspecified Anxiety Disorder  Psychosocial / Contextual Factors:   PROMIS (reviewed every 90 days):     Referral / Collaboration:  The following referral(s) was/were discussed but patient  declines follow up at this time. DBT Group Therapy.    Anticipated number of session for this episode of care: 12+  Anticipation frequency of session: Every other week  Anticipated Duration of each session: 38-52 minutes  Treatment plan will be reviewed in 90 days or when goals have been changed.       MeasurableTreatment Goal(s) related to diagnosis / functional impairment(s)  Goal 1: Patient will reduce anxiety levels and panic attacks as evidence by DONELL-7 scores and patient report.       Objective #A (Patient Action)    Patient will identify at least 3 triggers for anxiety.  Status: New - Date: 5/3/22 , Continued: 8/22/22    Intervention(s)  Therapist will teach emotional recognition/identification. DBT Mindfulness skills.    Objective #B  Patient will learn and identify cognitive distortions that impact anxiety levels.  Status: New - Date: 5/3/22 , Continued: 8/22/22    Intervention(s)  Therapist will assign homework    teach identification and challenge of cognitive distortions.    Objective #C  Patient will use relaxation strategies 1+ times per day to reduce the physical symptoms of anxiety.  Status: New - Date: 5/3/22 , Continued: 8/22/22    Intervention(s)  Therapist will assign homework    teach DBT Mindfulness, Relaxation skills.      Goal 2: Patient will increase understanding and engagement in healthy relationships.     Objective #A (Patient Action)    Status: New - Date: 5/3/22 , Continued: 8/22/22    Patient will learn and apply Interpersonal Effectiveness skills at least once daily to improve communication skills.     Intervention(s)  Therapist will assign homework    role-play effective communication skills  teach how to choose an intensity for asking or saying 'no'.  Interpersonal Effectiveness skills.       Patient has reviewed and agreed to the above plan.      BETH Waterman  8/22/22

## 2022-09-06 ENCOUNTER — MYC MEDICAL ADVICE (OUTPATIENT)
Dept: FAMILY MEDICINE | Facility: CLINIC | Age: 42
End: 2022-09-06

## 2022-10-17 ENCOUNTER — VIRTUAL VISIT (OUTPATIENT)
Dept: FAMILY MEDICINE | Facility: CLINIC | Age: 42
End: 2022-10-17
Payer: COMMERCIAL

## 2022-10-17 DIAGNOSIS — E11.9 TYPE 2 DIABETES MELLITUS WITHOUT COMPLICATION, WITHOUT LONG-TERM CURRENT USE OF INSULIN (H): ICD-10-CM

## 2022-10-17 DIAGNOSIS — Z13.220 SCREENING FOR HYPERLIPIDEMIA: ICD-10-CM

## 2022-10-17 DIAGNOSIS — I10 ESSENTIAL HYPERTENSION: ICD-10-CM

## 2022-10-17 DIAGNOSIS — I10 PRIMARY HYPERTENSION: ICD-10-CM

## 2022-10-17 PROCEDURE — 99214 OFFICE O/P EST MOD 30 MIN: CPT | Mod: TEL | Performed by: PHYSICIAN ASSISTANT

## 2022-10-17 RX ORDER — LISINOPRIL 10 MG/1
10 TABLET ORAL DAILY
Qty: 90 TABLET | Refills: 3 | Status: SHIPPED | OUTPATIENT
Start: 2022-10-17 | End: 2023-01-26

## 2022-10-17 NOTE — PROGRESS NOTES
"Mary is a 42 year old who is being evaluated via a billable video visit.      How would you like to obtain your AVS? MyChart  If the video visit is dropped, the invitation should be resent by: Call: [phone]  Will anyone else be joining your video visit? No        Assessment & Plan     (E11.9) Type 2 diabetes mellitus without complication, without long-term current use of insulin (H)  Comment:   Plan: HEMOGLOBIN A1C, Lipid panel reflex to direct         LDL Non-fasting, Albumin Random Urine         Quantitative with Creat Ratio,         Semaglutide-Weight Management 1 MG/0.5ML SOAJ            (I10) Primary hypertension  Comment:   Plan: BASIC METABOLIC PANEL            (Z13.220) Screening for hyperlipidemia  Comment:   Plan: Lipid panel reflex to direct LDL Non-fasting            (I10) Essential hypertension  Comment:   Plan: lisinopril (ZESTRIL) 10 MG tablet                 Nicotine/Tobacco Cessation:  She reports that she has been smoking cigarettes. She has a 1.00 pack-year smoking history. She has never used smokeless tobacco.      BMI:   Estimated body mass index is 48.58 kg/m  as calculated from the following:    Height as of 21: 1.753 m (5' 9\").    Weight as of 22: 149.2 kg (329 lb).         Return in about 3 months (around 2023) for Lab Work.    Riri Meyers PA-C  [facility]    Torrie Hernandez is a 42 year old, presenting for the following health issues:  Recheck Medication      HPI     Answers for HPI/ROS submitted by the patient on 10/12/2022  What is the reason for your visit today? :  prescriptions, and check in.  How many servings of fruits and vegetables do you eat daily?: 0-1  On average, how many sweetened beverages do you drink each day (Examples: soda, juice, sweet tea, etc.  Do NOT count diet or artificially sweetened beverages)?: 0  How many minutes a day do you exercise enough to make your heart beat faster?: 10 to 19  How many days a " "week do you exercise enough to make your   heart beat faster?: 4      Checking in   Had COVID this summer  Not taking any anxiety medications right now -stopped them because she felt like she didn't need them  Seeing a counselor -  Loves it  Still not working -  [place] it. Has heard from past coworkers and family members that it is \"horrible\" right now at bedside.       Weight down to 310lb but overall down 3 pants sizes  Spent March - May  Walking 5 miles a day      Review of Systems   Remainder of ROS obtained and found to be negative other than that which was documented above        Objective           Vitals:  No vitals were obtained today due to virtual visit.    Physical Exam   GENERAL: Healthy, alert and no distress  EYES: Eyes grossly normal to inspection.  No discharge or erythema, or obvious scleral/conjunctival abnormalities.  RESP: No audible wheeze, cough, or visible cyanosis.  No visible retractions or increased work of breathing.    SKIN: Visible skin clear. No significant rash, abnormal pigmentation or lesions.  NEURO: Cranial nerves grossly intact.  Mentation and speech appropriate for age.  PSYCH: Mentation appears normal, affect normal/bright, judgement and insight intact, normal speech and appearance well-groomed.    Diagnostic Tests:   Ordered - future labs placed            Phone call Duration: 17 minutes  *Unable to connect audio via video visit    "

## 2022-10-18 DIAGNOSIS — I10 ESSENTIAL HYPERTENSION: ICD-10-CM

## 2022-10-18 DIAGNOSIS — E11.9 TYPE 2 DIABETES MELLITUS WITHOUT COMPLICATION, WITHOUT LONG-TERM CURRENT USE OF INSULIN (H): ICD-10-CM

## 2022-10-18 RX ORDER — LISINOPRIL 10 MG/1
10 TABLET ORAL DAILY
Qty: 90 TABLET | Refills: 3 | OUTPATIENT
Start: 2022-10-18

## 2022-10-18 NOTE — TELEPHONE ENCOUNTER
Reason for call:  Patient reporting a symptom    Symptom or request: Pt calling for 2 requests:  1.  Pt wants to know if Rx for Semaglutide is enough for 3 mo with a refill?    2.  Please send 2 new Rxs for Lisinopril & Semaglutide to NEW mail orders pharmacy - Must use this pharmacy, per insurance.    No need to call patient back, unless there are questions or problems.      Duration (how long have symptoms been present): ongoing    Have you been treated for this before? Yes    Additional comments:     Phone Number patient can be reached at:  Home number on file [phone] (home)    Best Time:  Any    Can we leave a detailed message on this number:  YES    Call taken on 10/18/2022 at 10:50 AM by Amelia oHrta

## 2022-10-19 ENCOUNTER — VIRTUAL VISIT (OUTPATIENT)
Dept: PSYCHOLOGY | Facility: CLINIC | Age: 42
End: 2022-10-19
Payer: COMMERCIAL

## 2022-10-19 DIAGNOSIS — F90.9 ATTENTION DEFICIT HYPERACTIVITY DISORDER (ADHD), UNSPECIFIED ADHD TYPE: ICD-10-CM

## 2022-10-19 DIAGNOSIS — F41.9 ANXIETY: Primary | ICD-10-CM

## 2022-10-19 PROCEDURE — 90834 PSYTX W PT 45 MINUTES: CPT | Mod: 95 | Performed by: COUNSELOR

## 2022-10-19 NOTE — PROGRESS NOTES
[facility]                                     Progress Note    Patient Name: Mary Dixon  Date: 10/19/22         Service Type: Individual      Session Start Time: 9 am  Session End Time: 9:45     Session Length: 45 m    Session #: 8    Attendees: Client attended alone    Service Modality:  Video Visit:      Provider verified identity through the following two step process.  Patient provided:  Patient     Telemedicine Visit: The patient's condition can be safely assessed and treated via synchronous audio and visual telemedicine encounter.      Reason for Telemedicine Visit: Services only offered telehealth    Originating Site (Patient Location): Patient's other - parked car    Distant Site (Provider Location): Provider Remote Setting- Home Office    Consent:  The patient/guardian has verbally consented to: the potential risks and benefits of telemedicine (video visit) versus in person care; bill my insurance or make self-payment for services provided; and responsibility for payment of non-covered services.     Patient would like the video invitation sent by:  My Chart    Mode of Communication:  Video Conference via Amwell    As the provider I attest to compliance with applicable laws and regulations related to telemedicine.    DATA  Interactive Complexity: No  Crisis: No        Progress Since Last Session (Related to Symptoms / Goals / Homework):   Symptoms: No change          Homework: Partially completed         Episode of Care Goals: Minimal progress - ACTION (Actively working towards change); Intervened by reinforcing change plan / affirming steps taken      Current / Ongoing Stressors and Concerns:   Client discussed ongoing challenges with interpersonal relationships and conflict resolution. Explored interpersonal triggers within relationships.        Treatment Objective(s) Addressed in This Session:    Patient will identify at least 3 triggers for anxiety.    Patient will learn and  apply Interpersonal Effectiveness skills at least once daily to improve communication skills.       Intervention:   DBT:   Reviewed homework and insights gathered from them. Utilized open-ended questions, reflection and summary to assist with insight levels. Explored assertiveness skills and boundary identification.  Discussed recommendation for DBT therapy to address communication and relationship struggles.       Assessments completed prior to visit:  The following assessments were completed by patient for this visit:  PHQ9:   PHQ-9 SCORE 6/15/2021   PHQ-9 Total Score 0     GAD7:   DONELL-7 SCORE 3/17/2020 6/15/2021 11/19/2021   Total Score 7 (mild anxiety) - 6 (mild anxiety)   Total Score 7 10 6     CAGE-AID:   CAGE-AID Total Score 3/22/2022   Total Score 0   Total Score MyChart 0 (A total score of 2 or greater is considered clinically significant)     PROMIS 10-Global Health (only subscores and total score):   PROMIS-10 Scores Only 3/22/2022 6/28/2022 7/24/2022 8/21/2022   Global Mental Health Score 9 12 13 12   Global Physical Health Score 16 15 16 16   PROMIS TOTAL - SUBSCORES 25 27 29 28     [place] Suicide Severity Rating Scale (Lifetime/Recent)  [place] Suicide Severity Rating (Lifetime/Recent) 1/15/2019   Q1 Wished to be Dead (Past Month) no   Q2 Suicidal Thoughts (Past Month) no   Q6 Suicide Behavior (Lifetime) no         ASSESSMENT: Current Emotional / Mental Status (status of significant symptoms):   Risk status (Self / Other harm or suicidal ideation)   Patient denies current fears or concerns for personal safety.   Patient denies current or recent suicidal ideation or behaviors.   Patient denies current or recent homicidal ideation or behaviors.   Patient denies current or recent self injurious behavior or ideation.   Patient denies other safety concerns.   Patient reports there has been no change in risk factors since their last session.     Patient reports there has been no change in protective  factors since their last session.     Recommended that patient call 911 or go to the local ED should there be a change in any of these risk factors.     Appearance:   Appropriate    Eye Contact:   Good    Psychomotor Behavior: Normal    Attitude:   Cooperative    Orientation:   All   Speech    Rate / Production: Normal     Volume:  Normal    Mood:    Anxious    Affect:    Appropriate      Thought Content:  Clear    Thought Form:  Coherent  Logical    Insight:    Fair      Medication Review:   No changes to current psychiatric medication(s)     Medication Compliance:   NA       Changes in Health Issues:   None reported     Chemical Use Review:   Substance Use: Chemical use reviewed, no active concerns identified      Tobacco Use: No change in amount of tobacco use since last session.  Patient declined discussion at this time    Diagnosis:  1. Anxiety    2. Attention deficit hyperactivity disorder (ADHD), unspecified ADHD type          Collateral Reports Completed:   Not Applicable    PLAN: (Patient Tasks / Therapist Tasks / Other)  HOMEWORK: Reassigned- Daily mood tracking  Identify examples of interpersonal interactions/conflict. Mindfulness around triggers to rejection with others.    Explore DBT treatment recommendation.           Kati Mcgill, [facility]                                                         ______________________________________________________________________    Individual Treatment Plan    Patient's Name: Mary Dixon  YOB: 1980    Date of Creation: 5/3/22  Date Treatment Plan Last Reviewed/Revised: 8/22/22    DSM5 Diagnoses: Attention-Deficit/Hyperactivity Disorder  314.01 (F90.9) Unspecified Attention -Deficit / Hyperactivity Disorder or 300.00 (F41.9) Unspecified Anxiety Disorder   [place] Borderline Personality Disorder  Psychosocial / Contextual Factors:   PROMIS (reviewed every 90 days):     Referral / Collaboration:  The following referral(s) was/were discussed but patient  declines follow up at this time. DBT Group Therapy.    Anticipated number of session for this episode of care: 12+  Anticipation frequency of session: Every other week  Anticipated Duration of each session: 38-52 minutes  Treatment plan will be reviewed in 90 days or when goals have been changed.       MeasurableTreatment Goal(s) related to diagnosis / functional impairment(s)  Goal 1: Patient will reduce anxiety levels and panic attacks as evidence by DONELL-7 scores and patient report.       Objective #A (Patient Action)    Patient will identify at least 3 triggers for anxiety.  Status: New - Date: 5/3/22 , Continued: 8/22/22    Intervention(s)  Therapist will teach emotional recognition/identification. DBT Mindfulness skills.    Objective #B  Patient will learn and identify cognitive distortions that impact anxiety levels.  Status: New - Date: 5/3/22 , Continued: 8/22/22    Intervention(s)  Therapist will assign homework    teach identification and challenge of cognitive distortions.    Objective #C  Patient will use relaxation strategies 1+ times per day to reduce the physical symptoms of anxiety.  Status: New - Date: 5/3/22 , Continued: 8/22/22    Intervention(s)  Therapist will assign homework    teach DBT Mindfulness, Relaxation skills.      Goal 2: Patient will increase understanding and engagement in healthy relationships.     Objective #A (Patient Action)    Status: New - Date: 5/3/22 , Continued: 8/22/22    Patient will learn and apply Interpersonal Effectiveness skills at least once daily to improve communication skills.     Intervention(s)  Therapist will assign homework    role-play effective communication skills  teach how to choose an intensity for asking or saying 'no'.  Interpersonal Effectiveness skills.       Patient has reviewed and agreed to the above plan.      BETH Waterman  10/19/22

## 2022-10-25 DIAGNOSIS — E66.01 MORBID OBESITY (H): ICD-10-CM

## 2022-10-25 DIAGNOSIS — E11.9 TYPE 2 DIABETES MELLITUS WITHOUT COMPLICATION, WITHOUT LONG-TERM CURRENT USE OF INSULIN (H): ICD-10-CM

## 2022-10-26 RX ORDER — SEMAGLUTIDE 1.34 MG/ML
INJECTION, SOLUTION SUBCUTANEOUS
Qty: 1.5 ML | OUTPATIENT
Start: 2022-10-26

## 2022-10-27 NOTE — TELEPHONE ENCOUNTER
I just sent in a refill for the 1mg dose after our visit on 10/17 - is she requesting this instead or was this an error?     Riri Meyers PA-C

## 2022-11-03 ENCOUNTER — MYC MEDICAL ADVICE (OUTPATIENT)
Dept: FAMILY MEDICINE | Facility: CLINIC | Age: 42
End: 2022-11-03

## 2022-11-03 DIAGNOSIS — E11.9 TYPE 2 DIABETES MELLITUS WITHOUT COMPLICATION, WITHOUT LONG-TERM CURRENT USE OF INSULIN (H): ICD-10-CM

## 2022-11-04 DIAGNOSIS — E11.9 TYPE 2 DIABETES MELLITUS WITHOUT COMPLICATION, WITHOUT LONG-TERM CURRENT USE OF INSULIN (H): ICD-10-CM

## 2022-11-07 NOTE — TELEPHONE ENCOUNTER
Patient states she needs this sent to [facility] mail order now as [facility] is no longer her provider. I resent to [facility] for her.    Holly Sepulveda RN

## 2022-11-08 ENCOUNTER — TELEPHONE (OUTPATIENT)
Dept: FAMILY MEDICINE | Facility: CLINIC | Age: 42
End: 2022-11-08

## 2022-11-08 DIAGNOSIS — E11.9 TYPE 2 DIABETES MELLITUS WITHOUT COMPLICATION, WITHOUT LONG-TERM CURRENT USE OF INSULIN (H): ICD-10-CM

## 2022-11-08 NOTE — TELEPHONE ENCOUNTER
[facility] called and say they need the quantity to be 2 if you want it to last for 7 days.      Diane Mckeon, LEILA Thompson

## 2022-11-08 NOTE — TELEPHONE ENCOUNTER
Sweta from [facility] calling looking for a refill of wegovy. Rx sent in 11/07/22, she does see that now.  Reba PEREZ RN

## 2022-11-09 ENCOUNTER — TELEPHONE (OUTPATIENT)
Dept: FAMILY MEDICINE | Facility: CLINIC | Age: 42
End: 2022-11-09

## 2022-11-09 DIAGNOSIS — E11.9 TYPE 2 DIABETES MELLITUS WITHOUT COMPLICATION, WITHOUT LONG-TERM CURRENT USE OF INSULIN (H): Primary | ICD-10-CM

## 2022-11-09 NOTE — TELEPHONE ENCOUNTER
Prior Authorization Retail Medication Request    Medication/Dose:   ICD code (if different than what is on RX):  Type 2 diabetes mellitus without complication, without long-term current use of insulin (H) [E11.9]   Previously Tried and Failed:    Rationale:      Insurance Name:  [employer]  Insurance ID:  [id number]      Pharmacy Information (if different than what is on RX)  Name:  [facility], [place]  Phone:  [phone]

## 2022-11-11 NOTE — TELEPHONE ENCOUNTER
Central Prior Authorization Team   Phone: [phone]      PA Initiation    Medication: Wegovy Semaglutide-Weight Management 1 MG/0.5ML SOAJ--INITIATED  Insurance Company: [employer] - Phone [phone] Fax [phone]  Pharmacy Filling the Rx: [id number] - [place], [place] - [address]  Filling Pharmacy Phone: [phone]  Filling Pharmacy Fax:    Start Date: 11/11/2022

## 2022-11-15 ENCOUNTER — VIRTUAL VISIT (OUTPATIENT)
Dept: PSYCHOLOGY | Facility: CLINIC | Age: 42
End: 2022-11-15
Payer: COMMERCIAL

## 2022-11-15 DIAGNOSIS — F41.9 ANXIETY: Primary | ICD-10-CM

## 2022-11-15 DIAGNOSIS — F90.9 ATTENTION DEFICIT HYPERACTIVITY DISORDER (ADHD), UNSPECIFIED ADHD TYPE: ICD-10-CM

## 2022-11-15 PROCEDURE — 90834 PSYTX W PT 45 MINUTES: CPT | Mod: 95 | Performed by: COUNSELOR

## 2022-11-15 RX ORDER — SEMAGLUTIDE 1.34 MG/ML
1 INJECTION, SOLUTION SUBCUTANEOUS
Qty: 3 ML | Refills: 3 | Status: SHIPPED | OUTPATIENT
Start: 2022-11-15 | End: 2022-11-16

## 2022-11-15 NOTE — TELEPHONE ENCOUNTER
Central Prior Authorization Team   Phone: [phone]      PRIOR AUTHORIZATION DENIED    Medication: Wegovy Semaglutide-Weight Management 1 MG/0.5ML SOAJ--DENIED    Denial Date: 11/15/2022    Denial Rational:    WEGOVY DENIED FOR DIAGNOSIS OF DIABETES    Appeal Information:

## 2022-11-15 NOTE — PROGRESS NOTES
Discharge Summary  Multiple Sessions    Client Name: Mary Dixon MRN#: [id number] YOB: 1980    Discharge Date:   November 15, 2022    Service Modality: Video Visit:      Provider verified identity through the following two step process.  Patient provided:  Patient     Telemedicine Visit: The patient's condition can be safely assessed and treated via synchronous audio and visual telemedicine encounter.      Reason for Telemedicine Visit: Services only offered telehealth    Originating Site (Patient Location): Patient's home    Distant Site (Provider Location): Provider Remote Setting- Home Office    Consent:  The patient/guardian has verbally consented to: the potential risks and benefits of telemedicine (video visit) versus in person care; bill my insurance or make self-payment for services provided; and responsibility for payment of non-covered services.     Patient would like the video invitation sent by:  My Chart    Mode of Communication:  Video Conference via Amwell    Distant Location (Provider):  Off-site    As the provider I attest to compliance with applicable laws and regulations related to telemedicine.    Service Type: Individual      Session Start Time: 9am  Session End Time: 9:45      Session Length: 45 - 50     Session #: 9     Attendees: Client attended alone      Focus of Treatment Objective(s):  Client's presenting concerns included: Depressed Mood -    Anxiety -    Stage of Change at time of Discharge: PREPARATION (Decided to change - considering how)    Medication Adherence:  Yes    Chemical Use:  NA    Assessment: Current Emotional / Mental Status (status of significant symptoms):    Risk status (Self / Other harm or suicidal ideation)  Client denies current fears or concerns for personal safety.  Client denies current or recent suicidal ideation or behaviors.  Client denies current or recent homicidal ideation or behaviors.  Client denies current or recent  self injurious behavior or ideation.  Client denies other safety concerns.  A safety and risk management plan has not been developed at this time, however client was given the after-hours number should there be a change in any of these risk factors.    Appearance:   Not Assessed   Eye Contact:   Not Assessed   Psychomotor Behavior: Not Assessed   Attitude:   Cooperative   Orientation:   All  Speech   Rate / Production: Normal    Volume:  Normal   Mood:    Anxious  Depressed   Affect:    Appropriate   Thought Content:  Clear   Thought Form:  Coherent  Logical   Insight:   Fair     DSM5 Diagnoses: (Sustained by DSM5 Criteria Listed Above)  DSM5 Diagnoses: Attention-Deficit/Hyperactivity Disorder  314.01 (F90.9) Unspecified Attention -Deficit / Hyperactivity Disorder or 300.00 (F41.9) Unspecified Anxiety Disorder   Rule Out Borderline Personality Disorder    Reason for Discharge:  Referred to DBT programming      Aftercare Plan:  Client may resume counseling services at any time in the future by calling the [facility] Intake Office, [phone].  Client will participate in group therapy      BETH Waterman  November 15, 2022

## 2022-11-16 ENCOUNTER — TELEPHONE (OUTPATIENT)
Dept: FAMILY MEDICINE | Facility: CLINIC | Age: 42
End: 2022-11-16

## 2022-11-16 DIAGNOSIS — E11.9 TYPE 2 DIABETES MELLITUS WITHOUT COMPLICATION, WITHOUT LONG-TERM CURRENT USE OF INSULIN (H): ICD-10-CM

## 2022-11-16 RX ORDER — SEMAGLUTIDE 1.34 MG/ML
1 INJECTION, SOLUTION SUBCUTANEOUS
Qty: 3 ML | Refills: 3 | Status: SHIPPED | OUTPATIENT
Start: 2022-11-16 | End: 2023-01-13

## 2022-11-20 ENCOUNTER — HEALTH MAINTENANCE LETTER (OUTPATIENT)
Age: 42
End: 2022-11-20

## 2023-01-12 ENCOUNTER — MYC MEDICAL ADVICE (OUTPATIENT)
Dept: FAMILY MEDICINE | Facility: CLINIC | Age: 43
End: 2023-01-12

## 2023-01-12 DIAGNOSIS — E11.9 TYPE 2 DIABETES MELLITUS WITHOUT COMPLICATION, WITHOUT LONG-TERM CURRENT USE OF INSULIN (H): ICD-10-CM

## 2023-01-13 ENCOUNTER — TELEPHONE (OUTPATIENT)
Dept: FAMILY MEDICINE | Facility: CLINIC | Age: 43
End: 2023-01-13

## 2023-01-13 DIAGNOSIS — E11.9 TYPE 2 DIABETES MELLITUS WITHOUT COMPLICATION, WITHOUT LONG-TERM CURRENT USE OF INSULIN (H): ICD-10-CM

## 2023-01-13 RX ORDER — SEMAGLUTIDE 1.34 MG/ML
1 INJECTION, SOLUTION SUBCUTANEOUS
Qty: 3 ML | Refills: 3 | Status: SHIPPED | OUTPATIENT
Start: 2023-01-13 | End: 2024-01-08

## 2023-01-13 RX ORDER — SEMAGLUTIDE 1.34 MG/ML
1 INJECTION, SOLUTION SUBCUTANEOUS
Qty: 3 ML | Refills: 3 | Status: SHIPPED | OUTPATIENT
Start: 2023-01-13 | End: 2023-01-13

## 2023-01-13 NOTE — TELEPHONE ENCOUNTER
Routing refill request to provider for review/approval because:  Patient due for annual,physical, Failed protocol: last A1C was on 10/11/21 (6.9)  Ismael Colunga RN

## 2023-01-14 RX ORDER — SEMAGLUTIDE 1.34 MG/ML
INJECTION, SOLUTION SUBCUTANEOUS
Qty: 3 ML | Refills: 3 | OUTPATIENT
Start: 2023-01-14

## 2023-01-26 ENCOUNTER — OFFICE VISIT (OUTPATIENT)
Dept: FAMILY MEDICINE | Facility: CLINIC | Age: 43
End: 2023-01-26
Payer: COMMERCIAL

## 2023-01-26 ENCOUNTER — ANCILLARY PROCEDURE (OUTPATIENT)
Dept: MAMMOGRAPHY | Facility: HOSPITAL | Age: 43
End: 2023-01-26
Attending: PHYSICIAN ASSISTANT
Payer: COMMERCIAL

## 2023-01-26 VITALS
SYSTOLIC BLOOD PRESSURE: 138 MMHG | TEMPERATURE: 97.2 F | BODY MASS INDEX: 43.4 KG/M2 | WEIGHT: 293 LBS | HEIGHT: 69 IN | DIASTOLIC BLOOD PRESSURE: 84 MMHG | HEART RATE: 80 BPM

## 2023-01-26 DIAGNOSIS — Z12.31 ENCOUNTER FOR SCREENING MAMMOGRAM FOR BREAST CANCER: ICD-10-CM

## 2023-01-26 DIAGNOSIS — Z12.4 CERVICAL CANCER SCREENING: ICD-10-CM

## 2023-01-26 DIAGNOSIS — I10 ESSENTIAL HYPERTENSION: ICD-10-CM

## 2023-01-26 DIAGNOSIS — E11.65 TYPE 2 DIABETES MELLITUS WITH HYPERGLYCEMIA, WITHOUT LONG-TERM CURRENT USE OF INSULIN (H): ICD-10-CM

## 2023-01-26 DIAGNOSIS — Z13.220 SCREENING FOR HYPERLIPIDEMIA: ICD-10-CM

## 2023-01-26 DIAGNOSIS — Z00.00 ROUTINE GENERAL MEDICAL EXAMINATION AT A HEALTH CARE FACILITY: Primary | ICD-10-CM

## 2023-01-26 DIAGNOSIS — Z11.3 SCREEN FOR STD (SEXUALLY TRANSMITTED DISEASE): ICD-10-CM

## 2023-01-26 LAB
ANION GAP SERPL CALCULATED.3IONS-SCNC: 11 MMOL/L (ref 7–15)
BUN SERPL-MCNC: 9.9 MG/DL (ref 6–20)
CALCIUM SERPL-MCNC: 9.5 MG/DL (ref 8.6–10)
CHLORIDE SERPL-SCNC: 106 MMOL/L (ref 98–107)
CHOLEST SERPL-MCNC: 171 MG/DL
CREAT SERPL-MCNC: 0.84 MG/DL (ref 0.51–0.95)
CREAT UR-MCNC: 342.3 MG/DL
DEPRECATED HCO3 PLAS-SCNC: 24 MMOL/L (ref 22–29)
GFR SERPL CREATININE-BSD FRML MDRD: 90 ML/MIN/1.73M2
GLUCOSE SERPL-MCNC: 100 MG/DL (ref 70–99)
HBA1C MFR BLD: 6.2 % (ref 0–5.6)
HDLC SERPL-MCNC: 46 MG/DL
HIV 1+2 AB+HIV1 P24 AG SERPL QL IA: NONREACTIVE
LDLC SERPL CALC-MCNC: 102 MG/DL
MICROALBUMIN UR-MCNC: 13.8 MG/L
MICROALBUMIN/CREAT UR: 4.03 MG/G CR (ref 0–25)
NONHDLC SERPL-MCNC: 125 MG/DL
POTASSIUM SERPL-SCNC: 4.4 MMOL/L (ref 3.4–5.3)
SODIUM SERPL-SCNC: 141 MMOL/L (ref 136–145)
T PALLIDUM AB SER QL: NONREACTIVE
TRIGL SERPL-MCNC: 114 MG/DL

## 2023-01-26 PROCEDURE — 36415 COLL VENOUS BLD VENIPUNCTURE: CPT | Performed by: PHYSICIAN ASSISTANT

## 2023-01-26 PROCEDURE — 99213 OFFICE O/P EST LOW 20 MIN: CPT | Mod: 25 | Performed by: PHYSICIAN ASSISTANT

## 2023-01-26 PROCEDURE — 82043 UR ALBUMIN QUANTITATIVE: CPT | Performed by: PHYSICIAN ASSISTANT

## 2023-01-26 PROCEDURE — G0145 SCR C/V CYTO,THINLAYER,RESCR: HCPCS | Performed by: PHYSICIAN ASSISTANT

## 2023-01-26 PROCEDURE — 80061 LIPID PANEL: CPT | Performed by: PHYSICIAN ASSISTANT

## 2023-01-26 PROCEDURE — 87591 N.GONORRHOEAE DNA AMP PROB: CPT | Performed by: PHYSICIAN ASSISTANT

## 2023-01-26 PROCEDURE — 87389 HIV-1 AG W/HIV-1&-2 AB AG IA: CPT | Performed by: PHYSICIAN ASSISTANT

## 2023-01-26 PROCEDURE — 82570 ASSAY OF URINE CREATININE: CPT | Performed by: PHYSICIAN ASSISTANT

## 2023-01-26 PROCEDURE — 83036 HEMOGLOBIN GLYCOSYLATED A1C: CPT | Performed by: PHYSICIAN ASSISTANT

## 2023-01-26 PROCEDURE — 86780 TREPONEMA PALLIDUM: CPT | Performed by: PHYSICIAN ASSISTANT

## 2023-01-26 PROCEDURE — 99396 PREV VISIT EST AGE 40-64: CPT | Performed by: PHYSICIAN ASSISTANT

## 2023-01-26 PROCEDURE — 87624 HPV HI-RISK TYP POOLED RSLT: CPT | Performed by: PHYSICIAN ASSISTANT

## 2023-01-26 PROCEDURE — 87491 CHLMYD TRACH DNA AMP PROBE: CPT | Performed by: PHYSICIAN ASSISTANT

## 2023-01-26 PROCEDURE — 80048 BASIC METABOLIC PNL TOTAL CA: CPT | Performed by: PHYSICIAN ASSISTANT

## 2023-01-26 PROCEDURE — 77067 SCR MAMMO BI INCL CAD: CPT

## 2023-01-26 RX ORDER — LISINOPRIL 10 MG/1
10 TABLET ORAL DAILY
Qty: 90 TABLET | Refills: 3 | Status: SHIPPED | OUTPATIENT
Start: 2023-01-26 | End: 2024-01-08

## 2023-01-26 ASSESSMENT — ENCOUNTER SYMPTOMS
SHORTNESS OF BREATH: 0
PALPITATIONS: 0
EYE PAIN: 0
DIARRHEA: 0
HEMATOCHEZIA: 0
CONSTIPATION: 0
NERVOUS/ANXIOUS: 1
SORE THROAT: 0
DYSURIA: 0
DIZZINESS: 0
FREQUENCY: 0
ARTHRALGIAS: 0
HEADACHES: 0
BREAST MASS: 0
MYALGIAS: 0
CHILLS: 0
PARESTHESIAS: 0
HEARTBURN: 0
HEMATURIA: 0
COUGH: 0
FEVER: 0
ABDOMINAL PAIN: 0
JOINT SWELLING: 0
WEAKNESS: 0
NAUSEA: 0

## 2023-01-26 NOTE — PROGRESS NOTES
SUBJECTIVE:   CC: Mary is an 42 year old who presents for preventive health visit.   Patient has been advised of split billing requirements and indicates understanding: Yes  Healthy Habits:     Getting at least 3 servings of Calcium per day:  NO    Bi-annual eye exam:  Yes    Dental care twice a year:  NO    Sleep apnea or symptoms of sleep apnea:  None    Diet:  Diabetic    Frequency of exercise:  2-3 days/week    Duration of exercise:  Less than 15 minutes    Taking medications regularly:  Yes    Medication side effects:  Other    PHQ-2 Total Score: 0    Additional concerns today:  Yes    Will likely be losing insurance  Needs to get labs and medications updated  Losing weight - would like to go up to next dose of ozempic if possible      Diabetes Follow-up  How often are you checking your blood sugar? A few times a week  What time of day are you checking your blood sugars (select all that apply)?  Before and after meals  Have you had any blood sugars above 200?  No  Have you had any blood sugars below 70?  No    What symptoms do you notice when your blood sugar is low?  None    What concerns do you have today about your diabetes? None     Do you have any of these symptoms? (Select all that apply)  Blurry vision    Have you had a diabetic eye exam in the last 12 months? No    BP Readings from Last 2 Encounters:   04/13/22 (!) 172/136   11/22/21 136/74     Hemoglobin A1C (%)   Date Value   10/11/2021 6.9 (H)   06/15/2021 8.0 (H)   06/17/2019 7.3 (H)     LDL Cholesterol Calculated (mg/dL)   Date Value   06/15/2021 128 (H)   06/17/2019 107 (H)         Today's PHQ-2 Score:   PHQ-2 ( 1999 Pfizer) 1/26/2023   Q1: Little interest or pleasure in doing things 0   Q2: Feeling down, depressed or hopeless 0   PHQ-2 Score 0   PHQ-2 Total Score (12-17 Years)- Positive if 3 or more points; Administer PHQ-A if positive -   Q1: Little interest or pleasure in doing things Not at all   Q2: Feeling down, depressed or hopeless  Not at all   PHQ-2 Score 0       Have you ever done Advance Care Planning? (For example, a Health Directive, POLST, or a discussion with a medical provider or your loved ones about your wishes):     Social History     Tobacco Use     Smoking status: Every Day     Packs/day: 0.50     Years: 2.00     Pack years: 1.00     Types: Cigarettes     Smokeless tobacco: Never   Substance Use Topics     Alcohol use: No     If you drink alcohol do you typically have >3 drinks per day or >7 drinks per week? No    Alcohol Use 1/26/2023   Prescreen: >3 drinks/day or >7 drinks/week? No   Prescreen: >3 drinks/day or >7 drinks/week? -   No flowsheet data found.    Reviewed orders with patient.  Reviewed health maintenance and updated orders accordingly - Yes  BP Readings from Last 3 Encounters:   01/26/23 138/84   04/13/22 (!) 172/136   11/22/21 136/74    Wt Readings from Last 3 Encounters:   01/26/23 135.2 kg (298 lb)   04/13/22 149.2 kg (329 lb)   11/22/21 (!) 151.5 kg (334 lb)                    Breast Cancer Screening:    FHS-7:   Breast CA Risk Assessment (FHS-7) 1/26/2023   Did any of your first-degree relatives have breast or ovarian cancer? Yes   Did any of your relatives have bilateral breast cancer? Unknown   Did any man in your family have breast cancer? Unknown   Did any woman in your family have breast and ovarian cancer? Yes   Did any woman in your family have breast cancer before age 50 y? Unknown   Do you have 2 or more relatives with breast and/or ovarian cancer? Unknown   Do you have 2 or more relatives with breast and/or bowel cancer? Unknown         Pertinent mammograms are reviewed under the imaging tab.    History of abnormal Pap smear: NO - age 30- 65 PAP every 3 years recommended  PAP / HPV 11/1/2011 10/4/2010 11/16/2009   PAP (Historical) ASC-US(A) ASC-US(A) NIL     Reviewed and updated as needed this visit by clinical staff                  Reviewed and updated as needed this visit by Provider                "      Review of Systems   Constitutional: Negative for chills and fever.   HENT: Negative for congestion, ear pain, hearing loss and sore throat.    Eyes: Negative for pain and visual disturbance.   Respiratory: Negative for cough and shortness of breath.    Cardiovascular: Negative for chest pain, palpitations and peripheral edema.   Gastrointestinal: Negative for abdominal pain, constipation, diarrhea, heartburn, hematochezia and nausea.   Breasts:  Negative for tenderness, breast mass and discharge.   Genitourinary: Positive for vaginal bleeding. Negative for dysuria, frequency, genital sores, hematuria, pelvic pain, urgency and vaginal discharge.   Musculoskeletal: Negative for arthralgias, joint swelling and myalgias.   Skin: Negative for rash.   Neurological: Negative for dizziness, weakness, headaches and paresthesias.   Psychiatric/Behavioral: Negative for mood changes. The patient is nervous/anxious.           OBJECTIVE:   Ht 1.753 m (5' 9\")   Wt 135.2 kg (298 lb)   BMI 44.01 kg/m    Physical Exam  GENERAL: healthy, alert and no distress  EYES: Eyes grossly normal to inspection, PERRL and conjunctivae and sclerae normal  HENT: ear canals and TM's normal, nose and mouth without ulcers or lesions  NECK: no adenopathy, no asymmetry, masses, or scars and thyroid normal to palpation  RESP: lungs clear to auscultation - no rales, rhonchi or wheezes  BREAST: normal without masses, tenderness or nipple discharge and no palpable axillary masses or adenopathy  CV: regular rate and rhythm, normal S1 S2, no S3 or S4, no murmur, click or rub, no peripheral edema and peripheral pulses strong  ABDOMEN: soft, nontender, no hepatosplenomegaly, no masses and bowel sounds normal   (female): normal female external genitalia, normal urethral meatus, vaginal mucosa pink, moist, well rugated, and normal cervix/adnexa/uterus without masses or discharge  MS: no gross musculoskeletal defects noted, no edema  SKIN: no suspicious " lesions or rashes  NEURO: Normal strength and tone, mentation intact and speech normal  PSYCH: mentation appears normal, affect normal/bright    Diagnostic Test Results:  pending    ASSESSMENT/PLAN:   (Z00.00) Routine general medical examination at a health care facility  (primary encounter diagnosis)  Comment:   Plan: PAP updated today    (Z12.4) Cervical cancer screening  Comment:   Plan: Pap Screen with HPV - recommended age 30 - 65         years            (E11.65) Type 2 diabetes mellitus with hyperglycemia, without long-term current use of insulin (H)  Comment:   Plan: Semaglutide, 2 MG/DOSE, (OZEMPIC) 8 MG/3ML pen            (I10) Essential hypertension  Comment:   Plan: lisinopril (ZESTRIL) 10 MG tablet            (Z11.3) Screen for STD (sexually transmitted disease)  Comment:   Plan: Chlamydia trachomatis PCR - Clinic Collect,         Neisseria gonorrhoeae PCR - Clinic Collect, HIV        Antigen Antibody Combo, Treponema Abs w Reflex         to RPR and Titer            (Z12.31) Encounter for screening mammogram for breast cancer  Comment:   Plan: MA Screen Bilateral w/Jasper           (Z13.220) Screening for hyperlipidemia  Comment:   Plan: labs today        Patient has been advised of split billing requirements and indicates understanding: Yes      COUNSELING:  Reviewed preventive health counseling, as reflected in patient instructions        She reports that she has been smoking cigarettes. She has a 1.00 pack-year smoking history. She has never used smokeless tobacco.      Riri Meyers PA-C  [facility]

## 2023-01-27 LAB
C TRACH DNA SPEC QL NAA+PROBE: NEGATIVE
N GONORRHOEA DNA SPEC QL NAA+PROBE: NEGATIVE

## 2023-01-30 ENCOUNTER — TRANSFERRED RECORDS (OUTPATIENT)
Dept: HEALTH INFORMATION MANAGEMENT | Facility: CLINIC | Age: 43
End: 2023-01-30

## 2023-01-30 LAB
BKR LAB AP GYN ADEQUACY: NORMAL
BKR LAB AP GYN INTERPRETATION: NORMAL
BKR LAB AP HPV REFLEX: NORMAL
BKR LAB AP PREVIOUS ABNORMAL: NORMAL
PATH REPORT.COMMENTS IMP SPEC: NORMAL
PATH REPORT.COMMENTS IMP SPEC: NORMAL
PATH REPORT.RELEVANT HX SPEC: NORMAL

## 2023-02-01 ENCOUNTER — PATIENT OUTREACH (OUTPATIENT)
Dept: FAMILY MEDICINE | Facility: CLINIC | Age: 43
End: 2023-02-01
Payer: COMMERCIAL

## 2023-02-01 DIAGNOSIS — R87.810 CERVICAL HIGH RISK HPV (HUMAN PAPILLOMAVIRUS) TEST POSITIVE: ICD-10-CM

## 2023-02-01 LAB
HUMAN PAPILLOMA VIRUS 16 DNA: NEGATIVE
HUMAN PAPILLOMA VIRUS 18 DNA: NEGATIVE
HUMAN PAPILLOMA VIRUS FINAL DIAGNOSIS: ABNORMAL
HUMAN PAPILLOMA VIRUS OTHER HR: POSITIVE

## 2023-06-23 ENCOUNTER — MYC MEDICAL ADVICE (OUTPATIENT)
Dept: FAMILY MEDICINE | Facility: CLINIC | Age: 43
End: 2023-06-23
Payer: COMMERCIAL

## 2023-06-23 ENCOUNTER — TELEPHONE (OUTPATIENT)
Dept: FAMILY MEDICINE | Facility: CLINIC | Age: 43
End: 2023-06-23
Payer: COMMERCIAL

## 2023-06-23 DIAGNOSIS — E11.65 TYPE 2 DIABETES MELLITUS WITH HYPERGLYCEMIA, WITHOUT LONG-TERM CURRENT USE OF INSULIN (H): ICD-10-CM

## 2023-06-23 NOTE — LETTER
July 25, 2023      Mary Dixon  [address] [place] [address]        To Whom It May Concern,     I submit this request for appeal on behalf of my patient, Mary Dixon, for health insurance coverage of her prescribed medication, Ozempic (semaglutide) for Type 2 Diabetes     Patient was prescribed this medication initially in June of 2021 at which time her A1c was 8.0 and her weight was 333lb. She has had great success on this medication with her most recent A1c in January 2023 down to 6.2 and her weight down to 298lb.     At this point in her treatment plan it could be detrimental and a big disservice to have her change to a different medication as there is no guarantee that she would tolerate the medication as well nor that it would have the same positive impact on both her glucose control and weight. Given we should be practicing preventative medication to prevent future health concerns associated with uncontrolled sugars and weight, I think it is unnecessary and poor medicine to attempt to change her to something else at this point in her therapy.     Please reconsider the denial of ozempic and allow us to continue the positive progress already made with this medication       Sincerely,           DEAN Brennan [facility]             Statement Selected

## 2023-06-23 NOTE — TELEPHONE ENCOUNTER
Prior Authorization Retail Medication Request    Medication/Dose:   ICD code (if different than what is on RX):  Type 2 diabetes mellitus with hyperglycemia, without long-term current use of insulin (H) [E11.65]  Previously Tried and Failed:    Rationale:      Covermymeds:  [place] Medicaid  ID: [id number]  Key: [id number]  Last Name: Destiny  : 1980    Pharmacy Information (if different than what is on RX)  Name:  [facility]  Phone:  [phone]

## 2023-06-28 NOTE — TELEPHONE ENCOUNTER
PA Initiation    Medication: SEMAGLUTIDE (2 MG/DOSE) 8 MG/3ML SC SOPN  Insurance Company: [place] Medicaid ([facility]) - Phone [phone] Fax [phone]  Pharmacy Filling the Rx: [id number] - [place], [place] - [address]  Filling Pharmacy Phone: [phone]  Filling Pharmacy Fax:    Start Date: 6/28/2023    Central Prior Authorization Team   Phone: [phone]

## 2023-06-28 NOTE — TELEPHONE ENCOUNTER
PRIOR AUTHORIZATION DENIED    Medication: SEMAGLUTIDE (2 MG/DOSE) 8 MG/3ML SC SOPN  Insurance Company: [place] Medicaid ([facility]) - Phone [phone] Fax [phone]  Denial Date: 6/28/2023  Denial Rational:     Appeal Information:   Patient Notified: Yes

## 2023-06-29 NOTE — TELEPHONE ENCOUNTER
Call placed to patient  Relayed Alexy's message    Patient states she lost her normal insurance through a divorce and currently has state insurance    Patient states she has been on the medication for over 2 years and will absolutely not change from the Ozempic   Patient very short with author and adamant she will not trial a new medication    Patient hung up on author before asking if patient was going to use a coupon to pay out of pocket    Amos Gregorio RN

## 2023-06-29 NOTE — TELEPHONE ENCOUNTER
It looks like the prior auth was denied as they are asking that she try/fail at least 2 other medications first. One of the medications is Liraglutide (saxenda) which is also used in weight loss. Would she be willing to switch to this? I can try for a letter of appeal but I have not had the letter work - it seems with the prior auth is denied there is nothing to change it, especially when the reason for denial is need to try other meds first    Riri Meyers PA-C

## 2023-07-25 NOTE — TELEPHONE ENCOUNTER
A letter of appeal was completed for the ozempic for diabetes. Can we please forward this to the necessary place.     Riri Meyers PA-C

## 2023-08-09 ENCOUNTER — TELEPHONE (OUTPATIENT)
Dept: FAMILY MEDICINE | Facility: CLINIC | Age: 43
End: 2023-08-09
Payer: COMMERCIAL

## 2023-09-10 ENCOUNTER — HEALTH MAINTENANCE LETTER (OUTPATIENT)
Age: 43
End: 2023-09-10

## 2023-12-02 ENCOUNTER — MYC MEDICAL ADVICE (OUTPATIENT)
Dept: FAMILY MEDICINE | Facility: CLINIC | Age: 43
End: 2023-12-02
Payer: COMMERCIAL

## 2023-12-02 DIAGNOSIS — F41.8 SITUATIONAL ANXIETY: ICD-10-CM

## 2023-12-04 RX ORDER — PROPRANOLOL HYDROCHLORIDE 20 MG/1
TABLET ORAL
Qty: 270 TABLET | Refills: 0 | Status: SHIPPED | OUTPATIENT
Start: 2023-12-04

## 2024-01-07 NOTE — COMMUNITY RESOURCES LIST (ENGLISH)
01/07/2024   [facility]  N/A  For questions about this resource list or additional care needs, please contact your primary care clinic or care manager.  Phone: [phone]   Email: N/A   Address[facility] [place] [address]   Hours: N/A        Food and Nutrition       Food pantry  1  [facility] Distance: 1.04 miles      [facility] [place] [address]  Language: English, Australian  Hours: Wed 6:30 PM - 7:00 PM  Fees: Free   Phone: (463) 568-3267 Email: [email or web address] Website: [email or web address]     2  [employer] [facility] [employer] Distance: 1.61 miles      [place] [address]  Language: English, Australian  Hours: Mon - Tue 8:00 AM - 4:30 PM , Wed 10:00 AM - 6:30 PM , Thu 8:00 AM - 4:30 PM  Fees: Free   Phone: (226) 546-2808 Email: sacafs@[employer] Website: http://www.[employer].org/     SNAP application assistance  3  [facility] -   Family Wellness (AIFW) Distance: 2.21 miles      In-Person, Phone/Virtual   [address] [place] [address]  Language: Upper sorbian, Korean, English, Gujarati, Teja, Armenian, Chinese, Kinyarwanda, Macedonian, Kyrgyz  Hours: Mon - Wed 9:00 AM - 5:00 PM , Thu 12:00 PM - 6:00 PM , Fri 9:00 AM - 5:00 PM , Sun 10:30 AM - 2:00 PM Appt. Only  Fees: Free   Phone: (308) 215-9532 Email: info@sewa-aifw.org Website: https://www.sewa-aifw.org/     4  [employer]  [facility] ([facility]) Distance: 2.89 miles      In-Person, Phone/Virtual   [address] [place] [address]  Language: English, Hmong  Hours: Mon - Fri 8:30 AM - 4:30 PM  Fees: Free   Phone: (947) 861-9536 Ext.1 Email: info@[employer].org Website: https://www.[employer].org/     Soup kitchen or free meals  5  [facility] Dinner Distance: 0.83 miles      [facility]   [address] [place] [address]  Language: English  Hours: Tue 5:00 PM - 6:30 PM  Fees: Free   Phone:  (936) 622-4930 Email: admin@[employer] Website: http://www.[employer]ATIONSPLAY/     6  [facility] - Community Meal Distance: 1.64 miles      [place] [address]  Language: English  Hours: Mon 5:00 PM - 5:45 PM  Fees: Free   Phone: (115) 577-8264 Email: office@[employer] Website: http://www.[facility].[employer]          Important Numbers & Websites       Emergency Services   [address]  [place] [address]  Poison Control   (219) 529-5284  Suicide Prevention Lifeline   (368) 895-8945 (TALK)  Child Abuse Hotline   (664) 897-3128 (4-A-Child)  Sexual Assault Hotline   (118) 981-9909 (HOPE)  National Runaway Safeline   (595) 590-4874 (RUNAWAY)  All-Options Talkline   (545) 709-7119  Substance Abuse Referral   (121) 339-6235 (HELP)

## 2024-01-08 ENCOUNTER — OFFICE VISIT (OUTPATIENT)
Dept: FAMILY MEDICINE | Facility: CLINIC | Age: 44
End: 2024-01-08
Payer: COMMERCIAL

## 2024-01-08 VITALS
OXYGEN SATURATION: 99 % | SYSTOLIC BLOOD PRESSURE: 138 MMHG | TEMPERATURE: 98.7 F | WEIGHT: 283 LBS | HEART RATE: 86 BPM | DIASTOLIC BLOOD PRESSURE: 86 MMHG | BODY MASS INDEX: 41.92 KG/M2 | HEIGHT: 69 IN

## 2024-01-08 DIAGNOSIS — Z00.00 ROUTINE GENERAL MEDICAL EXAMINATION AT A HEALTH CARE FACILITY: Primary | ICD-10-CM

## 2024-01-08 DIAGNOSIS — I10 PRIMARY HYPERTENSION: ICD-10-CM

## 2024-01-08 DIAGNOSIS — Z12.4 CERVICAL CANCER SCREENING: ICD-10-CM

## 2024-01-08 DIAGNOSIS — Z11.59 NEED FOR HEPATITIS C SCREENING TEST: ICD-10-CM

## 2024-01-08 DIAGNOSIS — M75.41 IMPINGEMENT SYNDROME, SHOULDER, RIGHT: ICD-10-CM

## 2024-01-08 DIAGNOSIS — E11.9 TYPE 2 DIABETES MELLITUS WITHOUT COMPLICATION, WITHOUT LONG-TERM CURRENT USE OF INSULIN (H): ICD-10-CM

## 2024-01-08 PROBLEM — R87.810 CERVICAL HIGH RISK HPV (HUMAN PAPILLOMAVIRUS) TEST POSITIVE: Status: ACTIVE | Noted: 2023-01-26

## 2024-01-08 LAB
ALBUMIN SERPL BCG-MCNC: 4.2 G/DL (ref 3.5–5.2)
ALP SERPL-CCNC: 152 U/L (ref 40–150)
ALT SERPL W P-5'-P-CCNC: 40 U/L (ref 0–50)
ANION GAP SERPL CALCULATED.3IONS-SCNC: 15 MMOL/L (ref 7–15)
AST SERPL W P-5'-P-CCNC: 26 U/L (ref 0–45)
BILIRUB SERPL-MCNC: 0.3 MG/DL
BUN SERPL-MCNC: 14.2 MG/DL (ref 6–20)
CALCIUM SERPL-MCNC: 9.4 MG/DL (ref 8.6–10)
CHLORIDE SERPL-SCNC: 103 MMOL/L (ref 98–107)
CHOLEST SERPL-MCNC: 210 MG/DL
CREAT SERPL-MCNC: 0.75 MG/DL (ref 0.51–0.95)
CREAT UR-MCNC: 305.4 MG/DL
DEPRECATED HCO3 PLAS-SCNC: 22 MMOL/L (ref 22–29)
EGFRCR SERPLBLD CKD-EPI 2021: >90 ML/MIN/1.73M2
FASTING STATUS PATIENT QL REPORTED: YES
GLUCOSE SERPL-MCNC: 102 MG/DL (ref 70–99)
HBA1C MFR BLD: 5.9 % (ref 0–5.6)
HDLC SERPL-MCNC: 46 MG/DL
LDLC SERPL CALC-MCNC: 138 MG/DL
MICROALBUMIN UR-MCNC: 17.5 MG/L
MICROALBUMIN/CREAT UR: 5.73 MG/G CR (ref 0–25)
NONHDLC SERPL-MCNC: 164 MG/DL
POTASSIUM SERPL-SCNC: 4.2 MMOL/L (ref 3.4–5.3)
PROT SERPL-MCNC: 8.1 G/DL (ref 6.4–8.3)
SODIUM SERPL-SCNC: 140 MMOL/L (ref 135–145)
TRIGL SERPL-MCNC: 131 MG/DL

## 2024-01-08 PROCEDURE — 99396 PREV VISIT EST AGE 40-64: CPT | Performed by: PHYSICIAN ASSISTANT

## 2024-01-08 PROCEDURE — 83036 HEMOGLOBIN GLYCOSYLATED A1C: CPT | Performed by: PHYSICIAN ASSISTANT

## 2024-01-08 PROCEDURE — 82570 ASSAY OF URINE CREATININE: CPT | Performed by: PHYSICIAN ASSISTANT

## 2024-01-08 PROCEDURE — 80061 LIPID PANEL: CPT | Performed by: PHYSICIAN ASSISTANT

## 2024-01-08 PROCEDURE — 80053 COMPREHEN METABOLIC PANEL: CPT | Performed by: PHYSICIAN ASSISTANT

## 2024-01-08 PROCEDURE — 36415 COLL VENOUS BLD VENIPUNCTURE: CPT | Performed by: PHYSICIAN ASSISTANT

## 2024-01-08 PROCEDURE — 87624 HPV HI-RISK TYP POOLED RSLT: CPT | Performed by: PHYSICIAN ASSISTANT

## 2024-01-08 PROCEDURE — 99214 OFFICE O/P EST MOD 30 MIN: CPT | Mod: 25 | Performed by: PHYSICIAN ASSISTANT

## 2024-01-08 PROCEDURE — G0145 SCR C/V CYTO,THINLAYER,RESCR: HCPCS | Performed by: PHYSICIAN ASSISTANT

## 2024-01-08 PROCEDURE — 82043 UR ALBUMIN QUANTITATIVE: CPT | Performed by: PHYSICIAN ASSISTANT

## 2024-01-08 RX ORDER — LANCETS
EACH MISCELLANEOUS
Qty: 100 EACH | Refills: 6 | Status: SHIPPED | OUTPATIENT
Start: 2024-01-08

## 2024-01-08 NOTE — PROGRESS NOTES
"   SUBJECTIVE:   Mary is a 43 year old, presenting for the following:  Physical        1/8/2024     1:21 PM   Additional Questions   Roomed by CHANDLER Soto       Healthy Habits:     Getting at least 3 servings of Calcium per day:  NO    Bi-annual eye exam:  Yes    Dental care twice a year:  NO    Sleep apnea or symptoms of sleep apnea:  None    Diet:  Breakfast skipped    Frequency of exercise:  4-5 days/week    Duration of exercise:  30-45 minutes    Taking medications regularly:  Yes    Barriers to taking medications:  Cost of medication    Medication side effects:  None    Additional concerns today:  Yes  History of Present Illness       Reason for visit:  Why    She eats 2-3 servings of fruits and vegetables daily.   She is not taking prescribed medications regularly due to Cost of medication.      -She is having some right shoulder pain.   Injury years ago where she felt like she 'strained' the shoulder   Did improve - now only noticing when her arm is above her head and she is trying to do activities that require \"speed\" or \"force\"   She is still able to lift up to 200lb without any problem as long as she lifts below her shoulder height  No pain radiating down her arm   No weakness  When it is 'sore\" she will massage the front of the shoulder    -She would like to discuss her medications.   Insurance says they won't pay for ozempic and she is very worried as it has worked well for her once she got through the side effects  Did not tolerate metformin  Was started on ozempic for diabetes and was surprised with the weight loss  AT her lowest weight and feeling really girl  When she had to go off it for a short while (due to insurance) her blood sugars increased and her weight went up  She has been able to lose 6lb again but only has a month left of her current ozempic dose and is worried about gaining the weight back        Have you ever done Advance Care Planning? (For example, a Health Directive, POLST, or a " discussion with a medical provider or your loved ones about your wishes): No, advance care planning information given to patient to review.  Patient declined advance care planning discussion at this time.    Social History     Tobacco Use    Smoking status: Former     Packs/day: 0.50     Years: 2.00     Additional pack years: 0.00     Total pack years: 1.00     Types: Cigarettes     Quit date: 2022     Years since quittin.1    Smokeless tobacco: Never   Substance Use Topics    Alcohol use: No             2023     8:03 AM   Alcohol Use   Prescreen: >3 drinks/day or >7 drinks/week? No     Reviewed orders with patient.  Reviewed health maintenance and updated orders accordingly - Yes  BP Readings from Last 3 Encounters:   24 138/86   23 138/84   22 (!) 172/136    Wt Readings from Last 3 Encounters:   24 128.4 kg (283 lb)   23 135.2 kg (298 lb)   22 149.2 kg (329 lb)                    Breast Cancer Screening:  Any new diagnosis of family breast, ovarian, or bowel cancer? No    FHS-7:       2023     8:04 AM 2023    10:10 AM 2024     1:24 PM   Breast CA Risk Assessment (FHS-7)   Did any of your first-degree relatives have breast or ovarian cancer? Yes Yes Yes   Did any of your relatives have bilateral breast cancer? Unknown No Yes   Did any man in your family have breast cancer? Unknown No No   Did any woman in your family have breast and ovarian cancer? Yes No Yes   Did any woman in your family have breast cancer before age 50 y? Unknown No No   Do you have 2 or more relatives with breast and/or ovarian cancer? Unknown No Unknown   Do you have 2 or more relatives with breast and/or bowel cancer? Unknown No Unknown       Pertinent mammograms are reviewed under the imaging tab.    History of abnormal Pap smear: YES - updated in Problem List and Health Maintenance accordingly      Latest Ref Rng & Units 2023     8:58 AM 2018    12:00 AM 2011     "11:26 AM   PAP / HPV   PAP  Negative for Intraepithelial Lesion or Malignancy (NILM)      PAP (Historical)    ASC-US    HPV 16 DNA Negative Negative      HPV 18 DNA Negative Negative      Other HR HPV Negative Positive      PAP-ABSTRACT   See Scanned Document            This result is from an external source.     Reviewed and updated as needed this visit by clinical staff    Allergies  Meds              Reviewed and updated as needed this visit by Provider                     Review of Systems  CONSTITUTIONAL: NEGATIVE for fever, chills, change in weight  INTEGUMENTARU/SKIN: NEGATIVE for worrisome rashes, moles or lesions  EYES: NEGATIVE for vision changes or irritation  ENT: NEGATIVE for ear, mouth and throat problems  RESP: NEGATIVE for significant cough or SOB  BREAST: NEGATIVE for masses, tenderness or discharge  CV: NEGATIVE for chest pain, palpitations or peripheral edema  GI: NEGATIVE for nausea, abdominal pain, heartburn, or change in bowel habits  : NEGATIVE for unusual urinary or vaginal symptoms. Periods are regular.  MUSCULOSKELETAL: NEGATIVE for significant arthralgias or myalgia  NEURO: NEGATIVE for weakness, dizziness or paresthesias  PSYCHIATRIC: NEGATIVE for changes in mood or affect     OBJECTIVE:   /86   Pulse 86   Temp 98.7  F (37.1  C) (Tympanic)   Ht 1.753 m (5' 9\")   Wt 128.4 kg (283 lb)   SpO2 99%   BMI 41.79 kg/m    Physical Exam  GENERAL: healthy, alert and no distress  EYES: Eyes grossly normal to inspection, PERRL and conjunctivae and sclerae normal  HENT: ear canals and TM's normal, nose and mouth without ulcers or lesions  NECK: no adenopathy, no asymmetry, masses, or scars and thyroid normal to palpation  RESP: lungs clear to auscultation - no rales, rhonchi or wheezes  BREAST: normal without masses, tenderness or nipple discharge and no palpable axillary masses or adenopathy  CV: regular rate and rhythm, normal S1 S2, no S3 or S4, no murmur, click or rub, no peripheral " edema and peripheral pulses strong  ABDOMEN: soft, nontender, no hepatosplenomegaly, no masses and bowel sounds normal   (female): normal female external genitalia, normal urethral meatus, vaginal mucosa pink, moist, well rugated, and normal cervix/adnexa/uterus without masses or discharge  MS: no gross musculoskeletal defects noted, no edema  SKIN: no suspicious lesions or rashes  NEURO: Normal strength and tone, mentation intact and speech normal  PSYCH: mentation appears normal, affect normal/bright    Diagnostic Test Results:  pending    ASSESSMENT/PLAN:   (Z00.00) Routine general medical examination at a health care facility  (primary encounter diagnosis)  Comment:   Plan:     (Z11.59) Need for hepatitis C screening test  Comment:   Plan: Hepatitis C Screen Reflex to HCV RNA Quant and         Genotype            (E11.9) Type 2 diabetes mellitus without complication, without long-term current use of insulin (H)  Comment: repeat A1c today. Really good improvements with the ozempic dropping from above 8 to 6.2 along with weight loss. Blood pressure improved and overall physical and mental health improved. Now running into prior auth issues. Insisting on other medications first so will try what is required but am concerned that positive gains will not be able to be maintained with different medicaiton  Plan: Adult Eye  Referral, HEMOGLOBIN A1C,         Lipid panel reflex to direct LDL Non-fasting,         Albumin Random Urine Quantitative with Creat         Ratio, blood glucose monitoring (NO BRAND         SPECIFIED) meter device kit, blood glucose (NO         BRAND SPECIFIED) test strip, thin (NO BRAND         SPECIFIED) lancets, exenatide ER (BYDUREON         BCISE) 2 MG/0.85ML auto-injector, Comprehensive        metabolic panel (BMP + Alb, Alk Phos, ALT, AST,        Total. Bili, TP)            (I10) Primary hypertension  Comment: improved with weight loss  Plan: BASIC METABOLIC PANEL         "    (Z12.4) Cervical cancer screening  Comment:   Plan: Pap Screen with HPV - recommended age 30 - 65         years            (M75.41) Impingement syndrome, shoulder, right  Comment: not a consistent problem - goes through periods where she is fine. Still able to do her day to day activities without impairment as long as she is careful about positioning   Plan: would not advise surgical or even consideration of injection at this point as it is not a consistent problem  Could consider PT but she does not want ot pursue this either at this time given it is not an ongoing/daily problem  Will message if anything changes        Patient has been advised of split billing requirements and indicates understanding: Yes      COUNSELING:  Reviewed preventive health counseling, as reflected in patient instructions      BMI:   Estimated body mass index is 41.79 kg/m  as calculated from the following:    Height as of this encounter: 1.753 m (5' 9\").    Weight as of this encounter: 128.4 kg (283 lb).   Weight management plan: successful weight loss with ozempic      She reports that she quit smoking about 13 months ago. Her smoking use included cigarettes. She has a 1 pack-year smoking history. She has never used smokeless tobacco.        Riri Meyers PA-C  [facility]  "

## 2024-01-08 NOTE — COMMUNITY RESOURCES LIST (ENGLISH)
01/08/2024   [facility]  N/A  For questions about this resource list or additional care needs, please contact your primary care clinic or care manager.  Phone: [phone]   Email: N/A   Address: [facility] [place] [address]   Hours: N/A        Food and Nutrition       Food pantry  1  [facility] Distance: 1.04 miles      [facility] [place] [address]  Language: English, Argentine  Hours: Wed 6:30 PM - 7:00 PM  Fees: Free   Phone: (495) 982-2859 Email: [email or web address]@[email or web address] Website: http://[facility].Democracy.com/     2  [employer] Distance: 1.61 miles      [place] [address]  Language: English, Argentine  Hours: Mon - Tue 8:00 AM - 4:30 PM , Wed 10:00 AM - 6:30 PM , Thu 8:00 AM - 4:30 PM  Fees: Free   Phone: (483) 669-4666 Email: sacafs@[employer] Website: http://www.KushAdRoll.org/     SNAP application assistance  3  [facility] -   Family Wellness (AIFW) Distance: 2.21 miles      In-Person, Phone/Virtual   [address] [place] [address]  Language: Croatian, Thai, English, Gujarati, Teja, Swedish, Bengali, Icelandic, Kiswahili, Georgian  Hours: Mon - Wed 9:00 AM - 5:00 PM , Thu 12:00 PM - 6:00 PM , Fri 9:00 AM - 5:00 PM , Sun 10:30 AM - 2:00 PM Appt. Only  Fees: Free   Phone: (832) 228-7212 Email: info@sewa-aifw.org Website: https://www.sewa-aifw.org/     4  [employer]  [facility] (IOC) Distance: 2.89 miles      In-Person, Phone/Virtual   [address] [place] [address]  Language: English, Hmong  Hours: Mon - Fri 8:30 AM - 4:30 PM  Fees: Free   Phone: (325) 837-3737 Ext.1 Email: info@Extended [employer] Information Network.org Website: https://www.Extended Care Information Network.org/     Soup kitchen or free meals  5  [facility] Distance: 0.83 miles      [facility]   [address] [place] [address]  Language: English  Hours: Tue 5:00 PM - 6:30 PM  Fees: Free   Phone:  (440) 490-1005 Email: admin@[employer] Website: http://www.[employer]/     6  [facility] - Community Meal Distance: 1.64 miles      [place] [address]  Language: English  Hours: Mon 5:00 PM - 5:45 PM  Fees: Free   Phone: (312) 260-2319 Email: office@[employer] Website: http://www.Critical access hospitaliCrimefighter.Exalt Communications          Important Numbers & Websites       Emergency Services   [address]  [place] [address]  Poison Control   (216) 979-7406  Suicide Prevention Lifeline   (667) 784-4287 (TALK)  Child Abuse Hotline   (388) 122-6281 (4-A-Child)  Sexual Assault Hotline   (659) 288-2889 (HOPE)  National Runaway Safeline   (664) 251-5074 (RUNAWAY)  All-Options Talkline   (456) 987-4072  Substance Abuse Referral   (108) 564-5950 (HELP)

## 2024-01-08 NOTE — COMMUNITY RESOURCES LIST (ENGLISH)
01/08/2024   [facility]  N/A  For questions about this resource list or additional care needs, please contact your primary care clinic or care manager.  Phone: [phone]   Email: N/A   Address: [facility] [place] [address]   Hours: N/A        Food and Nutrition       Food pantry  1  [facility] Distance: 1.04 miles      [facility] [place] [address]  Language: English, New Zealander  Hours: Wed 6:30 PM - 7:00 PM  Fees: Free   Phone: (198) 754-6485 Email: ohbchurch@[email or web address] Website: http://[facility].Acceptd/     2  [employer] and [employer] Distance: 1.61 miles      [place] [address]  Language: English, New Zealander  Hours: Mon - Tue 8:00 AM - 4:30 PM , Wed 10:00 AM - 6:30 PM , Thu 8:00 AM - 4:30 PM  Fees: Free   Phone: (179) 240-3597 Email: sacafs@[employer] Website: http://www.Crop[employer] TechnologiesshLessThan3.org/     SNAP application assistance  3  [facility] -   Family Wellness (AIFW) Distance: 2.21 miles      In-Person, Phone/Virtual   [address] [place] [address]  Language: Sami, Romanian, English, Gujarati, Teja, Tajik, Bengali, Khmer, Hungarian, Estonian  Hours: Mon - Wed 9:00 AM - 5:00 PM , Thu 12:00 PM - 6:00 PM , Fri 9:00 AM - 5:00 PM , Sun 10:30 AM - 2:00 PM Appt. Only  Fees: Free   Phone: (773) 396-1716 Email: info@sewa-aifw.org Website: https://www.sewa-aifw.org/     4  [employer]  Imm[facility] (IOC) Distance: 2.89 miles      In-Person, Phone/Virtual   [address] [place] [address]  Language: English, Hmong  Hours: Mon - Fri 8:30 AM - 4:30 PM  Fees: Free   Phone: (835) 604-9392 Ext.1 Email: info@Program[employer].org Website: https://www.Programeter.org/     Soup kitchen or free meals  5  [facility] Dinner Distance: 0.83 miles      [facility]   [address] [place] [address]  Language: English  Hours: Tue 5:00 PM - 6:30 PM  Fees: Free   Phone:  (600) 553-5291 Email: admin@.com Website: http://www..com/     6  [facility] - Community Meal Distance: 1.64 miles      [place] [address]  Language: English  Hours: Mon 5:00 PM - 5:45 PM  Fees: Free   Phone: (407) 596-4794 Email: office@[employer] Website: http://www.[facility] Center[facility].PF Changs          Important Numbers & Websites       Emergency Services   [address]  [place] [address]  Poison Control   (392) 545-6587  Suicide Prevention Lifeline   (886) 375-2715 (TALK)  Child Abuse Hotline   (966) 733-3781 (4-A-Child)  Sexual Assault Hotline   (375) 714-2453 (HOPE)  National Runaway Safeline   (840) 310-2455 (RUNAWAY)  All-Options Talkline   (685) 325-2910  Substance Abuse Referral   (723) 710-9623 (HELP)

## 2024-01-09 ENCOUNTER — TELEPHONE (OUTPATIENT)
Dept: FAMILY MEDICINE | Facility: CLINIC | Age: 44
End: 2024-01-09

## 2024-01-09 NOTE — TELEPHONE ENCOUNTER
Patient called and says she is getting notifications to do a follow up with Riri and says its probable from her lab work.  She asking for clarification on why she's be notified to do a follow up and what the result of her blood work is.          Swapna Mckeon on 1/9/2024 at 11:59 AM

## 2024-01-09 NOTE — TELEPHONE ENCOUNTER
I don't know why or who is telling her to follow up. I did a quick review of her labs and there is nothing alarming that she would need to see me for again. I will send out a full result note by tomorrow when I get a chance but I don't think it was her labs that were flagging the call. Tell her to not worry about it as at the moment I'm not sure where it is coming from    Riri Meyers PA-C

## 2024-01-10 LAB
BKR LAB AP GYN ADEQUACY: NORMAL
BKR LAB AP GYN INTERPRETATION: NORMAL
BKR LAB AP HPV REFLEX: NORMAL
BKR LAB AP PREVIOUS ABNL DX: NORMAL
BKR LAB AP PREVIOUS ABNORMAL: NORMAL
PATH REPORT.COMMENTS IMP SPEC: NORMAL
PATH REPORT.COMMENTS IMP SPEC: NORMAL
PATH REPORT.RELEVANT HX SPEC: NORMAL

## 2024-01-12 ENCOUNTER — PATIENT OUTREACH (OUTPATIENT)
Dept: FAMILY MEDICINE | Facility: CLINIC | Age: 44
End: 2024-01-12
Payer: COMMERCIAL

## 2024-01-24 ENCOUNTER — OFFICE VISIT (OUTPATIENT)
Dept: OPHTHALMOLOGY | Facility: CLINIC | Age: 44
End: 2024-01-24
Attending: PHYSICIAN ASSISTANT
Payer: COMMERCIAL

## 2024-01-24 DIAGNOSIS — G93.2 IIH (IDIOPATHIC INTRACRANIAL HYPERTENSION): ICD-10-CM

## 2024-01-24 DIAGNOSIS — E11.9 TYPE 2 DIABETES MELLITUS WITHOUT COMPLICATION, WITHOUT LONG-TERM CURRENT USE OF INSULIN (H): ICD-10-CM

## 2024-01-24 DIAGNOSIS — H52.4 PRESBYOPIA: ICD-10-CM

## 2024-01-24 DIAGNOSIS — Z01.00 EXAMINATION OF EYES AND VISION: Primary | ICD-10-CM

## 2024-01-24 PROCEDURE — 92015 DETERMINE REFRACTIVE STATE: CPT | Performed by: OPHTHALMOLOGY

## 2024-01-24 PROCEDURE — 92004 COMPRE OPH EXAM NEW PT 1/>: CPT | Performed by: OPHTHALMOLOGY

## 2024-01-24 ASSESSMENT — TONOMETRY
IOP_METHOD: APPLANATION
OS_IOP_MMHG: 20
OD_IOP_MMHG: 19

## 2024-01-24 ASSESSMENT — REFRACTION_MANIFEST
OS_AXIS: 108
OD_ADD: +1.00
OD_SPHERE: -2.00
OS_CYLINDER: +0.50
OS_ADD: +1.00
OS_SPHERE: -2.00
OD_AXIS: 108
OD_CYLINDER: +0.50

## 2024-01-24 ASSESSMENT — CONF VISUAL FIELD
OS_NORMAL: 1
OD_INFERIOR_TEMPORAL_RESTRICTION: 0
OD_SUPERIOR_NASAL_RESTRICTION: 0
OD_INFERIOR_NASAL_RESTRICTION: 0
OS_INFERIOR_NASAL_RESTRICTION: 0
OS_INFERIOR_TEMPORAL_RESTRICTION: 0
METHOD: COUNTING FINGERS
OS_SUPERIOR_TEMPORAL_RESTRICTION: 0
OS_SUPERIOR_NASAL_RESTRICTION: 0
OD_NORMAL: 1
OD_SUPERIOR_TEMPORAL_RESTRICTION: 0

## 2024-01-24 ASSESSMENT — REFRACTION_WEARINGRX
OS_SPHERE: -1.50
OS_AXIS: 108
OD_CYLINDER: +0.75
OD_SPHERE: -1.75
SPECS_TYPE: PAL
OD_AXIS: 108
OS_CYLINDER: +0.50

## 2024-01-24 ASSESSMENT — VISUAL ACUITY
METHOD: SNELLEN - LINEAR
OS_CC: 20/20
OD_CC: 20/20
CORRECTION_TYPE: GLASSES

## 2024-01-24 ASSESSMENT — SLIT LAMP EXAM - LIDS
COMMENTS: 1+ DERMATOCHALASIS
COMMENTS: 1+ DERMATOCHALASIS

## 2024-01-24 ASSESSMENT — CUP TO DISC RATIO
OS_RATIO: 0.0
OD_RATIO: 0.0

## 2024-01-24 ASSESSMENT — EXTERNAL EXAM - LEFT EYE: OS_EXAM: NORMAL

## 2024-01-24 ASSESSMENT — EXTERNAL EXAM - RIGHT EYE: OD_EXAM: NORMAL

## 2024-01-24 NOTE — PATIENT INSTRUCTIONS
"Glasses prescription given - optional    May use artificial tears up to four times a day (like Refresh Optive, Systane Balance, or TheraTears. Avoid \"get the red out\" drops and generic artifical tears).      Call in September 2024 for an appointment in January 2025 for Complete Exam    Dr. Navas [phone]      Patient Education   Diabetes weakens the blood vessels all over the body, including the eyes. Damage to the blood vessels in the eyes can cause swelling or bleeding into part of the eye (called the retina). This is called diabetic retinopathy (MEHRAN-tin--pu-thee). If not treated, this disease can cause vision loss or blindness.   Symptoms may include blurred or distorted vision, but many people have no symptoms. It's important to see your eye doctor regularly to check for problems.   Early treatment and good control can help protect your vision. Here are the things you can do to help prevent vision loss:      1. Keep your blood sugar levels under tight control.      2. Bring high blood pressure under control.      3. No smoking.      4. Have yearly dilated eye exams.         "
Rm Jimenez (BRAN)  Podiatric Medicine and Surgery  [address] [place] [address]  Phone: [phone]  Fax: [phone]  Follow Up Time:

## 2024-01-24 NOTE — PROGRESS NOTES
" Current Eye Medications:  None     Subjective:  Diabetic eye exam. Vision is doing well both eyes in the distance with glasses. Takes glasses off to read and that works well. No eye pain or discomfort in either eye. Eyes get red when patient showers, doesn't last to long after.  history of pseudotumor cerebri 20 years ago. Had vision loss for 1 month. Had spinal tap to resolve.  Diabetic    Lab Results   Component Value Date    A1C 5.9 01/08/2024    A1C 6.2 01/26/2023    A1C 6.9 10/11/2021    A1C 8.0 06/15/2021    A1C 7.3 06/17/2019    A1C 6.9 08/11/2018    A1C 8.2 12/17/2017    A1C 6.5 12/27/2012     IIH treated at [facility].  Diamox x 1 yrs; initially had visual field loss, has resolved.  Aunt with [facility].  On Ozempic.     Objective:  See Ophthalmology Exam.       Assessment:  Baseline eye exam in patient with diabetes and hx of IHH.  No optic nerve pallor.  No diabetic retinopathy.      ICD-10-CM    1. Examination of eyes and vision  Z01.00       2. Presbyopia  H52.4       3. Type 2 diabetes mellitus without complication, without long-term current use of insulin (H)  E11.9 Adult Eye  Referral      4. History of IIH (idiopathic intracranial hypertension)  G93.2            Plan:  Glasses prescription given - optional bifocal    May use artificial tears up to four times a day (like Refresh Optive, Systane Balance, or TheraTears. Avoid \"get the red out\" drops and generic artifical tears).      Call in September 2024 for an appointment in January 2025 for Complete Exam    Dr. Navas [phone]         "

## 2024-01-24 NOTE — LETTER
"    1/24/2024         RE: Mary Dixon  [address] [facility] [place] [address]        Dear Colleague,    Thank you for referring your patient, Mary Dixon, to the [facility]. Please see a copy of my visit note below.     Current Eye Medications:  None     Subjective:  Diabetic eye exam. Vision is doing well both eyes in the distance with glasses. Takes glasses off to read and that works well. No eye pain or discomfort in either eye. Eyes get red when patient showers, doesn't last to long after.  history of pseudotumor cerebri 20 years ago. Had vision loss for 1 month. Had spinal tap to resolve.  Diabetic    Lab Results   Component Value Date    A1C 5.9 01/08/2024    A1C 6.2 01/26/2023    A1C 6.9 10/11/2021    A1C 8.0 06/15/2021    A1C 7.3 06/17/2019    A1C 6.9 08/11/2018    A1C 8.2 12/17/2017    A1C 6.5 12/27/2012     [facility].  Diamox x 1 yrs; initially had visual field loss, has resolved.  Aunt with [facility].  On Ozempic.     Objective:  See Ophthalmology Exam.       Assessment:  Baseline eye exam in patient with diabetes and hx of H.  No optic nerve pallor.  No diabetic retinopathy.      ICD-10-CM    1. Examination of eyes and vision  Z01.00       2. Presbyopia  H52.4       3. Type 2 diabetes mellitus without complication, without long-term current use of insulin (H)  E11.9 Adult Eye  Referral      4. History of IIH (idiopathic intracranial hypertension)  G93.2            Plan:  Glasses prescription given - optional bifocal    May use artificial tears up to four times a day (like Refresh Optive, Systane Balance, or TheraTears. Avoid \"get the red out\" drops and generic artifical tears).      Call in September 2024 for an appointment in January 2025 for Complete Exam    Dr. Navas [phone]         Again, thank you for allowing me to participate in the care of your patient.        Sincerely,        Donell Navas MD  "

## 2024-01-29 DIAGNOSIS — E11.65 TYPE 2 DIABETES MELLITUS WITH HYPERGLYCEMIA, WITHOUT LONG-TERM CURRENT USE OF INSULIN (H): ICD-10-CM

## 2024-01-29 RX ORDER — SEMAGLUTIDE 2.68 MG/ML
2 INJECTION, SOLUTION SUBCUTANEOUS
Qty: 9 ML | Refills: 3 | OUTPATIENT
Start: 2024-01-29

## 2024-02-05 ENCOUNTER — APPOINTMENT (OUTPATIENT)
Dept: OPTOMETRY | Facility: CLINIC | Age: 44
End: 2024-02-05
Payer: COMMERCIAL

## 2024-02-05 PROCEDURE — 92340 FIT SPECTACLES MONOFOCAL: CPT | Performed by: OPTOMETRIST

## 2024-02-12 ENCOUNTER — TELEPHONE (OUTPATIENT)
Dept: FAMILY MEDICINE | Facility: CLINIC | Age: 44
End: 2024-02-12
Payer: COMMERCIAL

## 2024-02-12 DIAGNOSIS — E11.65 TYPE 2 DIABETES MELLITUS WITH HYPERGLYCEMIA, WITHOUT LONG-TERM CURRENT USE OF INSULIN (H): Primary | ICD-10-CM

## 2024-02-28 ENCOUNTER — MYC MEDICAL ADVICE (OUTPATIENT)
Dept: FAMILY MEDICINE | Facility: CLINIC | Age: 44
End: 2024-02-28
Payer: COMMERCIAL

## 2024-02-28 DIAGNOSIS — E11.65 TYPE 2 DIABETES MELLITUS WITH HYPERGLYCEMIA, WITHOUT LONG-TERM CURRENT USE OF INSULIN (H): Primary | ICD-10-CM

## 2024-02-28 DIAGNOSIS — E66.01 MORBID OBESITY (H): ICD-10-CM

## 2024-03-13 ENCOUNTER — E-VISIT (OUTPATIENT)
Dept: FAMILY MEDICINE | Facility: CLINIC | Age: 44
End: 2024-03-13
Payer: COMMERCIAL

## 2024-03-13 DIAGNOSIS — J02.9 SORE THROAT: Primary | ICD-10-CM

## 2024-03-13 PROCEDURE — 99207 PR NON-BILLABLE SERV PER CHARTING: CPT | Performed by: PHYSICIAN ASSISTANT

## 2024-03-14 ENCOUNTER — LAB (OUTPATIENT)
Dept: LAB | Facility: CLINIC | Age: 44
End: 2024-03-14
Attending: PHYSICIAN ASSISTANT
Payer: COMMERCIAL

## 2024-03-14 ENCOUNTER — TELEPHONE (OUTPATIENT)
Dept: FAMILY MEDICINE | Facility: CLINIC | Age: 44
End: 2024-03-14

## 2024-03-14 ENCOUNTER — MYC MEDICAL ADVICE (OUTPATIENT)
Dept: FAMILY MEDICINE | Facility: CLINIC | Age: 44
End: 2024-03-14

## 2024-03-14 DIAGNOSIS — J02.9 SORE THROAT: ICD-10-CM

## 2024-03-14 DIAGNOSIS — J02.0 STREP THROAT: Primary | ICD-10-CM

## 2024-03-14 LAB
DEPRECATED S PYO AG THROAT QL EIA: POSITIVE
FLUAV AG SPEC QL IA: NEGATIVE
FLUBV AG SPEC QL IA: NEGATIVE
SARS-COV-2 RNA RESP QL NAA+PROBE: NEGATIVE

## 2024-03-14 PROCEDURE — 87880 STREP A ASSAY W/OPTIC: CPT

## 2024-03-14 PROCEDURE — 87635 SARS-COV-2 COVID-19 AMP PRB: CPT

## 2024-03-14 PROCEDURE — 87804 INFLUENZA ASSAY W/OPTIC: CPT

## 2024-03-14 RX ORDER — PENICILLIN V POTASSIUM 500 MG/1
500 TABLET, FILM COATED ORAL 2 TIMES DAILY
Qty: 20 TABLET | Refills: 0 | Status: SHIPPED | OUTPATIENT
Start: 2024-03-14 | End: 2024-03-24

## 2024-03-14 NOTE — TELEPHONE ENCOUNTER
Patient called regarding their positive strep test.   Patient is hoping to get started on abx.   Denied allergies to abx.  Pharmacy is the [facility] pharmacy.    Routing to provider of the day.  Vivienne Robb RN on 3/14/2024 at 4:33 PM

## 2024-04-12 ENCOUNTER — MYC MEDICAL ADVICE (OUTPATIENT)
Dept: FAMILY MEDICINE | Facility: CLINIC | Age: 44
End: 2024-04-12
Payer: COMMERCIAL

## 2024-04-12 DIAGNOSIS — E66.01 MORBID OBESITY (H): ICD-10-CM

## 2024-04-12 DIAGNOSIS — E11.65 TYPE 2 DIABETES MELLITUS WITH HYPERGLYCEMIA, WITHOUT LONG-TERM CURRENT USE OF INSULIN (H): ICD-10-CM

## 2024-04-12 DIAGNOSIS — L29.9 ITCHING: ICD-10-CM

## 2024-04-12 DIAGNOSIS — F41.9 ANXIETY: Primary | ICD-10-CM

## 2024-04-12 RX ORDER — HYDROXYZINE HYDROCHLORIDE 25 MG/1
25-50 TABLET, FILM COATED ORAL 3 TIMES DAILY PRN
Qty: 60 TABLET | Refills: 0 | Status: SHIPPED | OUTPATIENT
Start: 2024-04-12

## 2024-05-07 ENCOUNTER — TRANSFERRED RECORDS (OUTPATIENT)
Dept: MULTI SPECIALTY CLINIC | Facility: CLINIC | Age: 44
End: 2024-05-07

## 2024-05-07 LAB — PAP SMEAR - HIM PATIENT REPORTED: NEGATIVE

## 2024-07-17 NOTE — TELEPHONE ENCOUNTER
FYI to provider - Patient is lost to pap tracking follow-up. Attempts to contact pt have been made per reminder process and there has been no reply and/or no appt scheduled. Contact hx listed below.     2008, 2009 NIL paps  10/4/10 ASCUS, neg HPV  11/1/11 ASCUS, neg HPV  11/17/15 NIL per Care Everywhere  1/16/18 NIL per Care Everywhere  1/26/23 NIL pap, +HR HPV (not 16/18). Plan: cotest in 1 year  1/8/24 NIL pap, +HR HPV (not 16/18). Plan: colposcopy due before 4/8/24 1/12/24 Pt notified - hung up on RN  3/7/24 Reminder kailey  4/5/24 [place] not done. Tracking updated for 6 mo colp/pap due 7/8/24.    6/19/24 Reminder mychart -- read  7/17/24 Lost to follow-up for pap tracking     Lizzy Reyes RN BSN, Pap Tracking

## 2024-08-22 ENCOUNTER — MYC REFILL (OUTPATIENT)
Dept: FAMILY MEDICINE | Facility: CLINIC | Age: 44
End: 2024-08-22
Payer: COMMERCIAL

## 2024-08-22 DIAGNOSIS — E66.01 MORBID OBESITY (H): ICD-10-CM

## 2024-08-22 DIAGNOSIS — E11.65 TYPE 2 DIABETES MELLITUS WITH HYPERGLYCEMIA, WITHOUT LONG-TERM CURRENT USE OF INSULIN (H): ICD-10-CM

## 2024-08-25 ENCOUNTER — HEALTH MAINTENANCE LETTER (OUTPATIENT)
Age: 44
End: 2024-08-25

## 2024-12-09 ENCOUNTER — PATIENT OUTREACH (OUTPATIENT)
Dept: CARE COORDINATION | Facility: CLINIC | Age: 44
End: 2024-12-09
Payer: COMMERCIAL

## 2024-12-23 ENCOUNTER — PATIENT OUTREACH (OUTPATIENT)
Dept: CARE COORDINATION | Facility: CLINIC | Age: 44
End: 2024-12-23
Payer: COMMERCIAL

## 2025-01-14 ENCOUNTER — ANCILLARY PROCEDURE (OUTPATIENT)
Dept: MAMMOGRAPHY | Facility: CLINIC | Age: 45
End: 2025-01-14
Attending: PHYSICIAN ASSISTANT
Payer: COMMERCIAL

## 2025-01-14 DIAGNOSIS — Z12.31 VISIT FOR SCREENING MAMMOGRAM: ICD-10-CM

## 2025-01-14 PROCEDURE — 77067 SCR MAMMO BI INCL CAD: CPT | Mod: TC | Performed by: RADIOLOGY

## 2025-01-14 PROCEDURE — 77063 BREAST TOMOSYNTHESIS BI: CPT | Mod: TC | Performed by: RADIOLOGY

## 2025-01-15 ENCOUNTER — MYC MEDICAL ADVICE (OUTPATIENT)
Dept: FAMILY MEDICINE | Facility: CLINIC | Age: 45
End: 2025-01-15
Payer: COMMERCIAL

## 2025-01-16 SDOH — HEALTH STABILITY: PHYSICAL HEALTH: ON AVERAGE, HOW MANY DAYS PER WEEK DO YOU ENGAGE IN MODERATE TO STRENUOUS EXERCISE (LIKE A BRISK WALK)?: 3 DAYS

## 2025-01-16 SDOH — HEALTH STABILITY: PHYSICAL HEALTH: ON AVERAGE, HOW MANY MINUTES DO YOU ENGAGE IN EXERCISE AT THIS LEVEL?: 30 MIN

## 2025-01-16 ASSESSMENT — SOCIAL DETERMINANTS OF HEALTH (SDOH): HOW OFTEN DO YOU GET TOGETHER WITH FRIENDS OR RELATIVES?: TWICE A WEEK

## 2025-01-21 ENCOUNTER — OFFICE VISIT (OUTPATIENT)
Dept: FAMILY MEDICINE | Facility: CLINIC | Age: 45
End: 2025-01-21
Payer: COMMERCIAL

## 2025-01-21 ENCOUNTER — ANCILLARY PROCEDURE (OUTPATIENT)
Dept: MAMMOGRAPHY | Facility: CLINIC | Age: 45
End: 2025-01-21
Attending: PHYSICIAN ASSISTANT
Payer: COMMERCIAL

## 2025-01-21 ENCOUNTER — APPOINTMENT (OUTPATIENT)
Dept: LAB | Facility: CLINIC | Age: 45
End: 2025-01-21
Payer: COMMERCIAL

## 2025-01-21 VITALS
HEART RATE: 100 BPM | HEIGHT: 69 IN | TEMPERATURE: 97.1 F | BODY MASS INDEX: 42.65 KG/M2 | DIASTOLIC BLOOD PRESSURE: 76 MMHG | OXYGEN SATURATION: 97 % | WEIGHT: 288 LBS | SYSTOLIC BLOOD PRESSURE: 128 MMHG

## 2025-01-21 DIAGNOSIS — Z78.0 ASYMPTOMATIC MENOPAUSAL STATE: ICD-10-CM

## 2025-01-21 DIAGNOSIS — R92.8 ABNORMAL MAMMOGRAM: ICD-10-CM

## 2025-01-21 DIAGNOSIS — N95.0 PMB (POSTMENOPAUSAL BLEEDING): ICD-10-CM

## 2025-01-21 DIAGNOSIS — F41.9 ANXIETY: ICD-10-CM

## 2025-01-21 DIAGNOSIS — Z13.6 CARDIOVASCULAR SCREENING; LDL GOAL LESS THAN 160: ICD-10-CM

## 2025-01-21 DIAGNOSIS — I10 PRIMARY HYPERTENSION: ICD-10-CM

## 2025-01-21 DIAGNOSIS — E11.9 TYPE 2 DIABETES MELLITUS WITHOUT COMPLICATION, WITHOUT LONG-TERM CURRENT USE OF INSULIN (H): ICD-10-CM

## 2025-01-21 DIAGNOSIS — Z11.3 SCREEN FOR STD (SEXUALLY TRANSMITTED DISEASE): ICD-10-CM

## 2025-01-21 DIAGNOSIS — E66.01 MORBID OBESITY (H): ICD-10-CM

## 2025-01-21 DIAGNOSIS — E55.9 VITAMIN D DEFICIENCY: ICD-10-CM

## 2025-01-21 DIAGNOSIS — Z00.00 ROUTINE GENERAL MEDICAL EXAMINATION AT A HEALTH CARE FACILITY: Primary | ICD-10-CM

## 2025-01-21 LAB
ANION GAP SERPL CALCULATED.3IONS-SCNC: 14 MMOL/L (ref 7–15)
BUN SERPL-MCNC: 12.1 MG/DL (ref 6–20)
CALCIUM SERPL-MCNC: 9.5 MG/DL (ref 8.8–10.4)
CHLORIDE SERPL-SCNC: 103 MMOL/L (ref 98–107)
CHOLEST SERPL-MCNC: 182 MG/DL
CREAT SERPL-MCNC: 0.93 MG/DL (ref 0.51–0.95)
CREAT UR-MCNC: 366.7 MG/DL
EGFRCR SERPLBLD CKD-EPI 2021: 78 ML/MIN/1.73M2
EST. AVERAGE GLUCOSE BLD GHB EST-MCNC: 137 MG/DL
FASTING STATUS PATIENT QL REPORTED: YES
FASTING STATUS PATIENT QL REPORTED: YES
GLUCOSE SERPL-MCNC: 200 MG/DL (ref 70–99)
HBA1C MFR BLD: 6.4 % (ref 0–5.6)
HCO3 SERPL-SCNC: 22 MMOL/L (ref 22–29)
HDLC SERPL-MCNC: 38 MG/DL
LDLC SERPL CALC-MCNC: 95 MG/DL
MICROALBUMIN UR-MCNC: 29 MG/L
MICROALBUMIN/CREAT UR: 7.91 MG/G CR (ref 0–25)
NONHDLC SERPL-MCNC: 144 MG/DL
POTASSIUM SERPL-SCNC: 3.7 MMOL/L (ref 3.4–5.3)
SODIUM SERPL-SCNC: 139 MMOL/L (ref 135–145)
TRIGL SERPL-MCNC: 246 MG/DL
TSH SERPL DL<=0.005 MIU/L-ACNC: 2.81 UIU/ML (ref 0.3–4.2)

## 2025-01-21 PROCEDURE — 82043 UR ALBUMIN QUANTITATIVE: CPT | Performed by: PHYSICIAN ASSISTANT

## 2025-01-21 PROCEDURE — 36415 COLL VENOUS BLD VENIPUNCTURE: CPT | Performed by: PHYSICIAN ASSISTANT

## 2025-01-21 PROCEDURE — 80061 LIPID PANEL: CPT | Performed by: PHYSICIAN ASSISTANT

## 2025-01-21 PROCEDURE — 82570 ASSAY OF URINE CREATININE: CPT | Performed by: PHYSICIAN ASSISTANT

## 2025-01-21 PROCEDURE — G0279 TOMOSYNTHESIS, MAMMO: HCPCS | Performed by: RADIOLOGY

## 2025-01-21 PROCEDURE — 99396 PREV VISIT EST AGE 40-64: CPT | Performed by: PHYSICIAN ASSISTANT

## 2025-01-21 PROCEDURE — 83036 HEMOGLOBIN GLYCOSYLATED A1C: CPT | Performed by: PHYSICIAN ASSISTANT

## 2025-01-21 PROCEDURE — 82306 VITAMIN D 25 HYDROXY: CPT | Performed by: PHYSICIAN ASSISTANT

## 2025-01-21 PROCEDURE — 99207 PR FOOT EXAM NO CHARGE: CPT | Performed by: PHYSICIAN ASSISTANT

## 2025-01-21 PROCEDURE — 86780 TREPONEMA PALLIDUM: CPT | Performed by: PHYSICIAN ASSISTANT

## 2025-01-21 PROCEDURE — 99213 OFFICE O/P EST LOW 20 MIN: CPT | Mod: 25 | Performed by: PHYSICIAN ASSISTANT

## 2025-01-21 PROCEDURE — 87491 CHLMYD TRACH DNA AMP PROBE: CPT | Performed by: PHYSICIAN ASSISTANT

## 2025-01-21 PROCEDURE — 77065 DX MAMMO INCL CAD UNI: CPT | Mod: LT | Performed by: RADIOLOGY

## 2025-01-21 PROCEDURE — 87591 N.GONORRHOEAE DNA AMP PROB: CPT | Performed by: PHYSICIAN ASSISTANT

## 2025-01-21 PROCEDURE — 80048 BASIC METABOLIC PNL TOTAL CA: CPT | Performed by: PHYSICIAN ASSISTANT

## 2025-01-21 PROCEDURE — 84443 ASSAY THYROID STIM HORMONE: CPT | Performed by: PHYSICIAN ASSISTANT

## 2025-01-21 RX ORDER — HYDROXYZINE HYDROCHLORIDE 25 MG/1
25-50 TABLET, FILM COATED ORAL 3 TIMES DAILY PRN
Qty: 60 TABLET | Refills: 4 | Status: SHIPPED | OUTPATIENT
Start: 2025-01-21

## 2025-01-21 RX ORDER — PROPRANOLOL HCL 20 MG
TABLET ORAL
Qty: 270 TABLET | Refills: 3 | Status: SHIPPED | OUTPATIENT
Start: 2025-01-21

## 2025-01-21 NOTE — PATIENT INSTRUCTIONS
Patient Education   Preventive Care Advice   This is general advice given by our system to help you stay healthy. However, your care team may have specific advice just for you. Please talk to your care team about your preventive care needs.  Nutrition  Eat 5 or more servings of fruits and vegetables each day.  Try wheat bread, brown rice and whole grain pasta (instead of white bread, rice, and pasta).  Get enough calcium and vitamin D. Check the label on foods and aim for 100% of the RDA (recommended daily allowance).  Lifestyle  Exercise at least 150 minutes each week  (30 minutes a day, 5 days a week).  Do muscle strengthening activities 2 days a week. These help control your weight and prevent disease.  No smoking.  Wear sunscreen to prevent skin cancer.  Have a dental exam and cleaning every 6 months.  Yearly exams  See your health care team every year to talk about:  Any changes in your health.  Any medicines your care team has prescribed.  Preventive care, family planning, and ways to prevent chronic diseases.  Shots (vaccines)   HPV shots (up to age 26), if you've never had them before.  Hepatitis B shots (up to age 59), if you've never had them before.  COVID-19 shot: Get this shot when it's due.  Flu shot: Get a flu shot every year.  Tetanus shot: Get a tetanus shot every 10 years.  Pneumococcal, hepatitis A, and RSV shots: Ask your care team if you need these based on your risk.  Shingles shot (for age 50 and up)  General health tests  Diabetes screening:  Starting at age 35, Get screened for diabetes at least every 3 years.  If you are younger than age 35, ask your care team if you should be screened for diabetes.  Cholesterol test: At age 39, start having a cholesterol test every 5 years, or more often if advised.  Bone density scan (DEXA): At age 50, ask your care team if you should have this scan for osteoporosis (brittle bones).  Hepatitis C: Get tested at least once in your life.  STIs (sexually  transmitted infections)  Before age 24: Ask your care team if you should be screened for STIs.  After age 24: Get screened for STIs if you're at risk. You are at risk for STIs (including HIV) if:  You are sexually active with more than one person.  You don't use condoms every time.  You or a partner was diagnosed with a sexually transmitted infection.  If you are at risk for HIV, ask about PrEP medicine to prevent HIV.  Get tested for HIV at least once in your life, whether you are at risk for HIV or not.  Cancer screening tests  Cervical cancer screening: If you have a cervix, begin getting regular cervical cancer screening tests starting at age 21.  Breast cancer scan (mammogram): If you've ever had breasts, begin having regular mammograms starting at age 40. This is a scan to check for breast cancer.  Colon cancer screening: It is important to start screening for colon cancer at age 45.  Have a colonoscopy test every 10 years (or more often if you're at risk) Or, ask your provider about stool tests like a FIT test every year or Cologuard test every 3 years.  To learn more about your testing options, visit:   .  For help making a decision, visit:   https://bit.ly/uy82787.  Prostate cancer screening test: If you have a prostate, ask your care team if a prostate cancer screening test (PSA) at age 55 is right for you.  Lung cancer screening: If you are a current or former smoker ages 50 to 80, ask your care team if ongoing lung cancer screenings are right for you.  For informational purposes only. Not to replace the advice of your health care provider. Copyright   2023 [facility]. All rights reserved. Clinically reviewed by the [facility] Transitions Program. [employer] [id number] - REV 01/24.  Learning About Stress  What is stress?     Stress is your body's response to a hard situation. Your body can have a physical, emotional, or mental response. Stress is a fact of life for most people, and it  affects everyone differently. What causes stress for you may not be stressful for someone else.  A lot of things can cause stress. You may feel stress when you go on a job interview, take a test, or run a race. This kind of short-term stress is normal and even useful. It can help you if you need to work hard or react quickly. For example, stress can help you finish an important job on time.  Long-term stress is caused by ongoing stressful situations or events. Examples of long-term stress include long-term health problems, ongoing problems at work, or conflicts in your family. Long-term stress can harm your health.  How does stress affect your health?  When you are stressed, your body responds as though you are in danger. It makes hormones that speed up your heart, make you breathe faster, and give you a burst of energy. This is called the fight-or-flight stress response. If the stress is over quickly, your body goes back to normal and no harm is done.  But if stress happens too often or lasts too long, it can have bad effects. Long-term stress can make you more likely to get sick, and it can make symptoms of some diseases worse. If you tense up when you are stressed, you may develop neck, shoulder, or low back pain. Stress is linked to high blood pressure and heart disease.  Stress also harms your emotional health. It can make you calvin, tense, or depressed. Your relationships may suffer, and you may not do well at work or school.  What can you do to manage stress?  You can try these things to help manage stress:   Do something active. Exercise or activity can help reduce stress. Walking is a great way to get started. Even everyday activities such as housecleaning or yard work can help.  Try yoga or robyn chi. These techniques combine exercise and meditation. You may need some training at first to learn them.  Do something you enjoy. For example, listen to music or go to a movie. Practice your hobby or do volunteer  "work.  Meditate. This can help you relax, because you are not worrying about what happened before or what may happen in the future.  Do guided imagery. Imagine yourself in any setting that helps you feel calm. You can use online videos, books, or a teacher to guide you.  Do breathing exercises. For example:  From a standing position, bend forward from the waist with your knees slightly bent. Let your arms dangle close to the floor.  Breathe in slowly and deeply as you return to a standing position. Roll up slowly and lift your head last.  Hold your breath for just a few seconds in the standing position.  Breathe out slowly and bend forward from the waist.  Let your feelings out. Talk, laugh, cry, and express anger when you need to. Talking with supportive friends or family, a counselor, or a ge leader about your feelings is a healthy way to relieve stress. Avoid discussing your feelings with people who make you feel worse.  Write. It may help to write about things that are bothering you. This helps you find out how much stress you feel and what is causing it. When you know this, you can find better ways to cope.  What can you do to prevent stress?  You might try some of these things to help prevent stress:  Manage your time. This helps you find time to do the things you want and need to do.  Get enough sleep. Your body recovers from the stresses of the day while you are sleeping.  Get support. Your family, friends, and community can make a difference in how you experience stress.  Limit your news feed. Avoid or limit time on social media or news that may make you feel stressed.  Do something active. Exercise or activity can help reduce stress. Walking is a great way to get started.  Where can you learn more?  Go to https://www.Think[employer].net/patiented  Enter N032 in the search box to learn more about \"Learning About Stress.\"  Current as of: October 24, 2023  Content Version: 14.3    2024 [email or web address]. "   Care instructions adapted under license by your healthcare professional. If you have questions about a medical condition or this instruction, always ask your healthcare professional. [employer] disclaims any warranty or liability for your use of this information.

## 2025-01-21 NOTE — PROGRESS NOTES
"Preventive Care Visit  [facility] FARHAD Meyers PA-C, Family Medicine  Jan 21, 2025      Assessment & Plan     (Z00.00) Routine general medical examination at a health care facility  (primary encounter diagnosis)  Comment:   Plan:     (E11.9) Type 2 diabetes mellitus without complication, without long-term current use of insulin (H)  Comment: recheck labs. Refilled   Plan: Hemoglobin A1c, Albumin Random Urine         Quantitative with Creat Ratio, OPTOMETRY         REFERRAL, FOOT EXAM, Semaglutide, 2 MG/DOSE, (OZEMPIC) 8 MG/3ML pen            (Z13.6) CARDIOVASCULAR SCREENING; LDL GOAL LESS THAN 160  Comment:   Plan: Basic metabolic panel  (Ca, Cl, CO2, Creat,         Gluc, K, Na, BUN), Lipid panel reflex to direct        LDL Fasting            (I10) Primary hypertension  Comment:   Plan: TSH with free T4 reflex            (E66.01) Morbid obesity (H)  Comment:   Plan: Semaglutide, 2 MG/DOSE, (OZEMPIC) 8 MG/3ML pen            (E55.9) Vitamin D deficiency  Comment:   Plan: Vitamin D Deficiency            (F41.9) Anxiety  Comment:   Plan: hydrOXYzine HCl (ATARAX) 25 MG tablet, propranolol (INDERAL) 20 MG tablet            (Z78.0) Asymptomatic menopausal state  Comment: bilateral oopherectomy in 2008. Was on hormone replacement for the first couple of years but struggled with bleeding so stopped.   Plan: DX Bone Density            (Z11.3) Screen for STD (sexually transmitted disease)  Comment:   Plan: Hepatitis C Screen Reflex to HCV RNA Quant and         Genotype, HIV Antigen Antibody Combo, Treponema        Abs w Reflex to RPR and Titer, Chlamydia         trachomatis PCR - Clinic Collect, Neisseria         gonorrhoeae PCR - Clinic Collect                Patient has been advised of split billing requirements and indicates understanding: Yes        BMI  Estimated body mass index is 42.53 kg/m  as calculated from the following:    Height as of this encounter: 1.753 m (5' 9\").    Weight as of this " encounter: 130.6 kg (288 lb).       Counseling  Appropriate preventive services were addressed with this patient via screening, questionnaire, or discussion as appropriate for fall prevention, nutrition, physical activity, Tobacco-use cessation, social engagement, weight loss and cognition.  Checklist reviewing preventive services available has been given to the patient.  Reviewed patient's diet, addressing concerns and/or questions.   She is at risk for lack of exercise and has been provided with information to increase physical activity for the benefit of her well-being.   The patient was instructed to see the dentist every 6 months.   She is at risk for psychosocial distress and has been provided with information to reduce risk.           Torrie Hernandez is a 44 year old, presenting for the following:  Physical        1/21/2025     1:44 PM   Additional Questions   Roomed by CHANDLER Soto      Health Care Directive  Patient does not have a Health Care Directive: Discussed advance care planning with patient; however, patient declined at this time.      1/16/2025   General Health   How would you rate your overall physical health? Good   Feel stress (tense, anxious, or unable to sleep) To some extent   (!) STRESS CONCERN      1/16/2025   Nutrition   Three or more servings of calcium each day? (!) NO   Diet: Breakfast skipped   How many servings of fruit and vegetables per day? (!) I DON'T KNOW   How many sweetened beverages each day? 0-1         1/16/2025   Exercise   Days per week of moderate/strenous exercise 3 days   Average minutes spent exercising at this level 30 min         1/16/2025   Social Factors   Frequency of gathering with friends or relatives Twice a week   Worry food won't last until get money to buy more Patient declined   Food not last or not have enough money for food? Yes   Do you have housing? (Housing is defined as stable permanent housing and does not include staying ouside in a  car, in a tent, in an abandoned building, in an overnight shelter, or couch-surfing.) Patient declined   Are you worried about losing your housing? Patient declined   Lack of transportation? Patient declined   Unable to get utilities (heat,electricity)? Patient declined   (!) FOOD SECURITY CONCERN PRESENT      2025   Dental   Dentist two times every year? (!) NO         2025   TB Screening   Were you born outside of the [place]? No             2025   Substance Use   Alcohol more than 3/day or more than 7/wk Not Applicable   Do you use any other substances recreationally? No     Social History     Tobacco Use    Smoking status: Former     Current packs/day: 0.00     Average packs/day: 0.5 packs/day for 2.0 years (1.0 ttl pk-yrs)     Types: Cigarettes     Start date: 2020     Quit date: 2022     Years since quittin.1    Smokeless tobacco: Never   Substance Use Topics    Alcohol use: No    Drug use: No           2025   LAST FHS-7 RESULTS   1st degree relative breast or ovarian cancer Yes   Any relative bilateral breast cancer No   Any male have breast cancer No   Any ONE woman have BOTH breast AND ovarian cancer No   Any woman with breast cancer before 50yrs No   2 or more relatives with breast AND/OR ovarian cancer Yes   2 or more relatives with breast AND/OR bowel cancer No       Mammogram Screening - Mammogram every 1-2 years updated in Health Maintenance based on mutual decision making        2025   STI Screening   New sexual partner(s) since last STI/HIV test? (!) DECLINE     History of abnormal Pap smear: YES - reflected in Problem List and Health Maintenance accordingly        Latest Ref Rng & Units 2024     1:38 PM 2023     8:58 AM 2018    12:00 AM   PAP / HPV   PAP  Negative for Intraepithelial Lesion or Malignancy (NILM)  Negative for Intraepithelial Lesion or Malignancy (NILM)     HPV 16 DNA Negative Negative  Negative     HPV 18 DNA Negative Negative   "Negative     Other HR HPV Negative Positive  Positive     PAP-ABSTRACT    See Scanned Document           This result is from an external source.     ASCVD Risk   The 10-year ASCVD risk score (Ree MORENO, et al., 2019) is: 2.6%    Values used to calculate the score:      Age: 44 years      Sex: Female      Is Non- : No      Diabetic: Yes      Tobacco smoker: No      Systolic Blood Pressure: 128 mmHg      Is BP treated: Yes      HDL Cholesterol: 46 mg/dL      Total Cholesterol: 210 mg/dL        1/16/2025   Contraception/Family Planning   Questions about contraception or family planning No       Reviewed and updated as needed this visit by Provider     Meds    Surg Hx             BP Readings from Last 3 Encounters:   01/21/25 128/76   01/08/24 138/86   01/26/23 138/84    Wt Readings from Last 3 Encounters:   01/21/25 130.6 kg (288 lb)   01/08/24 128.4 kg (283 lb)   01/26/23 135.2 kg (298 lb)                      Review of Systems  Constitutional, HEENT, cardiovascular, pulmonary, GI, , musculoskeletal, neuro, skin, endocrine and psych systems are negative, except as otherwise noted.     Objective    Exam  /76   Pulse 100   Temp 97.1  F (36.2  C) (Tympanic)   Ht 1.753 m (5' 9\")   Wt 130.6 kg (288 lb)   SpO2 97%   BMI 42.53 kg/m     Estimated body mass index is 42.53 kg/m  as calculated from the following:    Height as of this encounter: 1.753 m (5' 9\").    Weight as of this encounter: 130.6 kg (288 lb).    Physical Exam  GENERAL: alert and no distress  EYES: Eyes grossly normal to inspection, PERRL and conjunctivae and sclerae normal  HENT: ear canals and TM's normal, nose and mouth without ulcers or lesions  NECK: no adenopathy, no asymmetry, masses, or scars  RESP: lungs clear to auscultation - no rales, rhonchi or wheezes  CV: regular rate and rhythm, normal S1 S2, no S3 or S4, no murmur, click or rub, no peripheral edema  ABDOMEN: soft, nontender, no " hepatosplenomegaly, no masses and bowel sounds normal  MS: no gross musculoskeletal defects noted, no edema  SKIN: no suspicious lesions or rashes  NEURO: Normal strength and tone, mentation intact and speech normal  PSYCH: mentation appears normal, affect normal/bright        Signed Electronically by: Riri Meyers PA-C

## 2025-01-21 NOTE — Clinical Note
Please abstract the following data from this visit with this patient into the appropriate field in Epic:  Tests that can be patient reported without a hard copy:  Pap smear done on this date: 5/7/2024 (approximately), by this group: [facility], results were negative.

## 2025-01-22 ENCOUNTER — MYC MEDICAL ADVICE (OUTPATIENT)
Dept: FAMILY MEDICINE | Facility: CLINIC | Age: 45
End: 2025-01-22
Payer: COMMERCIAL

## 2025-01-22 ENCOUNTER — PATIENT OUTREACH (OUTPATIENT)
Dept: CARE COORDINATION | Facility: CLINIC | Age: 45
End: 2025-01-22
Payer: COMMERCIAL

## 2025-01-22 DIAGNOSIS — E55.9 VITAMIN D DEFICIENCY: Primary | ICD-10-CM

## 2025-01-22 LAB
C TRACH DNA SPEC QL NAA+PROBE: NEGATIVE
N GONORRHOEA DNA SPEC QL NAA+PROBE: NEGATIVE
SPECIMEN TYPE: NORMAL
SPECIMEN TYPE: NORMAL
T PALLIDUM AB SER QL: NONREACTIVE
VIT D+METAB SERPL-MCNC: 16 NG/ML (ref 20–50)

## 2025-01-23 RX ORDER — ERGOCALCIFEROL 1.25 MG/1
50000 CAPSULE, LIQUID FILLED ORAL WEEKLY
Qty: 12 CAPSULE | Refills: 0 | Status: SHIPPED | OUTPATIENT
Start: 2025-01-23

## 2025-02-13 NOTE — LETTER
July 27, 2018      To Whom It May Concern:      Mary Dixon was seen in our Emergency Department today, 07/27/18.  I expect her condition to improve over the next 3 days.  She may return to work/school when improved.    Sincerely,        Steve Diaz MD         Male

## 2025-02-21 ENCOUNTER — MYC MEDICAL ADVICE (OUTPATIENT)
Dept: FAMILY MEDICINE | Facility: CLINIC | Age: 45
End: 2025-02-21
Payer: COMMERCIAL

## 2025-02-25 ENCOUNTER — ALLIED HEALTH/NURSE VISIT (OUTPATIENT)
Dept: FAMILY MEDICINE | Facility: CLINIC | Age: 45
End: 2025-02-25
Payer: COMMERCIAL

## 2025-02-25 DIAGNOSIS — Z23 ENCOUNTER FOR IMMUNIZATION: Primary | ICD-10-CM

## 2025-02-25 PROCEDURE — 99207 PR NO CHARGE NURSE ONLY: CPT

## 2025-02-25 PROCEDURE — 86580 TB INTRADERMAL TEST: CPT

## 2025-02-25 NOTE — PROGRESS NOTES
Patient is here today for a Mantoux (TST) test placement.    Is there a current order in the chart? Yes    Reason for Mantoux (TST) in patient's own words: work    Patient needs form signed? Yes- completed per clinic's forms process.    Instructed patient to wait for 15 minutes post injection and to report any reactions immediately to staff.    Told patient to return to clinic in 48-72 hours to have Mantoux (TST) read.       Janet Miller [facility]

## 2025-02-25 NOTE — PROGRESS NOTES
"Patient is here today for a Mantoux (TST) test placement.    Is there a current order in the chart? No. Placed order according to standing order (reference the \"Skin Test- Tuberculosis Screening- Ambulatory Care\" standing order in Policy Tech). Review the Inclusion and Exclusion Criteria.        Inclusion Criteria  Pre-employment screening for healthcare workers and correctional facility staff - Administer two-step TST. Patient to return for second test in 1-3 weeks after first test is read.     Exclusion Criteria  None - Place order for Mantoux (TST) test per standing order.    Reason for Mantoux (TST) in patient's own words: Work    Patient needs form signed? Yes- completed per clinic's forms process.    Instructed patient to wait for 15 minutes post injection and to report any reactions immediately to staff.    Told patient to return to clinic in 48-72 hours to have Mantoux (TST) read.     Janet Miller [facility]       "

## 2025-02-25 NOTE — PROGRESS NOTES
"Patient is here today for a Mantoux (TST) test placement.    Is there a current order in the chart? No. Placed order according to standing order (reference the \"Skin Test- Tuberculosis Screening- Ambulatory Care\" standing order in Policy Tech). Review the Inclusion and Exclusion Criteria.    {To place the order for Mantoux (TST) test per the standing order, the patient must meet the following (ok to delete):  Be an ESTABLISHED patient AND   Must meet one of the following Inclusion Criteria AND   None of the Exclusion Criteria}    Inclusion Criteria  Pre-employment screening for healthcare workers and correctional facility staff - Administer two-step TST. Patient to return for second test in 1-3 weeks after first test is read.     Exclusion Criteria  None - Place order for Mantoux (TST) test per standing order.    Reason for Mantoux (TST) in patient's own words: Work    Patient needs form signed? Yes- completed per clinic's forms process.    Instructed patient to wait for 15 minutes post injection and to report any reactions immediately to staff.    Told patient to return to clinic in 48-72 hours to have Mantoux (TST) read.     Janet Miller [facility]       "

## 2025-02-25 NOTE — NURSING NOTE
"Patient is here today for a Mantoux (TST) test placement.    Is there a current order in the chart? No. Placed order according to standing order (reference the \"Skin Test- Tuberculosis Screening- Ambulatory Care\" standing order in Policy Tech). Review the Inclusion and Exclusion Criteria.    {To place the order for Mantoux (TST) test per the standing order, the patient must meet the following (ok to delete):  Be an ESTABLISHED patient AND   Must meet one of the following Inclusion Criteria AND   None of the Exclusion Criteria}    Inclusion Criteria  Pre-employment screening for healthcare workers and correctional facility staff - Administer two-step TST. Patient to return for second test in 1-3 weeks after first test is read.     Exclusion Criteria  None - Place order for Mantoux (TST) test per standing order.    Reason for Mantoux (TST) in patient's own words: work    Patient needs form signed? Yes- completed per clinic's forms process.    Instructed patient to wait for 15 minutes post injection and to report any reactions immediately to staff.    Told patient to return to clinic in 48-72 hours to have Mantoux (TST) read.        Janet Miller [facility]    "

## 2025-02-27 ENCOUNTER — ALLIED HEALTH/NURSE VISIT (OUTPATIENT)
Dept: FAMILY MEDICINE | Facility: CLINIC | Age: 45
End: 2025-02-27
Payer: COMMERCIAL

## 2025-02-27 DIAGNOSIS — Z11.1 SCREENING EXAMINATION FOR PULMONARY TUBERCULOSIS: Primary | ICD-10-CM

## 2025-02-27 LAB
PPDINDURATION: 0 MM (ref 0–4.99)
PPDREDNESS: NORMAL

## 2025-02-27 NOTE — PROGRESS NOTES
Patient is here today for a Mantoux (TST) test results.    Did patient return to clinic 48-72 hours from Mantoux (TST) placement: Yes -     PPD Induration   Date Value Ref Range Status   02/27/2025 0 0 - 4.99 mm Final     PPD Redness   Date Value Ref Range Status   02/27/2025 Not Present  Final       Induration Size? Induration <5mm - Enter results in Enter/Edit Activity. Route results to ordering provider.     Patient needs form signed? No    Patient reports having previously had the BCG Vaccine: No    Does patient need a two step? No    Amy Crowell RN

## 2025-04-14 DIAGNOSIS — E55.9 VITAMIN D DEFICIENCY: ICD-10-CM

## 2025-04-14 RX ORDER — ERGOCALCIFEROL 1.25 MG/1
50000 CAPSULE, LIQUID FILLED ORAL WEEKLY
Qty: 12 CAPSULE | Refills: 0 | OUTPATIENT
Start: 2025-04-14

## 2025-04-18 DIAGNOSIS — E55.9 VITAMIN D DEFICIENCY: ICD-10-CM

## 2025-04-22 RX ORDER — ERGOCALCIFEROL 1.25 MG/1
50000 CAPSULE, LIQUID FILLED ORAL WEEKLY
Qty: 12 CAPSULE | Refills: 0 | Status: SHIPPED | OUTPATIENT
Start: 2025-04-22

## 2025-07-08 ENCOUNTER — OFFICE VISIT (OUTPATIENT)
Dept: FAMILY MEDICINE | Facility: CLINIC | Age: 45
End: 2025-07-08
Payer: MEDICAID

## 2025-07-08 VITALS
SYSTOLIC BLOOD PRESSURE: 132 MMHG | WEIGHT: 293 LBS | BODY MASS INDEX: 43.4 KG/M2 | DIASTOLIC BLOOD PRESSURE: 84 MMHG | HEART RATE: 74 BPM | RESPIRATION RATE: 16 BRPM | TEMPERATURE: 97 F | HEIGHT: 69 IN | OXYGEN SATURATION: 98 %

## 2025-07-08 DIAGNOSIS — H43.393 VITREOUS FLOATERS OF BOTH EYES: ICD-10-CM

## 2025-07-08 DIAGNOSIS — M25.50 MULTIPLE JOINT PAIN: Primary | ICD-10-CM

## 2025-07-08 DIAGNOSIS — E11.65 TYPE 2 DIABETES MELLITUS WITH HYPERGLYCEMIA, WITHOUT LONG-TERM CURRENT USE OF INSULIN (H): ICD-10-CM

## 2025-07-08 LAB
ALBUMIN SERPL BCG-MCNC: 4.3 G/DL (ref 3.5–5.2)
ALP SERPL-CCNC: 134 U/L (ref 40–150)
ALT SERPL W P-5'-P-CCNC: 26 U/L (ref 0–50)
ANION GAP SERPL CALCULATED.3IONS-SCNC: 15 MMOL/L (ref 7–15)
AST SERPL W P-5'-P-CCNC: 24 U/L (ref 0–45)
BASOPHILS # BLD AUTO: 0 10E3/UL (ref 0–0.2)
BASOPHILS NFR BLD AUTO: 1 %
BILIRUB SERPL-MCNC: 0.3 MG/DL
BUN SERPL-MCNC: 13.6 MG/DL (ref 6–20)
CALCIUM SERPL-MCNC: 9.2 MG/DL (ref 8.8–10.4)
CHLORIDE SERPL-SCNC: 104 MMOL/L (ref 98–107)
CREAT SERPL-MCNC: 0.81 MG/DL (ref 0.51–0.95)
CRP SERPL-MCNC: <3 MG/L
EGFRCR SERPLBLD CKD-EPI 2021: >90 ML/MIN/1.73M2
EOSINOPHIL # BLD AUTO: 0.1 10E3/UL (ref 0–0.7)
EOSINOPHIL NFR BLD AUTO: 2 %
ERYTHROCYTE [DISTWIDTH] IN BLOOD BY AUTOMATED COUNT: 12.7 % (ref 10–15)
EST. AVERAGE GLUCOSE BLD GHB EST-MCNC: 120 MG/DL
FERRITIN SERPL-MCNC: 229 NG/ML (ref 6–175)
GLUCOSE SERPL-MCNC: 128 MG/DL (ref 70–99)
HBA1C MFR BLD: 5.8 % (ref 0–5.6)
HCO3 SERPL-SCNC: 22 MMOL/L (ref 22–29)
HCT VFR BLD AUTO: 44 % (ref 35–47)
HCV AB SERPL QL IA: NONREACTIVE
HGB BLD-MCNC: 14.8 G/DL (ref 11.7–15.7)
HIV 1+2 AB+HIV1 P24 AG SERPL QL IA: NONREACTIVE
IMM GRANULOCYTES # BLD: 0 10E3/UL
IMM GRANULOCYTES NFR BLD: 0 %
LYMPHOCYTES # BLD AUTO: 2.2 10E3/UL (ref 0.8–5.3)
LYMPHOCYTES NFR BLD AUTO: 27 %
MCH RBC QN AUTO: 29.6 PG (ref 26.5–33)
MCHC RBC AUTO-ENTMCNC: 33.6 G/DL (ref 31.5–36.5)
MCV RBC AUTO: 88 FL (ref 78–100)
MONOCYTES # BLD AUTO: 0.5 10E3/UL (ref 0–1.3)
MONOCYTES NFR BLD AUTO: 7 %
NEUTROPHILS # BLD AUTO: 5.2 10E3/UL (ref 1.6–8.3)
NEUTROPHILS NFR BLD AUTO: 64 %
PLATELET # BLD AUTO: 227 10E3/UL (ref 150–450)
POTASSIUM SERPL-SCNC: 4.3 MMOL/L (ref 3.4–5.3)
PROT SERPL-MCNC: 7.7 G/DL (ref 6.4–8.3)
RBC # BLD AUTO: 5 10E6/UL (ref 3.8–5.2)
RHEUMATOID FACT SERPL-ACNC: <10 IU/ML
SODIUM SERPL-SCNC: 141 MMOL/L (ref 135–145)
VIT D+METAB SERPL-MCNC: 35 NG/ML (ref 20–50)
WBC # BLD AUTO: 8.1 10E3/UL (ref 4–11)

## 2025-07-08 PROCEDURE — 87389 HIV-1 AG W/HIV-1&-2 AB AG IA: CPT | Performed by: PHYSICIAN ASSISTANT

## 2025-07-08 PROCEDURE — 87468 ANAPLSMA PHGCYTOPHLM AMP PRB: CPT | Performed by: PHYSICIAN ASSISTANT

## 2025-07-08 PROCEDURE — 85025 COMPLETE CBC W/AUTO DIFF WBC: CPT | Performed by: PHYSICIAN ASSISTANT

## 2025-07-08 PROCEDURE — 3044F HG A1C LEVEL LT 7.0%: CPT | Performed by: PHYSICIAN ASSISTANT

## 2025-07-08 PROCEDURE — 86803 HEPATITIS C AB TEST: CPT | Performed by: PHYSICIAN ASSISTANT

## 2025-07-08 PROCEDURE — 87798 DETECT AGENT NOS DNA AMP: CPT | Performed by: PHYSICIAN ASSISTANT

## 2025-07-08 PROCEDURE — 80053 COMPREHEN METABOLIC PANEL: CPT | Performed by: PHYSICIAN ASSISTANT

## 2025-07-08 PROCEDURE — 86140 C-REACTIVE PROTEIN: CPT | Performed by: PHYSICIAN ASSISTANT

## 2025-07-08 PROCEDURE — 36415 COLL VENOUS BLD VENIPUNCTURE: CPT | Performed by: PHYSICIAN ASSISTANT

## 2025-07-08 PROCEDURE — 82306 VITAMIN D 25 HYDROXY: CPT | Performed by: PHYSICIAN ASSISTANT

## 2025-07-08 PROCEDURE — 3079F DIAST BP 80-89 MM HG: CPT | Performed by: PHYSICIAN ASSISTANT

## 2025-07-08 PROCEDURE — 83036 HEMOGLOBIN GLYCOSYLATED A1C: CPT | Performed by: PHYSICIAN ASSISTANT

## 2025-07-08 PROCEDURE — 82728 ASSAY OF FERRITIN: CPT | Performed by: PHYSICIAN ASSISTANT

## 2025-07-08 PROCEDURE — 86431 RHEUMATOID FACTOR QUANT: CPT | Performed by: PHYSICIAN ASSISTANT

## 2025-07-08 PROCEDURE — 3075F SYST BP GE 130 - 139MM HG: CPT | Performed by: PHYSICIAN ASSISTANT

## 2025-07-08 PROCEDURE — 86200 CCP ANTIBODY: CPT | Performed by: PHYSICIAN ASSISTANT

## 2025-07-08 PROCEDURE — 86618 LYME DISEASE ANTIBODY: CPT | Performed by: PHYSICIAN ASSISTANT

## 2025-07-08 PROCEDURE — 99214 OFFICE O/P EST MOD 30 MIN: CPT | Performed by: PHYSICIAN ASSISTANT

## 2025-07-08 RX ORDER — LANCETS
EACH MISCELLANEOUS
Qty: 100 EACH | Refills: 6 | Status: SHIPPED | OUTPATIENT
Start: 2025-07-08

## 2025-07-08 ASSESSMENT — ENCOUNTER SYMPTOMS: HEADACHES: 1

## 2025-07-08 NOTE — PROGRESS NOTES
"  Assessment & Plan       ICD-10-CM    1. Multiple joint pain  M25.50 LYME DISEASE TOTAL ANTIBODIES WITH REFLEX TO CONFIRMATION     Tick-Borne Disease Panel (Non-Lyme) by PCR     Comprehensive metabolic panel (BMP + Alb, Alk Phos, ALT, AST, Total. Bili, TP)     CBC with platelets and differential     Vitamin D Deficiency     Ferritin     Anti Nuclear Evi IgG by IFA with Reflex     Rheumatoid factor     Cyclic Citrullinated Peptide Antibody IgG     CRP, inflammation     LYME DISEASE TOTAL ANTIBODIES WITH REFLEX TO CONFIRMATION     Tick-Borne Disease Panel (Non-Lyme) by PCR     Comprehensive metabolic panel (BMP + Alb, Alk Phos, ALT, AST, Total. Bili, TP)     CBC with platelets and differential     Vitamin D Deficiency     Ferritin     Anti Nuclear Evi IgG by IFA with Reflex     Rheumatoid factor     Cyclic Citrullinated Peptide Antibody IgG     CRP, inflammation      2. Vitreous floaters of both eyes  H43.393       3. Type 2 diabetes mellitus with hyperglycemia, without long-term current use of insulin (H)  E11.65 HEMOGLOBIN A1C     blood glucose monitoring (NO BRAND SPECIFIED) meter device kit     blood glucose (NO BRAND SPECIFIED) test strip     thin (NO BRAND SPECIFIED) lancets     HEMOGLOBIN A1C        Fairly abrupt change in symptoms that is bothering/stressful to patient  Check labs today and will include tick panel   No major change to medications other than the semaglutide dose increase but she has been going slowly and not had issues up to this point although still would not entirely exclude given recent does change  Has appt with ophthalmology next week to address floaters    BMI  Estimated body mass index is 45.63 kg/m  as calculated from the following:    Height as of this encounter: 1.753 m (5' 9\").    Weight as of this encounter: 140.2 kg (309 lb).     Torrie Hernandez is a 45 year old, presenting for the following health issues:  Headache        7/8/2025     7:46 AM   Additional Questions   Roomed " "by CHANDLER Soto     Headache     History of Present Illness       Headaches:   Since the patient's last clinic visit, headaches are: no change  The patient is getting headaches:  Intermittent  She is able to do normal daily activities when she has a migraine.  The patient is taking the following rescue/relief medications:  Ibuprofen (Advil, Motrin) and Tylenol   Patient states \"The relief is inconsistent\" from the rescue/relief medications.   The patient is taking the following medications to prevent migraines:  No medications to prevent migraines  In the past 4 weeks, the patient has gone to an Urgent Care or Emergency Room 0 times times due to headaches.    Reason for visit:  Issues with flashes and floters in eyes.  Symptom onset:  3-4 weeks ago  Symptoms include:  Flahses, worm like floters in vision  Symptom intensity:  Mild  Symptom progression:  Staying the same  Had these symptoms before:  No  What makes it worse:  I dont know they are random  What makes it better:  No         -She has been having joint pain. Mainly in her limbs.       Tearful, stressed  Has not felt well for the last 2 weeks. States it was a fairly acute change in symptoms  Woke up one day and all of her joints \"ached\"   Feels nauseated although not really nausea as much of just feels \"not right\"   Has noticed more floaters in her eyes - floaters look like worms in her peripheral vision. She does have an eye appt next week  Weight gain - on semaglutide but feels like her weight is up and she is 'puffy' everywhere     Lost her job ~2 weeks ago but states that has actually been a stress relief so doesn't feel it is depression or mood changes causing symptoms  Increased her dose of semaglutide but increase doesn't coincide to these symptoms   There is a family history of RA in her sister  I asked about tick bites and she does recall having a tick attached on her abdomen that she pulled out but the area was red and swollen for awhile to the point " "she started to wonder if the head was stuck in there. Better now. States it was a 'larger' tick          Review of Systems  Constitutional, neuro, ENT, endocrine, pulmonary, cardiac, gastrointestinal, genitourinary, musculoskeletal, integument and psychiatric systems are negative, except as otherwise noted.      Objective    /84   Pulse 74   Temp 97  F (36.1  C) (Tympanic)   Resp 16   Ht 1.753 m (5' 9\")   Wt (!) 140.2 kg (309 lb)   SpO2 98%   BMI 45.63 kg/m    Body mass index is 45.63 kg/m .  Physical Exam   GENERAL: alert and no distress  MS: no obvious swelling, redness over joints  PSYCH: mentation normal, tearful     Diagnostic Tests: pending        Signed Electronically by: Riri Meyers PA-C    "

## 2025-07-09 LAB
A PHAGOCYTOPH DNA BLD QL NAA+PROBE: NOT DETECTED
B BURGDOR IGG+IGM SER QL: 0.29
BABESIA DNA BLD QL NAA+PROBE: NOT DETECTED
CCP AB SER IA-ACNC: 1 U/ML
EHRLICHIA DNA SPEC QL NAA+PROBE: NOT DETECTED

## 2025-07-10 ENCOUNTER — MYC MEDICAL ADVICE (OUTPATIENT)
Dept: FAMILY MEDICINE | Facility: CLINIC | Age: 45
End: 2025-07-10
Payer: MEDICAID

## 2025-07-10 DIAGNOSIS — R53.83 OTHER FATIGUE: Primary | ICD-10-CM

## 2025-07-13 LAB — ANA SER QL IF: NEGATIVE

## 2025-07-14 ENCOUNTER — OFFICE VISIT (OUTPATIENT)
Dept: OPTOMETRY | Facility: CLINIC | Age: 45
End: 2025-07-14
Payer: MEDICAID

## 2025-07-14 DIAGNOSIS — H52.13 MYOPIA OF BOTH EYES: ICD-10-CM

## 2025-07-14 DIAGNOSIS — H53.19 PHOTOPSIA: Primary | ICD-10-CM

## 2025-07-14 PROCEDURE — 99213 OFFICE O/P EST LOW 20 MIN: CPT | Performed by: OPTOMETRIST

## 2025-07-14 ASSESSMENT — CONF VISUAL FIELD
OD_SUPERIOR_TEMPORAL_RESTRICTION: 0
OD_SUPERIOR_NASAL_RESTRICTION: 0
OS_NORMAL: 1
OS_INFERIOR_NASAL_RESTRICTION: 0
OD_INFERIOR_NASAL_RESTRICTION: 0
OS_SUPERIOR_TEMPORAL_RESTRICTION: 0
OD_NORMAL: 1
OD_INFERIOR_TEMPORAL_RESTRICTION: 0
OS_SUPERIOR_NASAL_RESTRICTION: 0
OS_INFERIOR_TEMPORAL_RESTRICTION: 0

## 2025-07-14 ASSESSMENT — SLIT LAMP EXAM - LIDS
COMMENTS: NORMAL
COMMENTS: NORMAL

## 2025-07-14 ASSESSMENT — REFRACTION_MANIFEST
OS_CYLINDER: +0.50
OS_ADD: +1.25
OD_SPHERE: -1.25
OS_AXIS: 080
OS_SPHERE: -1.00
OD_ADD: +1.25
OD_CYLINDER: SPHERE

## 2025-07-14 ASSESSMENT — TONOMETRY
IOP_METHOD: APPLANATION
OS_IOP_MMHG: 18
OD_IOP_MMHG: 19

## 2025-07-14 ASSESSMENT — CUP TO DISC RATIO
OD_RATIO: 0.1
OS_RATIO: 0.1

## 2025-07-14 ASSESSMENT — EXTERNAL EXAM - RIGHT EYE: OD_EXAM: NORMAL

## 2025-07-14 ASSESSMENT — VISUAL ACUITY
OD_SC+: -2
OD_SC: 20/30
OS_SC: 20/20
METHOD: SNELLEN - LINEAR

## 2025-07-14 ASSESSMENT — EXTERNAL EXAM - LEFT EYE: OS_EXAM: NORMAL

## 2025-07-14 NOTE — PROGRESS NOTES
Chief Complaint   Patient presents with    Floaters Follow-Up    Flashes Evaluation       Do you wear contact lenses? No     Patient is complaining of flashing lights, and increase in floaters, no curtains, no loss of vision. Unable to discern if flashing is one eye or both since it's occurring so infrequently.  No pain.  She has a history of pseudo tumor celebri, papilledema.  OK with dilation today.    Would like a new glasses prescription today, if possible. Currently uses SV glasses mostly for driving at night.    Medical, surgical and family histories reviewed and updated 7/14/2025.         OBJECTIVE: See Ophthalmology exam    ASSESSMENT:    ICD-10-CM    1. Photopsia  H53.19       2. Myopia of both eyes  H52.13          PLAN:    Patient Instructions   Floaters are small specks or clouds that move in your vision. They may appear to dart away when you try to look directly at them. They are most noticeable in bright light when looking at a blank wall, a solid colored surface, or the cele.    These happen with age as the vitreous gel ( the fluid that fills the eyeball), starts to become more liquified and shrinks to form clumps or strands. The gel then pulls away from the back of the retina leaving a clump of tissue where the vitreous was previously attached, causing a posterior vitreous detachment.   This is more common with people that are nearsighted or have undergone certain ocular surgeries, inflammatory conditions or experienced trauma.  You should always be checked by an eye doctor right away if you have a sudden change in floaters, flashes, or change/ loss in peripheral vision. This could indicate a retinal tear, hole or detachment that could lead to permanent vision loss if not treated.    Updated glasses prescription provided today.      The effects of the dilating drops last for 4- 6 hours.  You will be more sensitive to light and vision will be blurry up close.  Mydriatic sunglasses were given if needed.      Marvin Cuevas, OD  [address] [place]  [address]    [phone]

## 2025-07-14 NOTE — PATIENT INSTRUCTIONS
Floaters are small specks or clouds that move in your vision. They may appear to dart away when you try to look directly at them. They are most noticeable in bright light when looking at a blank wall, a solid colored surface, or the cele.    These happen with age as the vitreous gel ( the fluid that fills the eyeball), starts to become more liquified and shrinks to form clumps or strands. The gel then pulls away from the back of the retina leaving a clump of tissue where the vitreous was previously attached, causing a posterior vitreous detachment.   This is more common with people that are nearsighted or have undergone certain ocular surgeries, inflammatory conditions or experienced trauma.  You should always be checked by an eye doctor right away if you have a sudden change in floaters, flashes, or change/ loss in peripheral vision. This could indicate a retinal tear, hole or detachment that could lead to permanent vision loss if not treated.    Updated glasses prescription provided today.      The effects of the dilating drops last for 4- 6 hours.  You will be more sensitive to light and vision will be blurry up close.  Mydriatic sunglasses were given if needed.     Marvin Cuevas, OD  [address] [place]  [address] [phone]

## 2025-07-14 NOTE — TELEPHONE ENCOUNTER
"Patient calling back about labs done 7/8/25. She reports she continues to have joint pain all over. She states she has gained 5 lb since that OV. Denies new edema or SOB. She is unavailable by phone today but can read [employer].     She did see eye doctor per your recommendation and he said she is \"fine.\"     Pharmacy: [facility] in [place]    Please advise next steps from labs.  Becca Conley RN on 7/14/2025 at 2:30 PM    "

## 2025-07-14 NOTE — LETTER
7/14/2025      Mary Dixon  [address]   [place] [address]      Dear Colleague,    Thank you for referring your patient, Mary Dixon, to the [facility]. Please see a copy of my visit note below.    Chief Complaint   Patient presents with     Floaters Follow-Up     Flashes Evaluation       Do you wear contact lenses? No     Patient is complaining of flashing lights, and increase in floaters, no curtains, no loss of vision. Unable to discern if flashing is one eye or both since it's occurring so infrequently.  No pain.  She has a history of pseudo tumor celebri, papilledema.  OK with dilation today.    Would like a new glasses prescription today, if possible. Currently uses SV glasses mostly for driving at night.    Medical, surgical and family histories reviewed and updated 7/14/2025.         OBJECTIVE: See Ophthalmology exam    ASSESSMENT:    ICD-10-CM    1. Photopsia  H53.19       2. Myopia of both eyes  H52.13          PLAN:    Patient Instructions   Floaters are small specks or clouds that move in your vision. They may appear to dart away when you try to look directly at them. They are most noticeable in bright light when looking at a blank wall, a solid colored surface, or the cele.    These happen with age as the vitreous gel ( the fluid that fills the eyeball), starts to become more liquified and shrinks to form clumps or strands. The gel then pulls away from the back of the retina leaving a clump of tissue where the vitreous was previously attached, causing a posterior vitreous detachment.   This is more common with people that are nearsighted or have undergone certain ocular surgeries, inflammatory conditions or experienced trauma.  You should always be checked by an eye doctor right away if you have a sudden change in floaters, flashes, or change/ loss in peripheral vision. This could indicate a retinal tear, hole or detachment that could lead to permanent vision loss if not  treated.    Updated glasses prescription provided today.      The effects of the dilating drops last for 4- 6 hours.  You will be more sensitive to light and vision will be blurry up close.  Mydriatic sunglasses were given if needed.     Marvin Cuevas OD  [address] [place]  [address]    [phone]      Again, thank you for allowing me to participate in the care of your patient.        Sincerely,        Marvin Cuevas OD    Electronically signed

## 2025-07-21 ENCOUNTER — RESULTS FOLLOW-UP (OUTPATIENT)
Dept: FAMILY MEDICINE | Facility: CLINIC | Age: 45
End: 2025-07-21
Payer: MEDICAID

## 2025-07-21 DIAGNOSIS — E03.9 HYPOTHYROIDISM, UNSPECIFIED TYPE: Primary | ICD-10-CM

## 2025-07-21 RX ORDER — LEVOTHYROXINE SODIUM 50 UG/1
50 TABLET ORAL
Qty: 90 TABLET | Refills: 1 | Status: SHIPPED | OUTPATIENT
Start: 2025-07-21

## 2025-07-30 ENCOUNTER — MYC MEDICAL ADVICE (OUTPATIENT)
Dept: FAMILY MEDICINE | Facility: CLINIC | Age: 45
End: 2025-07-30
Payer: MEDICAID

## 2025-07-30 DIAGNOSIS — M25.50 MULTIPLE JOINT PAIN: Primary | ICD-10-CM

## 2025-07-31 ENCOUNTER — LAB (OUTPATIENT)
Dept: LAB | Facility: CLINIC | Age: 45
End: 2025-07-31
Payer: MEDICAID

## 2025-07-31 DIAGNOSIS — E03.9 HYPOTHYROIDISM, UNSPECIFIED TYPE: ICD-10-CM

## 2025-07-31 DIAGNOSIS — M25.50 MULTIPLE JOINT PAIN: ICD-10-CM

## 2025-08-12 ENCOUNTER — VIRTUAL VISIT (OUTPATIENT)
Dept: FAMILY MEDICINE | Facility: CLINIC | Age: 45
End: 2025-08-12
Payer: MEDICAID

## 2025-08-12 DIAGNOSIS — A69.20 LYME DISEASE: Primary | ICD-10-CM

## 2025-08-12 DIAGNOSIS — M62.838 MUSCLE SPASM: ICD-10-CM

## 2025-08-12 DIAGNOSIS — Z90.722 S/P BSO (BILATERAL SALPINGO-OOPHORECTOMY): ICD-10-CM
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
## 2025-08-12 PROCEDURE — 98014 SYNCH AUDIO-ONLY EST MOD 30: CPT | Performed by: PHYSICIAN ASSISTANT

## 2025-08-12 RX ORDER — AMOXICILLIN 500 MG/1
500 CAPSULE ORAL 3 TIMES DAILY
Qty: 42 CAPSULE | Refills: 0 | Status: SHIPPED | OUTPATIENT
Start: 2025-08-12 | End: 2025-08-26

## 2025-08-12 RX ORDER — CYCLOBENZAPRINE HCL 10 MG
10 TABLET ORAL 3 TIMES DAILY PRN
Qty: 90 TABLET | Refills: 1 | Status: SHIPPED | OUTPATIENT
Start: 2025-08-12

## 2025-08-14 RX ORDER — LOVASTATIN 40 MG/1
1 TABLET ORAL
Qty: 24 PATCH | Refills: 1 | Status: SHIPPED | OUTPATIENT
Start: 2025-08-14

## 2025-08-14 RX ORDER — PROGESTERONE 100 MG/1
100 CAPSULE ORAL DAILY
Qty: 90 CAPSULE | Refills: 1 | Status: SHIPPED | OUTPATIENT
Start: 2025-08-14

## (undated) DEVICE — SYR 10ML LL W/O NDL

## (undated) DEVICE — SOL NACL 0.9% INJ 1000ML BAG 07983-09

## (undated) DEVICE — CLIP APPLIER ENDO 5MM M/L LIGAMAX EL5ML

## (undated) DEVICE — ESU HOLSTER PLASTIC DISP E2400

## (undated) DEVICE — SOL NACL 0.9% IRRIG 1000ML BOTTLE 07138-09

## (undated) DEVICE — DECANTER VIAL 2006S

## (undated) DEVICE — DRAPE POUCH INSTRUMENT 3 POCKET 1018L

## (undated) DEVICE — GLOVE PROTEXIS W/NEU-THERA 8.0  2D73TE80

## (undated) DEVICE — GOWN XLG DISP 9545

## (undated) DEVICE — Device

## (undated) DEVICE — SU VICRYL 0 UR-6 27" J603H

## (undated) DEVICE — DRAPE C-ARM 60X42" 1013

## (undated) DEVICE — ENDO TROCAR FIRST ENTRY KII FIOS ADV FIX 05X100MM CFF03

## (undated) DEVICE — SUCTION IRRIGATION STRYKFLOW II W/TIP DISP 250-070-520

## (undated) DEVICE — ESU PENCIL W/COATED BLADE E2450H

## (undated) DEVICE — PREP CHLORAPREP 26ML TINTED ORANGE  260815

## (undated) DEVICE — SU VICRYL 4-0 FS-2 27" J422-H

## (undated) DEVICE — SOL WATER IRRIG 1000ML BOTTLE 07139-09

## (undated) DEVICE — ENDO TROCAR SLEEVE KII ADV FIXATION 05X100MM CFS02

## (undated) DEVICE — ADHESIVE SWIFTSET 0.8ML OCTYL SS6

## (undated) DEVICE — ENDO POUCH UNIV RETRIEVAL SYSTEM INZII 10MM CD001

## (undated) DEVICE — KIT PROCEDURE PINPOINT PP9036

## (undated) DEVICE — RAD RX CONRAY 60% (50ML) CHARGE PER ML

## (undated) DEVICE — ENDO FLOSEAL APPLICATOR 1500181

## (undated) DEVICE — ENDO TROCAR BLUNT TIP KII BALLOON 12X100MM C0R47

## (undated) DEVICE — ADH FLOSEAL W/HUMAN THROMBIN 5ML

## (undated) DEVICE — ESU ENDO SCISSORS 5MM CVD 5DCS

## (undated) RX ORDER — BUPIVACAINE HYDROCHLORIDE AND EPINEPHRINE 2.5; 5 MG/ML; UG/ML
INJECTION, SOLUTION EPIDURAL; INFILTRATION; INTRACAUDAL; PERINEURAL
Status: DISPENSED
Start: 2018-08-01

## (undated) RX ORDER — HYDROMORPHONE HYDROCHLORIDE 1 MG/ML
INJECTION, SOLUTION INTRAMUSCULAR; INTRAVENOUS; SUBCUTANEOUS
Status: DISPENSED
Start: 2018-08-01

## (undated) RX ORDER — FENTANYL CITRATE 50 UG/ML
INJECTION, SOLUTION INTRAMUSCULAR; INTRAVENOUS
Status: DISPENSED
Start: 2018-08-01

## (undated) RX ORDER — KETOROLAC TROMETHAMINE 30 MG/ML
INJECTION, SOLUTION INTRAMUSCULAR; INTRAVENOUS
Status: DISPENSED
Start: 2018-08-01

## (undated) RX ORDER — DEXAMETHASONE SODIUM PHOSPHATE 4 MG/ML
INJECTION, SOLUTION INTRA-ARTICULAR; INTRALESIONAL; INTRAMUSCULAR; INTRAVENOUS; SOFT TISSUE
Status: DISPENSED
Start: 2018-08-01

## (undated) RX ORDER — GLYCOPYRROLATE 0.2 MG/ML
INJECTION, SOLUTION INTRAMUSCULAR; INTRAVENOUS
Status: DISPENSED
Start: 2018-08-01

## (undated) RX ORDER — ONDANSETRON 2 MG/ML
INJECTION INTRAMUSCULAR; INTRAVENOUS
Status: DISPENSED
Start: 2018-08-01

## (undated) RX ORDER — HYDROCODONE BITARTRATE AND ACETAMINOPHEN 5; 325 MG/1; MG/1
TABLET ORAL
Status: DISPENSED
Start: 2018-08-01

## (undated) RX ORDER — PROPOFOL 10 MG/ML
INJECTION, EMULSION INTRAVENOUS
Status: DISPENSED
Start: 2018-08-01

## (undated) RX ORDER — LIDOCAINE HYDROCHLORIDE 10 MG/ML
INJECTION, SOLUTION EPIDURAL; INFILTRATION; INTRACAUDAL; PERINEURAL
Status: DISPENSED
Start: 2018-08-01